# Patient Record
Sex: MALE | Race: BLACK OR AFRICAN AMERICAN | NOT HISPANIC OR LATINO | Employment: UNEMPLOYED | URBAN - METROPOLITAN AREA
[De-identification: names, ages, dates, MRNs, and addresses within clinical notes are randomized per-mention and may not be internally consistent; named-entity substitution may affect disease eponyms.]

---

## 2017-08-21 ENCOUNTER — TRANSFERRED RECORDS (OUTPATIENT)
Dept: HEALTH INFORMATION MANAGEMENT | Facility: CLINIC | Age: 62
End: 2017-08-21

## 2019-09-18 ENCOUNTER — TRANSFERRED RECORDS (OUTPATIENT)
Dept: HEALTH INFORMATION MANAGEMENT | Facility: CLINIC | Age: 64
End: 2019-09-18

## 2020-01-27 ENCOUNTER — TRANSFERRED RECORDS (OUTPATIENT)
Dept: HEALTH INFORMATION MANAGEMENT | Facility: CLINIC | Age: 65
End: 2020-01-27

## 2020-06-02 ENCOUNTER — TRANSFERRED RECORDS (OUTPATIENT)
Dept: HEALTH INFORMATION MANAGEMENT | Facility: CLINIC | Age: 65
End: 2020-06-02

## 2020-06-21 ENCOUNTER — TRANSFERRED RECORDS (OUTPATIENT)
Dept: HEALTH INFORMATION MANAGEMENT | Facility: CLINIC | Age: 65
End: 2020-06-21

## 2020-07-08 ENCOUNTER — TRANSFERRED RECORDS (OUTPATIENT)
Dept: HEALTH INFORMATION MANAGEMENT | Facility: CLINIC | Age: 65
End: 2020-07-08

## 2020-07-23 ENCOUNTER — TRANSFERRED RECORDS (OUTPATIENT)
Dept: HEALTH INFORMATION MANAGEMENT | Facility: CLINIC | Age: 65
End: 2020-07-23

## 2020-10-09 ENCOUNTER — TRANSFERRED RECORDS (OUTPATIENT)
Dept: HEALTH INFORMATION MANAGEMENT | Facility: CLINIC | Age: 65
End: 2020-10-09

## 2020-12-02 ENCOUNTER — TRANSFERRED RECORDS (OUTPATIENT)
Dept: HEALTH INFORMATION MANAGEMENT | Facility: CLINIC | Age: 65
End: 2020-12-02

## 2020-12-04 ENCOUNTER — MEDICAL CORRESPONDENCE (OUTPATIENT)
Dept: HEALTH INFORMATION MANAGEMENT | Facility: CLINIC | Age: 65
End: 2020-12-04

## 2020-12-04 ENCOUNTER — TRANSCRIBE ORDERS (OUTPATIENT)
Dept: OTHER | Age: 65
End: 2020-12-04

## 2020-12-04 DIAGNOSIS — I82.409 DVT (DEEP VENOUS THROMBOSIS) (H): ICD-10-CM

## 2020-12-04 DIAGNOSIS — I26.99 PULMONARY EMBOLISM (H): ICD-10-CM

## 2020-12-04 DIAGNOSIS — D64.9 ANEMIA: Primary | ICD-10-CM

## 2020-12-10 ENCOUNTER — TRANSFERRED RECORDS (OUTPATIENT)
Dept: HEALTH INFORMATION MANAGEMENT | Facility: CLINIC | Age: 65
End: 2020-12-10

## 2020-12-10 NOTE — TELEPHONE ENCOUNTER
Oncology/Surgical Oncology Referral Request:     Specialty Requested: Medical Oncology     Referring Provider: Dr Adam Sargent    Referring Clinic/Organization: [facility]     Records location: Faxed - Media tab/Scanned     Requested Provider (if specified): Not Specified

## 2020-12-15 ENCOUNTER — OFFICE VISIT (OUTPATIENT)
Dept: FAMILY MEDICINE | Facility: CLINIC | Age: 65
End: 2020-12-15
Payer: MEDICARE

## 2020-12-15 VITALS
SYSTOLIC BLOOD PRESSURE: 100 MMHG | DIASTOLIC BLOOD PRESSURE: 58 MMHG | HEART RATE: 90 BPM | OXYGEN SATURATION: 97 % | WEIGHT: 199 LBS | TEMPERATURE: 98.2 F

## 2020-12-15 DIAGNOSIS — M25.562 CHRONIC PAIN OF LEFT KNEE: ICD-10-CM

## 2020-12-15 DIAGNOSIS — M21.962 DEFORMITY OF LEFT KNEE JOINT: ICD-10-CM

## 2020-12-15 DIAGNOSIS — M75.01 ADHESIVE CAPSULITIS OF RIGHT SHOULDER: ICD-10-CM

## 2020-12-15 DIAGNOSIS — G89.29 CHRONIC PAIN OF LEFT KNEE: ICD-10-CM

## 2020-12-15 DIAGNOSIS — M19.111 OSTEOARTHRITIS OF RIGHT SHOULDER DUE TO ROTATOR CUFF INJURY: ICD-10-CM

## 2020-12-15 DIAGNOSIS — S46.001S OSTEOARTHRITIS OF RIGHT SHOULDER DUE TO ROTATOR CUFF INJURY: ICD-10-CM

## 2020-12-15 DIAGNOSIS — Z96.652 STATUS POST LEFT KNEE REPLACEMENT: Primary | ICD-10-CM

## 2020-12-15 DIAGNOSIS — M62.559 ATROPHY OF QUADRICEPS FEMORIS MUSCLE: ICD-10-CM

## 2020-12-15 PROCEDURE — 99203 OFFICE O/P NEW LOW 30 MIN: CPT | Performed by: PHYSICIAN ASSISTANT

## 2020-12-15 SDOH — HEALTH STABILITY: MENTAL HEALTH: HOW OFTEN DO YOU HAVE A DRINK CONTAINING ALCOHOL?: NEVER

## 2020-12-15 NOTE — PROGRESS NOTES
Subjective     Casey Martinez is a 65 year old male who presents to clinic today for the following health issues:    HPI         Musculoskeletal problem/pain  Onset/Duration: had knee replacement surgery 10 years ago:   Description  Location: knee and both shoulders - left  Joint Swelling: YES- left knee  Redness: YES  Pain: YES  Warmth: YES  Intensity:  severe  Progression of Symptoms:  worsening and intermittent  Accompanying signs and symptoms:   Fevers: no  Numbness/tingling/weakness: YES  History  Trauma to the area: Had surgery on it about 8-10 years ago  Recent illness:  no  Previous similar problem: has been having pain for years   Previous evaluation:  no  Precipitating or alleviating factors:  Aggravating factors include: standing and walking  Therapies tried and outcome: acetaminophen, Ibuprofen and gabapentin and does not seem to help.      Got a shot in the right shoulder a few months ago at [facility]  He went to 2 therapy appointments. Was told he needed to go to 5 before a shoulder replacement  He doesn't want a shoulder replacement  Not happy with the care and would like something different  Pain due to chronic rotator cuff tearing and OA with concurrent adhesive capsulitis. Poor surgical candidate because of underlying comorbidity    Also with left knee pain after knee replacement  It's been swollen for about 6 years now  His pain management clinic ([facility])referred him to go to an orthopedic in [place] but they don't take his insurance    Reports all he needs is a referral to a specialist    Problem list and histories reviewed & adjusted, as indicated.  Additional history: as documented    ROS:  CONSTITUTIONAL: NEGATIVE for fever, chills, change in weight  ENT/MOUTH: NEGATIVE for ear, mouth and throat problems  RESP: NEGATIVE for significant cough or SOB  CV: NEGATIVE for chest pain, palpitations or peripheral edema    There is no problem list on file for this patient.    History  reviewed. No pertinent surgical history.    Social History     Tobacco Use     Smoking status: Current Every Day Smoker     Smokeless tobacco: Never Used   Substance Use Topics     Alcohol use: Never     Frequency: Never     History reviewed. No pertinent family history.        Labs reviewed in EPIC  There is no problem list on file for this patient.    History reviewed. No pertinent surgical history.    Social History     Tobacco Use     Smoking status: Current Every Day Smoker     Smokeless tobacco: Never Used   Substance Use Topics     Alcohol use: Never     Frequency: Never     History reviewed. No pertinent family history.        OBJECTIVE:                                                    /58   Pulse 90   Temp 98.2  F (36.8  C) (Temporal)   Wt 90.3 kg (199 lb)   SpO2 97%  There is no height or weight on file to calculate BMI.   GENERAL:  alert, well nourished, well hydrated, no distress  EYES: Eyes grossly normal to inspection, extraocular movements - intact, and PERRL  SKIN: no suspicious lesions, no rashes  LEFT KNEE: large effusion (chronic per patient) with surgical scar anteriorly, quad atrophy, unable to perform knee extension due to muscle weakness. Tender diffusely.   Right shoulder: unable to perform ROM secondary to pain  PSYCH: Alert and oriented times 3; speech- coherent , normal rate and volume; able to articulate logical thoughts, able to abstract reason, no tangential thoughts, no hallucinations or delusions, affect- normal    No results found for this or any previous visit (from the past 24 hour(s)).       ASSESSMENT/PLAN:                                                        ICD-10-CM    1. Status post left knee replacement  Z96.652 Orthopedic & Spine  Referral   2. Deformity of left knee joint  M21.962 Orthopedic & Spine  Referral   3. Chronic pain of left knee  M25.562 Orthopedic & Spine  Referral    G89.29    4. Atrophy of quadriceps femoris muscle   M62.559 Orthopedic & Spine  Referral   5. Osteoarthritis of right shoulder due to rotator cuff injury  M19.111 Orthopedic & Spine  Referral    S46.001S    6. Adhesive capsulitis of right shoulder  M75.01 Orthopedic & Spine  Referral       Patient Instructions   Follow up with ortho  Continue with physical rehab  Return to clinic for any new or worsening symptoms or go to ER Urgent care in off hours          There is no height or weight on file to calculate BMI.       Sofia QUAN [facility]

## 2020-12-15 NOTE — PATIENT INSTRUCTIONS
Follow up with ortho  Continue with physical rehab  Return to clinic for any new or worsening symptoms or go to ER Urgent care in off hours

## 2020-12-16 NOTE — TELEPHONE ENCOUNTER
RECORDS RECEIVED FROM: Osteoarthritis of right shoulder due to rotator cuff injury /Adhesive capsulitis of right shoulder/ Sofia Ballesteros PA-C in  PRIMARY CARE/ no images/ [facility]/ ortho con   DATE RECEIVED: Jan 4, 2021     NOTES STATUS DETAILS   OFFICE NOTE from referring provider Care Everywhere    OFFICE NOTE from other specialist N/A    DISCHARGE SUMMARY from hospital N/A    DISCHARGE REPORT from the ER N/A    OPERATIVE REPORT N/A    MEDICATION LIST Internal    IMPLANT RECORD/STICKER N/A    LABS     CBC/DIFF N/A    CULTURES N/A    INJECTIONS DONE IN RADIOLOGY N/A    MRI In process    CT SCAN N/A    XRAYS (IMAGES & REPORTS) In process    TUMOR     PATHOLOGY  Slides & report N/A    12/30/20   9:11 AM   Images resolved in PACS  Complete  Danielle Tim CMA    12/29/20   2:47 PM   2nd request sent to [facility]  Called Anna again  Danielle Tim CMA    12/15/20   6:38 PM   Requests sent to  and Anna for images  Danielle Tim CMA

## 2020-12-30 ENCOUNTER — TRANSFERRED RECORDS (OUTPATIENT)
Dept: HEALTH INFORMATION MANAGEMENT | Facility: CLINIC | Age: 65
End: 2020-12-30

## 2020-12-30 DIAGNOSIS — M25.511 RIGHT SHOULDER PAIN, UNSPECIFIED CHRONICITY: Primary | ICD-10-CM

## 2021-01-04 ENCOUNTER — PRE VISIT (OUTPATIENT)
Dept: ORTHOPEDICS | Facility: CLINIC | Age: 66
End: 2021-01-04

## 2021-01-13 DIAGNOSIS — M25.562 LEFT KNEE PAIN: Primary | ICD-10-CM

## 2021-01-14 ENCOUNTER — PRE VISIT (OUTPATIENT)
Dept: ORTHOPEDICS | Facility: CLINIC | Age: 66
End: 2021-01-14

## 2021-01-14 ENCOUNTER — ANCILLARY PROCEDURE (OUTPATIENT)
Dept: GENERAL RADIOLOGY | Facility: CLINIC | Age: 66
End: 2021-01-14
Attending: ORTHOPAEDIC SURGERY
Payer: MEDICARE

## 2021-01-14 DIAGNOSIS — M25.562 LEFT KNEE PAIN: ICD-10-CM

## 2021-01-14 DIAGNOSIS — M25.511 RIGHT SHOULDER PAIN, UNSPECIFIED CHRONICITY: ICD-10-CM

## 2021-01-14 PROCEDURE — 73562 X-RAY EXAM OF KNEE 3: CPT | Mod: LT | Performed by: RADIOLOGY

## 2021-01-14 PROCEDURE — 73030 X-RAY EXAM OF SHOULDER: CPT | Mod: GC | Performed by: RADIOLOGY

## 2021-01-21 ENCOUNTER — VIRTUAL VISIT (OUTPATIENT)
Dept: ORTHOPEDICS | Facility: CLINIC | Age: 66
End: 2021-01-21
Payer: COMMERCIAL

## 2021-01-21 DIAGNOSIS — Z96.659 INFECTION OF TOTAL KNEE REPLACEMENT, INITIAL ENCOUNTER (H): Primary | ICD-10-CM

## 2021-01-21 DIAGNOSIS — T84.59XA INFECTION OF TOTAL KNEE REPLACEMENT, INITIAL ENCOUNTER (H): Primary | ICD-10-CM

## 2021-01-21 PROCEDURE — 99443 PR PHYSICIAN TELEPHONE EVALUATION 21-30 MIN: CPT | Mod: 95 | Performed by: ORTHOPAEDIC SURGERY

## 2021-01-21 ASSESSMENT — KOOS JR: HOW SEVERE IS YOUR KNEE STIFFNESS AFTER FIRST WAKING IN MORNING: SEVERE

## 2021-01-21 ASSESSMENT — ACTIVITIES OF DAILY LIVING (ADL): FUNCTION,_DAILY_LIVING_SCORE: 4.41

## 2021-01-21 NOTE — PROGRESS NOTES
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     Plan:  He is going to come in to clinic for infection work up-- aspiration, blood work.     Chief Complaint: Consult (has pain in the middle of his knee when he is bending it when he is walking. has tried PT and injection and had no relief or improvment from that )          MEDICAL HISTORY: History reviewed. No pertinent past medical history.  DASH (acute kidney injury) 10/28/2020   H/O Billroth II operation 10/17/2020   Cough 10/17/2020   Acute pulmonary embolism without acute cor pulmonale 10/17/2020   SOB (shortness of breath) 10/17/2020   Microcytic anemia 10/17/2020   Cocaine substance abuse 06/23/2020   Acute blood loss anemia 06/23/2020   Hemorrhagic shock 06/23/2020   Acute hypotension 06/23/2020   Peptic ulcer disease 06/19/2020   Overview:     Distant past. S/p Billroth II     Arterial hypotension 06/18/2020   Gastrointestinal hemorrhage 06/18/2020   History of urinary incontinence 07/30/2017   Urinary retention 07/30/2017     History of lower extremity ulcers.   Medications:   Medications  Reconcile with Patient's Chart  Medication Sig Dispensed Refills Start Date End Date Status   oxyCODONE (ROXICODONE) 5 mg immediate release tablet    Indications: Pain syndrome, chronic Take 1 tablet by mouth every 4 hours if needed for Pain 15 tablet   0 10/22/2020   Active   pantoprazole (PROTONIX) 40 mg delayed-release tablet    Indications: Gastrointestinal hemorrhage, unspecified gastrointestinal hemorrhage type Take 1 tablet by mouth once daily before a meal. 30 tablet   0 10/22/2020   Active   gabapentin (NEURONTIN) 300 mg capsule   Take 900 mg by mouth 3 times daily.   0     Active   tamsulosin (FLOMAX) 0.4 mg capsule   Take 0.4 mg by mouth once daily after a meal.   0     Active   acetaminophen (TYLENOL EXTRA  STRENGTH) 500 mg tablet   Take 1,500 mg by mouth once daily if needed. Max acetaminophen dose: 4000mg in 24 hrs.   0     Active   camphor-menthol, 0.5%-0.5%, (SARNA ANTI-ITCH) lotion    Indications: Pruritus Apply lotion topically to affected area(s) 3 times daily if needed. 222 mL   0 11/06/2020   Active   multivitamin (MVI) tablet    Indications: Gastrointestinal hemorrhage, unspecified gastrointestinal hemorrhage type Take 1 tablet by mouth once daily. 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 He now demonstrates reactivity against Rh(C), Rh (E), Jkb antigens, as well as Sla and a warm autoantibody.  Anti-C, E and Jkb are all considered clinically significant and he will require red cell containing products to be negative for the above three antigens. This requires ordering cells confirmed negative for Jkb from [facility] which will require at least 4 hours advance warning. Sla is not considered to be clinically significant, Jed B Gorlin, MD, 9/2/2011 8:32 AM            Current Outpatient Medications:      lidocaine (XYLOCAINE) 5 % ointment, Apply  topically daily. Apply a pea size amount to ulcer when changing the dressing on the wound., Disp: 35.44 g, Rfl: 0     naproxen sodium (ALEVE) 220 MG capsule, Take 220 mg by mouth 2 times daily (with meals)., Disp: , Rfl:     Allergies: Morphine sulfate    SURGICAL HISTORY: History reviewed. No pertinent surgical history.    FAMILY HISTORY: History reviewed. No pertinent family history.    SOCIAL HISTORY:   Social History     Tobacco Use     Smoking status: Current Every Day Smoker     Smokeless tobacco: Never Used   Substance Use Topics     Alcohol use: Never     Frequency: Never       REVIEW OF SYSTEMS:  The comprehensive review of systems from the intake form was reviewed with the patient.  No fever,  weight change or fatigue. No dry eyes. No oral ulcers, sore throat or voice change. No palpitations, syncope, angina or edema.  No chest pain, excessive sleepiness, shortness of breath or hemoptysis.   No abdominal pain, nausea, vomiting, diarrhea or heartburn.  No skin rash. No focal weakness or numbness. No bleeding or lymphadenopathy. No rhinitis or hives.     Exam:  On physical examination the patient appears the stated age, is in no acute distress, affectThe is appropriate, and breathing is non-labored.  Vitals are documented in the EMR and have been reviewed:    There were no vitals taken for this visit.  Data Unavailable  There is no height or weight on file to calculate BMI.      X-rays:   Final Report   3 views left knee radiographs 1/14/2021 9:37 AM    History: Left knee pain    Additional History from EMR: 65-year-old male with history of left  knee pain status post left total knee replacement 8-10 years ago    Comparison: Outside radiographs 7/1/2020, 2/19/2019, 12/14/2017    Findings:    AP, lateral and patellofemoral views of the left knee were obtained.     Postsurgical changes of a total knee replacement with long stem  femoral and tibial components. Abnormal periprosthetic lucency about  the femoral stem, especially proximally. Difficult to compared to  prior outside radiographs 2/19/2019 and 7/1/2020 as this area was  incompletely imaged. Worsening osteolysis of the medial femoral  condyle. The alignment appears similar to prior. Irregular medial  femoral condyle with adjacent bony fragment, similar to prior.  Significant degeneration and lateral subluxation of the patella,  similar to prior. Patella baja. Moderate soft tissue density anterior  and posterior to the femoral component.    Impression:  Abnormal lucency about the femoral stem, especially proximally.  Comparison with prior outside studies is difficult given incomplete  visualization of the proximal femoral stem. Lucency  surrounding  increased compared to 7/20/2017. Osteolysis of the medial femoral  condyle is also progressed compared to prior outside studies. This  remains concerning for loosening.    I have personally reviewed the examination and initial interpretation  and I agree with the findings.    ROSEMARIE KENT MD (Joe)    Telephone visit    I spent 25 minutes reviewing the patient's chart and imaging and reviewing these findings with the patient and discussing the plan.

## 2021-01-21 NOTE — NURSING NOTE
Reason For Visit:   Chief Complaint   Patient presents with     Consult     has pain in the middle of his knee when he is bending it when he is walking. has tried PT and injection and had no relief or improvment from that        There were no vitals taken for this visit.         Moise Lucio, ATC

## 2021-01-21 NOTE — LETTER
1/21/2021         RE: Casey Martinez  [address]  [facility] [address]        Dear Colleague,    Thank you for referring your patient, Casey Martinez, to the [facility] [place]. Please see a copy of my visit note below.    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     Plan:  He is going to come in to clinic for infection work up-- aspiration, blood work.     Chief Complaint: Consult (has pain in the middle of his knee when he is bending it when he is walking. has tried PT and injection and had no relief or improvment from that )          MEDICAL HISTORY: History reviewed. No pertinent past medical history.  DASH (acute kidney injury) 10/28/2020   H/O Billroth II operation 10/17/2020   Cough 10/17/2020   Acute pulmonary embolism without acute cor pulmonale 10/17/2020   SOB (shortness of breath) 10/17/2020   Microcytic anemia 10/17/2020   Cocaine substance abuse 06/23/2020   Acute blood loss anemia 06/23/2020   Hemorrhagic shock 06/23/2020   Acute hypotension 06/23/2020   Peptic ulcer disease 06/19/2020   Overview:     Distant past. S/p Billroth II     Arterial hypotension 06/18/2020   Gastrointestinal hemorrhage 06/18/2020   History of urinary incontinence 07/30/2017   Urinary retention 07/30/2017     History of lower extremity ulcers.   Medications:   Medications  Reconcile with Patient's Chart  Medication Sig Dispensed Refills Start Date End Date Status   oxyCODONE (ROXICODONE) 5 mg immediate release tablet    Indications: Pain syndrome, chronic Take 1 tablet by mouth every 4 hours if needed for Pain 15 tablet   0 10/22/2020   Active   pantoprazole (PROTONIX) 40 mg delayed-release tablet    Indications: Gastrointestinal hemorrhage, unspecified gastrointestinal hemorrhage type Take 1 tablet by mouth once daily before a  meal. 30 tablet   0 10/22/2020   Active   gabapentin (NEURONTIN) 300 mg capsule   Take 900 mg by mouth 3 times daily.   0     Active   tamsulosin (FLOMAX) 0.4 mg capsule   Take 0.4 mg by mouth once daily after a meal.   0     Active   acetaminophen (TYLENOL EXTRA STRENGTH) 500 mg tablet   Take 1,500 mg by mouth once daily if needed. Max acetaminophen dose: 4000mg in 24 hrs.   0     Active   camphor-menthol, 0.5%-0.5%, (SARNA ANTI-ITCH) lotion    Indications: Pruritus Apply lotion topically to affected area(s) 3 times daily if needed. 222 mL   0 11/06/2020   Active   multivitamin (MVI) tablet    Indications: Gastrointestinal hemorrhage, unspecified gastrointestinal hemorrhage type Take 1 tablet by mouth once daily. 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 He now demonstrates reactivity against Rh(C), Rh (E), Jkb antigens, as well as Sla and a warm autoantibody.  Anti-C, E and Jkb are all considered clinically significant and he will require red cell containing products to be negative for the above three antigens. This requires ordering cells confirmed negative for Jkb from [facility] which will require at least 4 hours advance warning. Sla is not considered to be clinically significant, Jed B Gorlin, MD, 9/2/2011 8:32 AM            Current Outpatient Medications:      lidocaine (XYLOCAINE) 5 % ointment, Apply  topically daily. Apply a pea size amount to ulcer when changing the dressing on the wound., Disp: 35.44 g, Rfl: 0     naproxen sodium (ALEVE) 220 MG capsule, Take 220 mg by mouth 2 times daily (with meals)., Disp: , Rfl:     Allergies: Morphine sulfate    SURGICAL HISTORY: History reviewed. No pertinent surgical history.    FAMILY HISTORY: History reviewed. No pertinent family history.    SOCIAL HISTORY:   Social History     Tobacco Use      Smoking status: Current Every Day Smoker     Smokeless tobacco: Never Used   Substance Use Topics     Alcohol use: Never     Frequency: Never       REVIEW OF SYSTEMS:  The comprehensive review of systems from the intake form was reviewed with the patient.  No fever, weight change or fatigue. No dry eyes. No oral ulcers, sore throat or voice change. No palpitations, syncope, angina or edema.  No chest pain, excessive sleepiness, shortness of breath or hemoptysis.   No abdominal pain, nausea, vomiting, diarrhea or heartburn.  No skin rash. No focal weakness or numbness. No bleeding or lymphadenopathy. No rhinitis or hives.     Exam:  On physical examination the patient appears the stated age, is in no acute distress, affectThe is appropriate, and breathing is non-labored.  Vitals are documented in the EMR and have been reviewed:    There were no vitals taken for this visit.  Data Unavailable  There is no height or weight on file to calculate BMI.      X-rays:   Final Report   3 views left knee radiographs 1/14/2021 9:37 AM    History: Left knee pain    Additional History from EMR: 65-year-old male with history of left  knee pain status post left total knee replacement 8-10 years ago    Comparison: Outside radiographs 7/1/2020, 2/19/2019, 12/14/2017    Findings:    AP, lateral and patellofemoral views of the left knee were obtained.     Postsurgical changes of a total knee replacement with long stem  femoral and tibial components. Abnormal periprosthetic lucency about  the femoral stem, especially proximally. Difficult to compared to  prior outside radiographs 2/19/2019 and 7/1/2020 as this area was  incompletely imaged. Worsening osteolysis of the medial femoral  condyle. The alignment appears similar to prior. Irregular medial  femoral condyle with adjacent bony fragment, similar to prior.  Significant degeneration and lateral subluxation of the patella,  similar to prior. Patella baja. Moderate soft tissue density  anterior  and posterior to the femoral component.    Impression:  Abnormal lucency about the femoral stem, especially proximally.  Comparison with prior outside studies is difficult given incomplete  visualization of the proximal femoral stem. Lucency surrounding  increased compared to 7/20/2017. Osteolysis of the medial femoral  condyle is also progressed compared to prior outside studies. This  remains concerning for loosening.    I have personally reviewed the examination and initial interpretation  and I agree with the findings.    ROSEMARIE KENT MD (Joe)    Telephone visit    I spent 25 minutes reviewing the patient's chart and imaging and reviewing these findings with the patient and discussing the plan.

## 2021-01-28 ENCOUNTER — PRE VISIT (OUTPATIENT)
Dept: ONCOLOGY | Facility: CLINIC | Age: 66
End: 2021-01-28

## 2021-02-03 NOTE — TELEPHONE ENCOUNTER
Action    Action Taken 2/3/21:    NIKKI @ [facility] Rec.    Msg to Hallie  12:54 PM       RECORDS STATUS - ALL OTHER DIAGNOSIS      RECORDS RECEIVED FROM: [facility]    DATE RECEIVED:    NOTES STATUS DETAILS   OFFICE NOTE from referring provider NIKKI @ [facility] 12/20/20: [facility]   OFFICE NOTE from medical oncologist     DISCHARGE SUMMARY from hospital     DISCHARGE REPORT from the ER     OPERATIVE REPORT     MEDICATION LIST     CLINICAL TRIAL TREATMENTS TO DATE     LABS     PATHOLOGY REPORTS     ANYTHING RELATED TO DIAGNOSIS Epic/CE 11/6/20   GENONOMIC TESTING     TYPE:     IMAGING (NEED IMAGES & REPORT)     CT SCANS     MRI     MAMMO     ULTRASOUND     PET

## 2021-02-04 ENCOUNTER — OFFICE VISIT (OUTPATIENT)
Dept: ORTHOPEDICS | Facility: CLINIC | Age: 66
End: 2021-02-04
Payer: COMMERCIAL

## 2021-02-04 ENCOUNTER — ONCOLOGY VISIT (OUTPATIENT)
Dept: ONCOLOGY | Facility: CLINIC | Age: 66
End: 2021-02-04
Attending: INTERNAL MEDICINE
Payer: COMMERCIAL

## 2021-02-04 VITALS
BODY MASS INDEX: 23.77 KG/M2 | SYSTOLIC BLOOD PRESSURE: 127 MMHG | HEART RATE: 63 BPM | DIASTOLIC BLOOD PRESSURE: 85 MMHG | OXYGEN SATURATION: 99 % | WEIGHT: 205.4 LBS | HEIGHT: 78 IN | TEMPERATURE: 98 F | RESPIRATION RATE: 18 BRPM

## 2021-02-04 VITALS — BODY MASS INDEX: 23.77 KG/M2 | WEIGHT: 205.4 LBS | HEIGHT: 78 IN

## 2021-02-04 DIAGNOSIS — D64.9 ANEMIA: ICD-10-CM

## 2021-02-04 DIAGNOSIS — Z96.659 INFECTION OF TOTAL KNEE REPLACEMENT, INITIAL ENCOUNTER (H): ICD-10-CM

## 2021-02-04 DIAGNOSIS — I82.409 DVT (DEEP VENOUS THROMBOSIS) (H): ICD-10-CM

## 2021-02-04 DIAGNOSIS — D50.0 IRON DEFICIENCY ANEMIA DUE TO CHRONIC BLOOD LOSS: ICD-10-CM

## 2021-02-04 DIAGNOSIS — Z96.659 INFECTION OF TOTAL KNEE REPLACEMENT, INITIAL ENCOUNTER (H): Primary | ICD-10-CM

## 2021-02-04 DIAGNOSIS — Z86.718 HISTORY OF DEEP VENOUS THROMBOSIS: ICD-10-CM

## 2021-02-04 DIAGNOSIS — D50.0 IRON DEFICIENCY ANEMIA DUE TO CHRONIC BLOOD LOSS: Primary | ICD-10-CM

## 2021-02-04 DIAGNOSIS — B19.20 HEPATITIS C VIRUS INFECTION WITHOUT HEPATIC COMA, UNSPECIFIED CHRONICITY: ICD-10-CM

## 2021-02-04 DIAGNOSIS — T84.59XA INFECTION OF TOTAL KNEE REPLACEMENT, INITIAL ENCOUNTER (H): ICD-10-CM

## 2021-02-04 DIAGNOSIS — I26.99 PULMONARY EMBOLISM (H): ICD-10-CM

## 2021-02-04 DIAGNOSIS — T84.59XA INFECTION OF TOTAL KNEE REPLACEMENT, INITIAL ENCOUNTER (H): Primary | ICD-10-CM

## 2021-02-04 LAB
ALBUMIN SERPL-MCNC: 3.7 G/DL (ref 3.4–5)
ALP SERPL-CCNC: 101 U/L (ref 40–150)
ALT SERPL W P-5'-P-CCNC: 19 U/L (ref 0–70)
ANION GAP SERPL CALCULATED.3IONS-SCNC: 5 MMOL/L (ref 3–14)
APPEARANCE FLD: NORMAL
AST SERPL W P-5'-P-CCNC: 22 U/L (ref 0–45)
BASOPHILS # BLD AUTO: 0 10E9/L (ref 0–0.2)
BASOPHILS NFR BLD AUTO: 0.9 %
BILIRUB SERPL-MCNC: 0.3 MG/DL (ref 0.2–1.3)
BUN SERPL-MCNC: 16 MG/DL (ref 7–30)
CALCIUM SERPL-MCNC: 8.8 MG/DL (ref 8.5–10.1)
CHLORIDE SERPL-SCNC: 112 MMOL/L (ref 94–109)
CO2 SERPL-SCNC: 26 MMOL/L (ref 20–32)
COLOR FLD: NORMAL
CREAT SERPL-MCNC: 0.98 MG/DL (ref 0.66–1.25)
CRP SERPL-MCNC: <2.9 MG/L (ref 0–8)
D DIMER PPP FEU-MCNC: 3.1 UG/ML FEU (ref 0–0.5)
DIFFERENTIAL METHOD BLD: ABNORMAL
EOSINOPHIL # BLD AUTO: 0.5 10E9/L (ref 0–0.7)
EOSINOPHIL NFR BLD AUTO: 10 %
EOSINOPHIL NFR FLD MANUAL: 5 %
ERYTHROCYTE [DISTWIDTH] IN BLOOD BY AUTOMATED COUNT: 21.2 % (ref 10–15)
ERYTHROCYTE [SEDIMENTATION RATE] IN BLOOD BY WESTERGREN METHOD: 10 MM/H (ref 0–20)
FERRITIN SERPL-MCNC: 125 NG/ML (ref 26–388)
GFR SERPL CREATININE-BSD FRML MDRD: 80 ML/MIN/{1.73_M2}
GLUCOSE SERPL-MCNC: 72 MG/DL (ref 70–99)
GRAM STN SPEC: NORMAL
GRAM STN SPEC: NORMAL
HCT VFR BLD AUTO: 37.8 % (ref 40–53)
HGB BLD-MCNC: 12.4 G/DL (ref 13.3–17.7)
IMM GRANULOCYTES # BLD: 0 10E9/L (ref 0–0.4)
IMM GRANULOCYTES NFR BLD: 0.2 %
IRON SATN MFR SERPL: 85 % (ref 15–46)
IRON SERPL-MCNC: 252 UG/DL (ref 35–180)
LYMPHOCYTES # BLD AUTO: 1.1 10E9/L (ref 0.8–5.3)
LYMPHOCYTES NFR BLD AUTO: 24.1 %
LYMPHOCYTES NFR FLD MANUAL: 26 %
Lab: NORMAL
MCH RBC QN AUTO: 26.1 PG (ref 26.5–33)
MCHC RBC AUTO-ENTMCNC: 32.8 G/DL (ref 31.5–36.5)
MCV RBC AUTO: 80 FL (ref 78–100)
MONOCYTES # BLD AUTO: 0.3 10E9/L (ref 0–1.3)
MONOCYTES NFR BLD AUTO: 6.3 %
MONOS+MACROS NFR FLD MANUAL: 34 %
NEUTROPHILS # BLD AUTO: 2.7 10E9/L (ref 1.6–8.3)
NEUTROPHILS NFR BLD AUTO: 58.5 %
NEUTS BAND NFR FLD MANUAL: 35 %
NRBC # BLD AUTO: 0 10*3/UL
NRBC BLD AUTO-RTO: 0 /100
PLATELET # BLD AUTO: 176 10E9/L (ref 150–450)
POTASSIUM SERPL-SCNC: 3.8 MMOL/L (ref 3.4–5.3)
PROT SERPL-MCNC: 7.3 G/DL (ref 6.8–8.8)
RBC # BLD AUTO: 4.75 10E12/L (ref 4.4–5.9)
RETICS # AUTO: 59.4 10E9/L (ref 25–95)
RETICS/RBC NFR AUTO: 1.3 % (ref 0.5–2)
SODIUM SERPL-SCNC: 143 MMOL/L (ref 133–144)
SPECIMEN SOURCE FLD: NORMAL
SPECIMEN SOURCE: NORMAL
TIBC SERPL-MCNC: 296 UG/DL (ref 240–430)
WBC # BLD AUTO: 4.6 10E9/L (ref 4–11)
WBC # FLD AUTO: 501 /UL

## 2021-02-04 PROCEDURE — 85025 COMPLETE CBC W/AUTO DIFF WBC: CPT | Performed by: INTERNAL MEDICINE

## 2021-02-04 PROCEDURE — 80053 COMPREHEN METABOLIC PANEL: CPT | Performed by: INTERNAL MEDICINE

## 2021-02-04 PROCEDURE — 20610 DRAIN/INJ JOINT/BURSA W/O US: CPT | Mod: LT | Performed by: ORTHOPAEDIC SURGERY

## 2021-02-04 PROCEDURE — 83921 ORGANIC ACID SINGLE QUANT: CPT | Performed by: INTERNAL MEDICINE

## 2021-02-04 PROCEDURE — 36415 COLL VENOUS BLD VENIPUNCTURE: CPT

## 2021-02-04 PROCEDURE — 89051 BODY FLUID CELL COUNT: CPT | Performed by: ORTHOPAEDIC SURGERY

## 2021-02-04 PROCEDURE — 99207 PR NO CHARGE INJECTABLE MED/DRUG: CPT | Performed by: ORTHOPAEDIC SURGERY

## 2021-02-04 PROCEDURE — 85379 FIBRIN DEGRADATION QUANT: CPT | Performed by: INTERNAL MEDICINE

## 2021-02-04 PROCEDURE — G0463 HOSPITAL OUTPT CLINIC VISIT: HCPCS

## 2021-02-04 PROCEDURE — 87205 SMEAR GRAM STAIN: CPT | Performed by: ORTHOPAEDIC SURGERY

## 2021-02-04 PROCEDURE — 99205 OFFICE O/P NEW HI 60 MIN: CPT | Performed by: INTERNAL MEDICINE

## 2021-02-04 PROCEDURE — 87075 CULTR BACTERIA EXCEPT BLOOD: CPT | Performed by: ORTHOPAEDIC SURGERY

## 2021-02-04 PROCEDURE — 87522 HEPATITIS C REVRS TRNSCRPJ: CPT | Performed by: INTERNAL MEDICINE

## 2021-02-04 PROCEDURE — 85652 RBC SED RATE AUTOMATED: CPT | Performed by: ORTHOPAEDIC SURGERY

## 2021-02-04 PROCEDURE — 85045 AUTOMATED RETICULOCYTE COUNT: CPT | Performed by: INTERNAL MEDICINE

## 2021-02-04 PROCEDURE — 82728 ASSAY OF FERRITIN: CPT | Performed by: INTERNAL MEDICINE

## 2021-02-04 PROCEDURE — 99207 PR DROP WITH A PROCEDURE: CPT | Performed by: ORTHOPAEDIC SURGERY

## 2021-02-04 PROCEDURE — 83550 IRON BINDING TEST: CPT | Performed by: INTERNAL MEDICINE

## 2021-02-04 PROCEDURE — 86140 C-REACTIVE PROTEIN: CPT | Performed by: ORTHOPAEDIC SURGERY

## 2021-02-04 PROCEDURE — 87070 CULTURE OTHR SPECIMN AEROBIC: CPT | Performed by: ORTHOPAEDIC SURGERY

## 2021-02-04 PROCEDURE — 83540 ASSAY OF IRON: CPT | Performed by: INTERNAL MEDICINE

## 2021-02-04 RX ORDER — MULTIVITAMIN
1 TABLET ORAL
COMMUNITY
Start: 2020-11-06 | End: 2022-11-25

## 2021-02-04 RX ORDER — IBUPROFEN 600 MG/1
600 TABLET, FILM COATED ORAL PRN
Status: ON HOLD | COMMUNITY
End: 2021-07-30

## 2021-02-04 RX ORDER — CETIRIZINE HYDROCHLORIDE 10 MG/1
10 TABLET ORAL DAILY
COMMUNITY
Start: 2020-11-30

## 2021-02-04 RX ORDER — GABAPENTIN 300 MG/1
900 CAPSULE ORAL 3 TIMES DAILY
COMMUNITY
Start: 2020-08-12

## 2021-02-04 RX ORDER — HYDROXYZINE PAMOATE 25 MG/1
25-50 CAPSULE ORAL PRN
Status: ON HOLD | COMMUNITY
Start: 2020-05-15 | End: 2021-07-30

## 2021-02-04 RX ORDER — DOXEPIN HYDROCHLORIDE 10 MG/1
10 CAPSULE ORAL DAILY
COMMUNITY
End: 2021-07-28

## 2021-02-04 RX ORDER — TRIAMCINOLONE ACETONIDE 0.25 MG/G
CREAM TOPICAL 3 TIMES DAILY
COMMUNITY
Start: 2020-12-23

## 2021-02-04 RX ORDER — FERROUS SULFATE 324(65)MG
2 TABLET, DELAYED RELEASE (ENTERIC COATED) ORAL 2 TIMES DAILY
COMMUNITY
Start: 2020-12-02

## 2021-02-04 RX ORDER — BENZOCAINE/MENTHOL 6 MG-10 MG
LOZENGE MUCOUS MEMBRANE DAILY
Status: ON HOLD | COMMUNITY
Start: 2020-10-27 | End: 2021-07-30

## 2021-02-04 RX ORDER — KETOCONAZOLE 20 MG/G
CREAM TOPICAL DAILY
Status: ON HOLD | COMMUNITY
Start: 2020-03-05 | End: 2021-07-30

## 2021-02-04 RX ORDER — LISINOPRIL AND HYDROCHLOROTHIAZIDE 20; 25 MG/1; MG/1
1 TABLET ORAL DAILY
Status: ON HOLD | COMMUNITY
Start: 2020-06-26 | End: 2021-07-30

## 2021-02-04 RX ORDER — DIPHENHYDRAMINE HCL 25 MG
25 CAPSULE ORAL EVERY 6 HOURS PRN
COMMUNITY
Start: 2020-10-06

## 2021-02-04 RX ORDER — OXYCODONE HYDROCHLORIDE 10 MG/1
TABLET ORAL DAILY
COMMUNITY
Start: 2021-01-11 | End: 2021-07-28

## 2021-02-04 ASSESSMENT — MIFFLIN-ST. JEOR
SCORE: 1849.94
SCORE: 1849.94

## 2021-02-04 ASSESSMENT — PAIN SCALES - GENERAL: PAINLEVEL: WORST PAIN (10)

## 2021-02-04 NOTE — LETTER
2/4/2021         RE: Casey Martinez  [address]  [facility] [address]        Dear Colleague,    Thank you for referring your patient, Casey Martinez, to the [facility]. Please see a copy of my visit note below.    Hematology Clinic consult note    Reasons for Consult: - anemia, Deep vein thrombosis R leg, pulmonary embolism (DVT/PE)    History of Present Illness: Mr. Martinez is a 65 year old man with a h/o recurrent DVT/PE and also recurrent GI bleeding. He was admitted [facility] 10/17-10/22 with dyspnea- Hgb 6.8. CT angio of chest showed small ROSE PE and RUL ground glass infiltrate.  RLE doppler showed DVT, age indeterminant. He was not taking rivaroxaban at the time of admission. COVID negative x 2. He has a h/o Billroth II surgery. He underwent EGD and had lesion clipped at GJ anastomosis. Colonospcopy was negative. He was transfused 1 unit and given Venofer. Also treated with heparin and discharged on rivaroxaban 15 mg bid     He was readmitted 10/28-11/6 for recurrent bleeding. Rivaroxaban was held. He was transfused several units of PRBCs and Pillcam showed bleeding at Billroth anastomosis. Repeat EGD showed friable mucosa, bleeding from other site- source not obvious. Mesenteric angiography - no active bleeding. Placed on PPi and told to avoid NSAIDS. Repeat chest CT angio on 10/28/20 showed no PE, but poor study. He was not discharged on anticoagulation.    Going back in his history, He was admitted at [facility] and then at [facility] in June 2020 for hemorrhagic shock. He was on rivaroxaban at the time. This was held for several days, then restarted at 20 mg daily.     He was admitted at [facility] December 2019. He was diagnosed with RLE DVT 12/27/19. F/u ultrasounds have shown persistent thrombus- never resolved. Negative chest CT angio for PE.  At that time, he also had a GI bleeding, so IVC filter was placed 12/28/19. He was discharged on rivaroxaban. He also  "has venous insufficiency on the left leg, but  Numerous L leg US have been negative for DVT, since 2011.  Over the past 10 years he has also had several episodes of Gi bleeding, related to the BII.     He has had no overt GI hemorrhage since November 2020.  He has not taken any anticoagulation for the past couple months.  Today he says that he feels a little bit weak the past few days, has some dyspnea on exertion, occasional lightheadedness when he first stands up.  He is taking iron tablets 3 times a day and says his stools are black.  No nausea.  He complains of some early satiety and indigestion.  He is not having any pain or swelling in the right leg.  He has had chronic swelling in the left ankle and foot for many years and that skin has become a bit dark.  He has chronic significant swelling and pain in his left knee, ever since he had the knee replacement.  There is concern that the hardware is loose, and he has an appointment later today in orthopedics regarding the knee.    Past Medical History:  -Recurrent GI hemorrhage as above.   -RLE DVT Dec 2019, questionable PE October 2020  -s/p multiple revision total knee replacement, h/o brandee-prosthetic MRSA infection  -distant h/o drug abuse  -h/o hepatitis C-apparently not treated, he said it was no longer detectable  -h/o pancreatitis  - allo-antibodies- Anti-C, E and Jkb , difficult cross match    Medications:  -Ferrous sulfate 2 tablets twice daily  Flexeril  Gabapentin  Oxycodone cetirizine  Vitamin D  Hydrocortisone cream  Omeprazole  Tums    Family History: Brother with colon cancer, prostate cancer    Social: current smoker, 1/4 ppd, ETOH- no    Review of systems:  As in HPI.  Right shoulder pain, he is going to [facility] for an injection tomorrow. The rest of the > 10 point review of systems was negative.      PHYSICAL EXAMINATION:  /85   Pulse 63   Temp 98  F (36.7  C) (Tympanic)   Resp 18   Ht 1.981 m (6' 6\")   Wt 93.2 kg (205 lb 6.4 oz)   SpO2 " 99%   BMI 23.74 kg/m      General appearance:  Patient is 65 year old man in no acute distress.     HEENT:  No pallor, icterus.  Lungs:  Clear to auscultation bilaterally.   Heart:  Regular rate and rhythm; no S3 S4 or murmer.     Abdomen:  Positive bowel sounds, soft and nontender, nondistended.  No hepatomegaly. No splenomegaly appreciated.    Extremities:  No leg, ankle, foot edema on the right.  No stasis changes on the right ankle or foot left knee has significant effusion with well-healed surgical scar, no erythema.  Trace edema right ankle, with some chronic stasis changes on the foot.  Good capillary reflux, the foot is warm.  Skin:  No rash, no petechiae or ecchymoses.    Labs:  Results for YOANDY GALVAN (MRN [id number]) as of 2/4/2021 13:04   Ref. Range 2/4/2021 12:18   Sodium Latest Ref Range: 133 - 144 mmol/L 143   Potassium Latest Ref Range: 3.4 - 5.3 mmol/L 3.8   Chloride Latest Ref Range: 94 - 109 mmol/L 112 (H)   Carbon Dioxide Latest Ref Range: 20 - 32 mmol/L 26   Urea Nitrogen Latest Ref Range: 7 - 30 mg/dL 16   Creatinine Latest Ref Range: 0.66 - 1.25 mg/dL 0.98   GFR Estimate Latest Ref Range: >60 mL/min/1.73_m2 80   GFR Estimate If Black Latest Ref Range: >60 mL/min/1.73_m2 >90   Calcium Latest Ref Range: 8.5 - 10.1 mg/dL 8.8   Anion Gap Latest Ref Range: 3 - 14 mmol/L 5   Albumin Latest Ref Range: 3.4 - 5.0 g/dL 3.7   Protein Total Latest Ref Range: 6.8 - 8.8 g/dL 7.3   Bilirubin Total Latest Ref Range: 0.2 - 1.3 mg/dL 0.3   Alkaline Phosphatase Latest Ref Range: 40 - 150 U/L 101   ALT Latest Ref Range: 0 - 70 U/L 19   AST Latest Ref Range: 0 - 45 U/L 22   Ferritin Latest Ref Range: 26 - 388 ng/mL 125   Iron Latest Ref Range: 35 - 180 ug/dL 252 (H)   Iron Binding Cap Latest Ref Range: 240 - 430 ug/dL 296   Iron Saturation Index Latest Ref Range: 15 - 46 % 85 (H)   Glucose Latest Ref Range: 70 - 99 mg/dL 72   WBC Latest Ref Range: 4.0 - 11.0 10e9/L 4.6   Hemoglobin Latest Ref Range: 13.3 -  17.7 g/dL 12.4 (L)   Hematocrit Latest Ref Range: 40.0 - 53.0 % 37.8 (L)   Platelet Count Latest Ref Range: 150 - 450 10e9/L 176   RBC Count Latest Ref Range: 4.4 - 5.9 10e12/L 4.75   MCV Latest Ref Range: 78 - 100 fl 80   MCH Latest Ref Range: 26.5 - 33.0 pg 26.1 (L)   MCHC Latest Ref Range: 31.5 - 36.5 g/dL 32.8   RDW Latest Ref Range: 10.0 - 15.0 % 21.2 (H)   Diff Method Unknown Automated Method   % Neutrophils Latest Units: % 58.5   % Lymphocytes Latest Units: % 24.1   % Monocytes Latest Units: % 6.3   % Eosinophils Latest Units: % 10.0   % Basophils Latest Units: % 0.9   % Immature Granulocytes Latest Units: % 0.2   Nucleated RBCs Latest Ref Range: 0 /100 0   Absolute Neutrophil Latest Ref Range: 1.6 - 8.3 10e9/L 2.7   Absolute Lymphocytes Latest Ref Range: 0.8 - 5.3 10e9/L 1.1   Absolute Monocytes Latest Ref Range: 0.0 - 1.3 10e9/L 0.3   Absolute Eosinophils Latest Ref Range: 0.0 - 0.7 10e9/L 0.5   Absolute Basophils Latest Ref Range: 0.0 - 0.2 10e9/L 0.0   Abs Immature Granulocytes Latest Ref Range: 0 - 0.4 10e9/L 0.0   Absolute Nucleated RBC Unknown 0.0   % Retic Latest Ref Range: 0.5 - 2.0 % 1.3   Absolute Retic Latest Ref Range: 25 - 95 10e9/L 59.4         Imaging:  Saul, [employer]ood[employer] - 10/17/2020  4:56 PM CDT  INDICATION:  Cough. Shortness of breath. Swelling right leg.    TECHNIQUE:  CT chest pulmonary angiogram acquired with 100 cc of Omnipaque 350 IV contrast.    COMPARISON:  CT chest and abdomen without contrast 06/23/2020.     FINDINGS:    Cardiovascular structures: There is a filling defect consistent with a small amount of pulmonary embolus in the left upper lobe best seen on image 139 of series 3. No additional evidence of pulmonary embolus. Mild dilatation of the ascending thoracic aorta to 3.8 cm. The thoracic aorta is otherwise unremarkable. Stable heart size with mild coronary artery calcifications.     Mediastinum and vanessa: No mass or adenopathy.     Lungs: No pneumothorax. Mild  secretions in the trachea. Central airways are otherwise patent. New ground-glass opacity in the periphery of the right upper lobe on image 53 of series 3. Previous upper lobe ground-glass opacities have resolved. 4 mm right lower lobe nodule on image 218 is unchanged. Mild bibasilar scarring or atelectasis. Mild emphysema. Lungs are otherwise clear.     Pleura and pericardium: No effusions.     Chest wall and axilla: No mass or adenopathy.     Bones: No acute or suspicious osseous abnormality.     Upper abdomen: Incidental right hepatic lobe cyst is unchanged. Stable appearing postoperative changes of the stomach. Small left renal cyst is unchanged. There is fecal loading of the visualized colon.     IMPRESSION:    1. Small amount of left upper lobe pulmonary embolus.     2. New mild ground-glass opacity in the right upper lobe is likely infectious or inflammatory. The previously noted upper lobe ground-glass opacities have resolved.     Discussed with Dr. Boyce at the time of dictation.      Dictated by Sivakumar Hester MD @ 10/17/2020 4:54:37 PM      Interface, [employer] - 10/17/2020  5:58 PM CDT  INDICATION:   Swelling         TECHNIQUE:  A compression venous ultrasound exam was performed of both lower extremities using gray scale imaging, color Doppler and spectral Doppler analysis.    Comparison ultrasound report outside institution 06/20/2020. No images available. Patient has reported nonocclusive DVT in the right common femoral vein femoral and popliteal veins       FINDINGS:  Normal compressibility with blood flow in the right SFJ,common femoral vein and deep femoral vein.    Partial compressibility proximal right femoral vein with blood flow demonstrated. Noncompressibility in the mid femoral vein. Partial compressibility distal femoral vein with blood flow present.     Partial compressibility of the popliteal vein with blood flow present.  Partial compressibility of the proximal posterior tibial  vein.  Peroneal vein is not visualized.         Sonographic imaging of the left extremities demonstrates normal compressibility and color Doppler venous blood flow within the common femoral, deep femoral, and proximal greater saphenous veins. Within the thighs the femoral veins are patent and compressible. At a lower level the popliteal and posterior tibial veins also show normal compressibility and color Doppler venous blood flow.      Large anterior left knee fluid collections measuring 5.1 x 3.3 centimeters medially and 5 x 2.1 centimeters bilaterally.     IMPRESSION:    1. Nonocclusive DVT in the right proximal and distal femoral vein, popliteal vein posterior tibial vein .    Right mid femoral vein is noncompressible there is some wall thickening this is age indeterminate.     Findings within the right lower extremity are age indeterminate but could be chronic given that outside report from 06/20/2020 without imaging available did document nonocclusive DVT in the right common femoral vein, femoral and popliteal vein.      No evidence of LEFT deep vein thrombosis.    2. Anterior left knee fluid collections measuring 5.1 x 3.3 centimeters medially and 5 x 2.1 centimeters laterally.     Results called to Dr. Boyce on 10/17/2020 at 5:57 pm.        Dictated by Chasity Lynn MD @ Oct 17 2020  5:40PM    Assessment and Recommendation: -  1.  Recurrent GI bleeding with iron deficiency anemia-he continues to have mild anemia, but the ferritin is normal with a high iron saturation, reflecting the iron tablets that he has been taking.  He does not need to take as much daily iron as he is taking.  There is minimal advantage to taking more than 1 iron tablet a day, because taking more frequently up regulates hepatocyte in which decreases the ability of or absorbing iron.  I was concerned that he may need intravenous iron, but that is not the case.  I will contact him about decreasing iron tablet to 1 tablet a day.  Going  forward he will need to have continued monitoring, to see if we can prevent him from being coming iron deficient again.  Unfortunately I do not think there is much specific can be done regarding the bleeding he has had near the Billroth II anastomosis.  He should continue taking Tums and omeprazole.  His previous anticoagulation made the bleeding worse, however in June 2020 when he presented with GI bleeding he was not on anticoagulation, so it is not simply a case of anticoagulation causing bleeding.  -Decrease ferrous sulfate to 1 tablet daily with food  -Recheck CBC and iron studies in 1-2 months.    2.  Borderline B12 level-looking in care everywhere, many years ago he had low B12.  He does not recall ever being on B12 pills or shots.  On 10/18/2020 ([facility] labs), the B12 was only 198 picograms per mL.  MMA today is pending to see if he needs B12 supplementation.    3.  Chronic right lower extremity DVT, unprovoked, questionable pulmonary embolism-the right lower extremity DVT was first noticed June 2020 when admitted to [facility].  He does not recall any clear provoking factors and none are mentioned in notes.  Unfortunately, with anticoagulation he had recurrent GI bleeding.  Then when he presented in October he complained of shortness of breath which was probably related to his severe anemia, but a CT angio was done which showed a small left upper lobe PE-is not described as to whether this was segmental or subsegmental.  Repeat imaging shortly thereafter did not show a PE.  Is not clear to me if he ever actually had a PE.  He reports that he still has the IVC filter in place.  He did not have any clear indication for doing a thrombophilia evaluation.  With this complex history, I do not think it would .    At this point he has been off any anticoagulation for 2 to 3 months, and has no chest or leg symptoms to suggest recurrence or progression of DVT/PE.  A D-dimer is pending, although it might be  elevated if he is having any GI bleeding right now.  If the D-dimer is markedly elevated, then consideration could be given to restarting anticoagulation, which would require a major discussion with the patient.  Importantly, rivaroxaban (Xarelto) has been associated with GI bleeding much more so than the other direct oral anticoagulants. Do NOT use rivaroxaban (Xarelto) .  Therefore, if he does need long-term anticoagulation in the future, I would recommend placing him on apixaban.  If he needs postsurgical DVT prophylaxis, I recommend prophylactic enoxaparin or apixaban.  -D-dimer today pending    He would also benefit from a repeat ultrasound of the right leg to see if the thrombus has resolved.  That way if he presents with new leg pain or swelling, we have a better idea as to whether this is prothrombotic syndrome or new thrombus.  He has chronic left leg swelling related to the knee issues and has been shown on ultrasound of venous incompetency.  He would benefit from compression stocking.  -Right leg venous ultrasound next visit  -Compression stocking, graduated, 20-30 mmHg    4.  Hepatitis C-not clear with the status of this is-hepatitis C viral load pending      5.  [id number] has been followed at multiple different places over the past year, including [facility], [facility], close to his house), [facility] (for a while he was restricted to a [facility]), and the [facility].  I suspect some of this is due to urgent admissions for the GI bleeding.  I will encourage him to follow-up with me regarding the thrombosis and anemia issues, and try to decrease the number of health systems from when he is receiving care    -Follow-up with Dr. Boateng on 3/18/20 with right lower extremity venous ultrasound, labs, in person MD visit    Orquidea Boateng MD  Hematology

## 2021-02-04 NOTE — PROGRESS NOTES
Hematology Clinic consult note    Reasons for Consult: - anemia, Deep vein thrombosis R leg, pulmonary embolism (DVT/PE)    History of Present Illness: Mr. Martinez is a 65 year old man with a h/o recurrent DVT/PE and also recurrent GI bleeding. He was admitted [facility] 10/17-10/22 with dyspnea- Hgb 6.8. CT angio of chest showed small ROSE PE and RUL ground glass infiltrate.  RLE doppler showed DVT, age indeterminant. He was not taking rivaroxaban at the time of admission. COVID negative x 2. He has a h/o Billroth II surgery. He underwent EGD and had lesion clipped at GJ anastomosis. Colonospcopy was negative. He was transfused 1 unit and given Venofer. Also treated with heparin and discharged on rivaroxaban 15 mg bid     He was readmitted 10/28-11/6 for recurrent bleeding. Rivaroxaban was held. He was transfused several units of PRBCs and Pillcam showed bleeding at Billroth anastomosis. Repeat EGD showed friable mucosa, bleeding from other site- source not obvious. Mesenteric angiography - no active bleeding. Placed on PPi and told to avoid NSAIDS. Repeat chest CT angio on 10/28/20 showed no PE, but poor study. He was not discharged on anticoagulation.    Going back in his history, He was admitted at [facility] [place] and then at [facility] in June 2020 for hemorrhagic shock. He was on rivaroxaban at the time. This was held for several days, then restarted at 20 mg daily.     He was admitted at [facility] [place] December 2019. He was diagnosed with RLE DVT 12/27/19. F/u ultrasounds have shown persistent thrombus- never resolved. Negative chest CT angio for PE.  At that time, he also had a GI bleeding, so IVC filter was placed 12/28/19. He was discharged on rivaroxaban. He also has venous insufficiency on the left leg, but  Numerous L leg US have been negative for DVT, since 2011.  Over the past 10 years he has also had several episodes of Gi bleeding, related to the BII.     He has had no overt GI hemorrhage since November 2020.   "He has not taken any anticoagulation for the past couple months.  Today he says that he feels a little bit weak the past few days, has some dyspnea on exertion, occasional lightheadedness when he first stands up.  He is taking iron tablets 3 times a day and says his stools are black.  No nausea.  He complains of some early satiety and indigestion.  He is not having any pain or swelling in the right leg.  He has had chronic swelling in the left ankle and foot for many years and that skin has become a bit dark.  He has chronic significant swelling and pain in his left knee, ever since he had the knee replacement.  There is concern that the hardware is loose, and he has an appointment later today in orthopedics regarding the knee.    Past Medical History:  -Recurrent GI hemorrhage as above.   -RLE DVT Dec 2019, questionable PE October 2020  -s/p multiple revision total knee replacement, h/o brandee-prosthetic MRSA infection  -distant h/o drug abuse  -h/o hepatitis C-apparently not treated, he said it was no longer detectable  -h/o pancreatitis  - allo-antibodies- Anti-C, E and Jkb , difficult cross match    Medications:  -Ferrous sulfate 2 tablets twice daily  Flexeril  Gabapentin  Oxycodone cetirizine  Vitamin D  Hydrocortisone cream  Omeprazole  Tums    Family History: Brother with colon cancer, prostate cancer    Social: current smoker, 1/4 ppd, ETOH- no    Review of systems:  As in HPI.  Right shoulder pain, he is going to [facility] for an injection tomorrow. The rest of the > 10 point review of systems was negative.      PHYSICAL EXAMINATION:  /85   Pulse 63   Temp 98  F (36.7  C) (Tympanic)   Resp 18   Ht 1.981 m (6' 6\")   Wt 93.2 kg (205 lb 6.4 oz)   SpO2 99%   BMI 23.74 kg/m      General appearance:  Patient is 65 year old man in no acute distress.     HEENT:  No pallor, icterus.  Lungs:  Clear to auscultation bilaterally.   Heart:  Regular rate and rhythm; no S3 S4 or murmer.     Abdomen:  Positive bowel " sounds, soft and nontender, nondistended.  No hepatomegaly. No splenomegaly appreciated.    Extremities:  No leg, ankle, foot edema on the right.  No stasis changes on the right ankle or foot left knee has significant effusion with well-healed surgical scar, no erythema.  Trace edema right ankle, with some chronic stasis changes on the foot.  Good capillary reflux, the foot is warm.  Skin:  No rash, no petechiae or ecchymoses.    Labs:  Results for YOANDY GALVAN (MRN [id number]) as of 2/4/2021 13:04   Ref. Range 2/4/2021 12:18   Sodium Latest Ref Range: 133 - 144 mmol/L 143   Potassium Latest Ref Range: 3.4 - 5.3 mmol/L 3.8   Chloride Latest Ref Range: 94 - 109 mmol/L 112 (H)   Carbon Dioxide Latest Ref Range: 20 - 32 mmol/L 26   Urea Nitrogen Latest Ref Range: 7 - 30 mg/dL 16   Creatinine Latest Ref Range: 0.66 - 1.25 mg/dL 0.98   GFR Estimate Latest Ref Range: >60 mL/min/1.73_m2 80   GFR Estimate If Black Latest Ref Range: >60 mL/min/1.73_m2 >90   Calcium Latest Ref Range: 8.5 - 10.1 mg/dL 8.8   Anion Gap Latest Ref Range: 3 - 14 mmol/L 5   Albumin Latest Ref Range: 3.4 - 5.0 g/dL 3.7   Protein Total Latest Ref Range: 6.8 - 8.8 g/dL 7.3   Bilirubin Total Latest Ref Range: 0.2 - 1.3 mg/dL 0.3   Alkaline Phosphatase Latest Ref Range: 40 - 150 U/L 101   ALT Latest Ref Range: 0 - 70 U/L 19   AST Latest Ref Range: 0 - 45 U/L 22   Ferritin Latest Ref Range: 26 - 388 ng/mL 125   Iron Latest Ref Range: 35 - 180 ug/dL 252 (H)   Iron Binding Cap Latest Ref Range: 240 - 430 ug/dL 296   Iron Saturation Index Latest Ref Range: 15 - 46 % 85 (H)   Glucose Latest Ref Range: 70 - 99 mg/dL 72   WBC Latest Ref Range: 4.0 - 11.0 10e9/L 4.6   Hemoglobin Latest Ref Range: 13.3 - 17.7 g/dL 12.4 (L)   Hematocrit Latest Ref Range: 40.0 - 53.0 % 37.8 (L)   Platelet Count Latest Ref Range: 150 - 450 10e9/L 176   RBC Count Latest Ref Range: 4.4 - 5.9 10e12/L 4.75   MCV Latest Ref Range: 78 - 100 fl 80   MCH Latest Ref Range: 26.5 - 33.0 pg  26.1 (L)   MCHC Latest Ref Range: 31.5 - 36.5 g/dL 32.8   RDW Latest Ref Range: 10.0 - 15.0 % 21.2 (H)   Diff Method Unknown Automated Method   % Neutrophils Latest Units: % 58.5   % Lymphocytes Latest Units: % 24.1   % Monocytes Latest Units: % 6.3   % Eosinophils Latest Units: % 10.0   % Basophils Latest Units: % 0.9   % Immature Granulocytes Latest Units: % 0.2   Nucleated RBCs Latest Ref Range: 0 /100 0   Absolute Neutrophil Latest Ref Range: 1.6 - 8.3 10e9/L 2.7   Absolute Lymphocytes Latest Ref Range: 0.8 - 5.3 10e9/L 1.1   Absolute Monocytes Latest Ref Range: 0.0 - 1.3 10e9/L 0.3   Absolute Eosinophils Latest Ref Range: 0.0 - 0.7 10e9/L 0.5   Absolute Basophils Latest Ref Range: 0.0 - 0.2 10e9/L 0.0   Abs Immature Granulocytes Latest Ref Range: 0 - 0.4 10e9/L 0.0   Absolute Nucleated RBC Unknown 0.0   % Retic Latest Ref Range: 0.5 - 2.0 % 1.3   Absolute Retic Latest Ref Range: 25 - 95 10e9/L 59.4         Imaging:  [employer] - 10/17/2020  4:56 PM CDT  INDICATION:  Cough. Shortness of breath. Swelling right leg.    TECHNIQUE:  CT chest pulmonary angiogram acquired with 100 cc of Omnipaque 350 IV contrast.    COMPARISON:  CT chest and abdomen without contrast 06/23/2020.     FINDINGS:    Cardiovascular structures: There is a filling defect consistent with a small amount of pulmonary embolus in the left upper lobe best seen on image 139 of series 3. No additional evidence of pulmonary embolus. Mild dilatation of the ascending thoracic aorta to 3.8 cm. The thoracic aorta is otherwise unremarkable. Stable heart size with mild coronary artery calcifications.     Mediastinum and vanessa: No mass or adenopathy.     Lungs: No pneumothorax. Mild secretions in the trachea. Central airways are otherwise patent. New ground-glass opacity in the periphery of the right upper lobe on image 53 of series 3. Previous upper lobe ground-glass opacities have resolved. 4 mm right lower lobe nodule on image 218 is unchanged.  Mild bibasilar scarring or atelectasis. Mild emphysema. Lungs are otherwise clear.     Pleura and pericardium: No effusions.     Chest wall and axilla: No mass or adenopathy.     Bones: No acute or suspicious osseous abnormality.     Upper abdomen: Incidental right hepatic lobe cyst is unchanged. Stable appearing postoperative changes of the stomach. Small left renal cyst is unchanged. There is fecal loading of the visualized colon.     IMPRESSION:    1. Small amount of left upper lobe pulmonary embolus.     2. New mild ground-glass opacity in the right upper lobe is likely infectious or inflammatory. The previously noted upper lobe ground-glass opacities have resolved.     Discussed with Dr. Boyce at the time of dictation.      Dictated by Sivakumar Hester MD @ 10/17/2020 4:54:37 PM      Interface, [employer] - 10/17/2020  5:58 PM CDT  INDICATION:   Swelling         TECHNIQUE:  A compression venous ultrasound exam was performed of both lower extremities using gray scale imaging, color Doppler and spectral Doppler analysis.    Comparison ultrasound report outside institution 06/20/2020. No images available. Patient has reported nonocclusive DVT in the right common femoral vein femoral and popliteal veins       FINDINGS:  Normal compressibility with blood flow in the right SFJ,common femoral vein and deep femoral vein.    Partial compressibility proximal right femoral vein with blood flow demonstrated. Noncompressibility in the mid femoral vein. Partial compressibility distal femoral vein with blood flow present.     Partial compressibility of the popliteal vein with blood flow present.  Partial compressibility of the proximal posterior tibial vein.  Peroneal vein is not visualized.         Sonographic imaging of the left extremities demonstrates normal compressibility and color Doppler venous blood flow within the common femoral, deep femoral, and proximal greater saphenous veins. Within the thighs the femoral  veins are patent and compressible. At a lower level the popliteal and posterior tibial veins also show normal compressibility and color Doppler venous blood flow.      Large anterior left knee fluid collections measuring 5.1 x 3.3 centimeters medially and 5 x 2.1 centimeters bilaterally.     IMPRESSION:    1. Nonocclusive DVT in the right proximal and distal femoral vein, popliteal vein posterior tibial vein .    Right mid femoral vein is noncompressible there is some wall thickening this is age indeterminate.     Findings within the right lower extremity are age indeterminate but could be chronic given that outside report from 06/20/2020 without imaging available did document nonocclusive DVT in the right common femoral vein, femoral and popliteal vein.      No evidence of LEFT deep vein thrombosis.    2. Anterior left knee fluid collections measuring 5.1 x 3.3 centimeters medially and 5 x 2.1 centimeters laterally.     Results called to Dr. Boyce on 10/17/2020 at 5:57 pm.        Dictated by Chasity Lynn MD @ Oct 17 2020  5:40PM    Assessment and Recommendation: -  1.  Recurrent GI bleeding with iron deficiency anemia-he continues to have mild anemia, but the ferritin is normal with a high iron saturation, reflecting the iron tablets that he has been taking.  He does not need to take as much daily iron as he is taking.  There is minimal advantage to taking more than 1 iron tablet a day, because taking more frequently up regulates hepatocyte in which decreases the ability of or absorbing iron.  I was concerned that he may need intravenous iron, but that is not the case.  I will contact him about decreasing iron tablet to 1 tablet a day.  Going forward he will need to have continued monitoring, to see if we can prevent him from being coming iron deficient again.  Unfortunately I do not think there is much specific can be done regarding the bleeding he has had near the Billroth II anastomosis.  He should continue  taking Tums and omeprazole.  His previous anticoagulation made the bleeding worse, however in June 2020 when he presented with GI bleeding he was not on anticoagulation, so it is not simply a case of anticoagulation causing bleeding.  -Decrease ferrous sulfate to 1 tablet daily with food  -Recheck CBC and iron studies in 1-2 months.    2.  Borderline B12 level-looking in care everywhere, many years ago he had low B12.  He does not recall ever being on B12 pills or shots.  On 10/18/2020 ([facility] labs), the B12 was only 198 picograms per mL.  MMA today is pending to see if he needs B12 supplementation.    3.  Chronic right lower extremity DVT, unprovoked, questionable pulmonary embolism-the right lower extremity DVT was first noticed June 2020 [place] [facility] [place]  He does not recall any clear provoking factors and none are mentioned in notes.  Unfortunately, with anticoagulation he had recurrent GI bleeding.  Then when he presented in October he complained of shortness of breath which was probably related to his severe anemia, but a CT angio was done which showed a small left upper lobe PE-is not described as to whether this was segmental or subsegmental.  Repeat imaging shortly thereafter did not show a PE.  Is not clear to me if he ever actually had a PE.  He reports that he still has the IVC filter in place.  He did not have any clear indication for doing a thrombophilia evaluation.  With this complex history, I do not think it would .    At this point he has been off any anticoagulation for 2 to 3 months, and has no chest or leg symptoms to suggest recurrence or progression of DVT/PE.  A D-dimer is pending, although it might be elevated if he is having any GI bleeding right now.  If the D-dimer is markedly elevated, then consideration could be given to restarting anticoagulation, which would require a major discussion with the patient.  Importantly, rivaroxaban (Xarelto) has been associated with  GI bleeding much more so than the other direct oral anticoagulants. Do NOT use rivaroxaban (Xarelto) .  Therefore, if he does need long-term anticoagulation in the future, I would recommend placing him on apixaban.  If he needs postsurgical DVT prophylaxis, I recommend prophylactic enoxaparin or apixaban.  -D-dimer today pending    He would also benefit from a repeat ultrasound of the right leg to see if the thrombus has resolved.  That way if he presents with new leg pain or swelling, we have a better idea as to whether this is prothrombotic syndrome or new thrombus.  He has chronic left leg swelling related to the knee issues and has been shown on ultrasound of venous incompetency.  He would benefit from compression stocking.  -Right leg venous ultrasound next visit  -Compression stocking, graduated, 20-30 mmHg    4.  Hepatitis C-not clear with the status of this is-hepatitis C viral load pending      5.  Pheqfh-[id number] has been followed at multiple different places over the past year, including [facility], [facility], close to his house), [facility] (for a while he was restricted to a [facility] [place]), and the [facility].  I suspect some of this is due to urgent admissions for the GI bleeding.  I will encourage him to follow-up with me regarding the thrombosis and anemia issues, and try to decrease the number of health systems from when he is receiving care    -Follow-up with Dr. Boateng on 3/18/20 with right lower extremity venous ultrasound, labs, in person MD visit    Orquidea Boateng MD  Hematology

## 2021-02-04 NOTE — NURSING NOTE
Chief Complaint   Patient presents with     Blood Draw     labs drawn via  by RN in lab      There were no vitals taken for this visit.    Labs drawn via venipuncture. Pt tolerated well & checked in for next appointment.    Linh Zhao RN on 2/4/2021 at 12:25 PM

## 2021-02-04 NOTE — LETTER
2/4/2021         RE: Casey Martinez  [address]  [facility] [place] [facility] [address]        Dear Colleague,    Thank you for referring your patient, Casey Martinez, to the [facility] [place]. Please see a copy of my visit note below.    Chief Complaint: RECHECK (follow up from virtual visit for a left knee aspiration )       HPI: Casey Martinez returns today in follow-up for his knee. 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 Presents for aspiration and drawing of inflammatory markers.     Procedure Note:    After informed consent was obtained the patient's left knee was aspirated of aboug 10 ml of benign appearing fluid using a superior-medial approach.  Sterile technique was used.  The patient tolerated the procedure well.    Medications and allergies are documented in the EMR and have been reviewed.    Current Outpatient Medications:      cetirizine (ZYRTEC) 10 MG tablet, Take 10 mg by mouth daily, Disp: , Rfl:      cholecalciferol 25 MCG (1000 UT) TABS, Take 2,000 Units by mouth daily, Disp: , Rfl:      diphenhydrAMINE (BENADRYL) 25 MG capsule, Take 25 mg by mouth daily, Disp: , Rfl:      doxepin (SINEQUAN) 10 MG capsule, Take 10 mg by mouth daily, Disp: , Rfl:      Ferrous Sulfate 324 MG TBEC, Take 324 mg by mouth daily, Disp: , Rfl:      gabapentin (NEURONTIN) 300 MG capsule, Take 900 mg by mouth daily, Disp: , Rfl:      hydrocortisone (CORTAID) 1 % external cream, Apply topically daily, Disp: , Rfl:      hydrOXYzine (VISTARIL) 25 MG capsule, Take 25-50 mg by mouth as needed, Disp: , Rfl:      ibuprofen (ADVIL/MOTRIN) 600 MG tablet, Take 600 mg by mouth as needed, Disp: , Rfl:      ketoconazole (NIZORAL) 2 % external cream, Apply topically daily, Disp: , Rfl:      lidocaine (XYLOCAINE) 5 % ointment, Apply  topically daily. Apply a pea size amount to ulcer when changing the dressing on the wound., Disp: 35.44 g, Rfl: 0      "lisinopril-hydrochlorothiazide (ZESTORETIC) 20-25 MG tablet, Take 1 tablet by mouth once daily, Disp: , Rfl:      naproxen sodium (ALEVE) 220 MG capsule, Take 220 mg by mouth 2 times daily (with meals)., Disp: , Rfl:      omeprazole (PRILOSEC) 20 MG DR capsule, Take by mouth daily, Disp: , Rfl:      oxyCODONE IR (ROXICODONE) 10 MG tablet, daily, Disp: , Rfl:      Specialty Vitamins Products (VITAMINS FOR HAIR) TABS, Take 1 tablet by mouth, Disp: , Rfl:      triamcinolone (KENALOG) 0.025 % cream, , Disp: , Rfl:   Allergies: Meperidine, Blood-group specific substance, and Morphine sulfate    Physical Exam:  On physical examination the patient appears the stated age, is in no acute distress, affect is appropriate, and breathing is non-labored.  Ht 1.981 m (6' 6\")   Wt 93.2 kg (205 lb 6.4 oz)   BMI 23.74 kg/m    Body mass index is 23.74 kg/m .    Assessment: working up for infection. Fluid sent for cell count, diff, cx. Blood drawn for CRP and SED  Plan: follow-up for results and discussion of treatment options as per the infection w/u. All the patients questions were answered to the best of my abiliy.    No ref. provider found    Again, thank you for allowing me to participate in the care of your patient.        Sincerely,        Jose David Sheehan MD    "

## 2021-02-04 NOTE — NURSING NOTE
"Reason For Visit:   Chief Complaint   Patient presents with     RECHECK     follow up from virtual visit for a left knee aspiration        Ht 1.981 m (6' 6\")   Wt 93.2 kg (205 lb 6.4 oz)   BMI 23.74 kg/m           Moise Lucio ATC  "

## 2021-02-04 NOTE — NURSING NOTE
"Oncology Rooming Note    February 4, 2021 11:05 AM   Casey Martinez is a 65 year old male who presents for:    Chief Complaint   Patient presents with     Oncology Clinic Visit     [facility] NEW - ANEMIA     Initial Vitals: /85   Pulse 63   Temp 98  F (36.7  C) (Tympanic)   Resp 18   Ht 1.981 m (6' 6\")   Wt 93.2 kg (205 lb 6.4 oz)   SpO2 99%   BMI 23.74 kg/m   Estimated body mass index is 23.74 kg/m  as calculated from the following:    Height as of this encounter: 1.981 m (6' 6\").    Weight as of this encounter: 93.2 kg (205 lb 6.4 oz). Body surface area is 2.26 meters squared.  Worst Pain (10) Comment: Data Unavailable   No LMP for male patient.  Allergies reviewed: Yes  Medications reviewed: Yes    Medications: Medication refills not needed today.  Pharmacy name entered into EPIC: [facility] - [place], [place] - [address] [place] [address]    Clinical concerns: New patient.       Alayna Talavera MA            "

## 2021-02-05 ENCOUNTER — PATIENT OUTREACH (OUTPATIENT)
Dept: ONCOLOGY | Facility: CLINIC | Age: 66
End: 2021-02-05

## 2021-02-05 NOTE — PROGRESS NOTES
Writer placed call to Casey in response to recent clinic visit and plan of care. No answer received, voicemail is full. Will call back on Monday when back in office.

## 2021-02-06 LAB
HCV RNA SERPL NAA+PROBE-ACNC: NORMAL [IU]/ML
HCV RNA SERPL NAA+PROBE-LOG IU: NORMAL LOG IU/ML

## 2021-02-08 ENCOUNTER — PATIENT OUTREACH (OUTPATIENT)
Dept: ONCOLOGY | Facility: CLINIC | Age: 66
End: 2021-02-08

## 2021-02-08 NOTE — PROGRESS NOTES
Writer placed call to Casey regarding recent labs and iron supplementation. Per Dr Boateng, his hemoglobin is close to WNL and he may reduce his iron pills to once daily. We reviewed this and there were no further questions. He is in the process of getting set up for labs and repeat US of leg with follow up with Dr Boateng on March 18.

## 2021-02-09 LAB
BACTERIA SPEC CULT: NO GROWTH
Lab: NORMAL
SPECIMEN SOURCE: NORMAL

## 2021-02-11 LAB — METHYLMALONATE SERPL-SCNC: 0.23 UMOL/L (ref 0–0.4)

## 2021-02-18 LAB
BACTERIA SPEC CULT: NORMAL
Lab: NORMAL
SPECIMEN SOURCE: NORMAL

## 2021-03-03 ENCOUNTER — ANCILLARY PROCEDURE (OUTPATIENT)
Dept: ULTRASOUND IMAGING | Facility: CLINIC | Age: 66
End: 2021-03-03
Attending: INTERNAL MEDICINE
Payer: COMMERCIAL

## 2021-03-03 DIAGNOSIS — I82.409 DVT (DEEP VENOUS THROMBOSIS) (H): ICD-10-CM

## 2021-03-03 PROCEDURE — 93971 EXTREMITY STUDY: CPT | Mod: RT | Performed by: RADIOLOGY

## 2021-03-05 NOTE — PROGRESS NOTES
Chief Complaint: RECHECK (follow up from virtual visit for a left knee aspiration )       HPI: Casey Martinez returns today in follow-up for his knee. 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 Presents for aspiration and drawing of inflammatory markers.     Procedure Note:    After informed consent was obtained the patient's left knee was aspirated of aboug 10 ml of benign appearing fluid using a superior-medial approach.  Sterile technique was used.  The patient tolerated the procedure well.    Medications and allergies are documented in the EMR and have been reviewed.    Current Outpatient Medications:      cetirizine (ZYRTEC) 10 MG tablet, Take 10 mg by mouth daily, Disp: , Rfl:      cholecalciferol 25 MCG (1000 UT) TABS, Take 2,000 Units by mouth daily, Disp: , Rfl:      diphenhydrAMINE (BENADRYL) 25 MG capsule, Take 25 mg by mouth daily, Disp: , Rfl:      doxepin (SINEQUAN) 10 MG capsule, Take 10 mg by mouth daily, Disp: , Rfl:      Ferrous Sulfate 324 MG TBEC, Take 324 mg by mouth daily, Disp: , Rfl:      gabapentin (NEURONTIN) 300 MG capsule, Take 900 mg by mouth daily, Disp: , Rfl:      hydrocortisone (CORTAID) 1 % external cream, Apply topically daily, Disp: , Rfl:      hydrOXYzine (VISTARIL) 25 MG capsule, Take 25-50 mg by mouth as needed, Disp: , Rfl:      ibuprofen (ADVIL/MOTRIN) 600 MG tablet, Take 600 mg by mouth as needed, Disp: , Rfl:      ketoconazole (NIZORAL) 2 % external cream, Apply topically daily, Disp: , Rfl:      lidocaine (XYLOCAINE) 5 % ointment, Apply  topically daily. Apply a pea size amount to ulcer when changing the dressing on the wound., Disp: 35.44 g, Rfl: 0     lisinopril-hydrochlorothiazide (ZESTORETIC) 20-25 MG tablet, Take 1 tablet by mouth once daily, Disp: , Rfl:      naproxen sodium (ALEVE) 220 MG capsule, Take 220 mg by mouth 2 times daily (with meals)., Disp: , Rfl:      omeprazole (PRILOSEC) 20 MG DR capsule, Take by mouth daily, Disp: ,  "Rfl:      oxyCODONE IR (ROXICODONE) 10 MG tablet, daily, Disp: , Rfl:      Specialty Vitamins Products (VITAMINS FOR HAIR) TABS, Take 1 tablet by mouth, Disp: , Rfl:      triamcinolone (KENALOG) 0.025 % cream, , Disp: , Rfl:   Allergies: Meperidine, Blood-group specific substance, and Morphine sulfate    Physical Exam:  On physical examination the patient appears the stated age, is in no acute distress, affect is appropriate, and breathing is non-labored.  Ht 1.981 m (6' 6\")   Wt 93.2 kg (205 lb 6.4 oz)   BMI 23.74 kg/m    Body mass index is 23.74 kg/m .    Assessment: working up for infection. Fluid sent for cell count, diff, cx. Blood drawn for CRP and SED  Plan: follow-up for results and discussion of treatment options as per the infection w/u. All the patients questions were answered to the best of my abiliy.    No ref. provider found    "

## 2021-03-08 DIAGNOSIS — Z86.718 HISTORY OF DEEP VENOUS THROMBOSIS: ICD-10-CM

## 2021-03-08 DIAGNOSIS — D50.0 IRON DEFICIENCY ANEMIA DUE TO CHRONIC BLOOD LOSS: Primary | ICD-10-CM

## 2021-03-25 ENCOUNTER — TELEPHONE (OUTPATIENT)
Dept: ORTHOPEDICS | Facility: CLINIC | Age: 66
End: 2021-03-25

## 2021-03-25 NOTE — TELEPHONE ENCOUNTER
[facility]    Phone Message    May a detailed message be left on voicemail: yes     Reason for Call: Other: Patient is confused on what is happening with his plan of care. Patient upset that nobody has connected with him since his last appt when he was told he will need surgery. Please connect with patient to discuss schedule and after care .     Action Taken: Message routed to:  [facility] ([facility] ortho    Travel Screening: Not Applicable

## 2021-03-26 NOTE — TELEPHONE ENCOUNTER
"Returned patient's call. Explained that Dr. Sheehan's plan was to do a surgery if his labs came back positive for an infection; His labs were negative for infection. Dr. Sheehan's notes support this. Patient insists that he was told that his \"knee came apart\" and that he needed a joint replacement. Patient would like to know if this is going to happen or if he should get a second opinion. Writer will get Dr. Sheehan's input and then reach patient out to patient with an update. Understanding expressed.   "

## 2021-03-30 ENCOUNTER — TELEPHONE (OUTPATIENT)
Dept: ORTHOPEDICS | Facility: CLINIC | Age: 66
End: 2021-03-30

## 2021-03-30 NOTE — TELEPHONE ENCOUNTER
RN called patient per Dr. Ramos's request. Not able to leave message as his inbox is full. Don't have any other numbers to call.    Leonor Tim RN

## 2021-03-31 ENCOUNTER — TELEPHONE (OUTPATIENT)
Dept: ORTHOPEDICS | Facility: CLINIC | Age: 66
End: 2021-03-31

## 2021-03-31 NOTE — TELEPHONE ENCOUNTER
RN called patient but same thing as previous phone call, went straight to voicemail but then inbox is full and RN is unable to leave a message.  If patient calls back, please tell patient the soonest he can see Dr. Ramos will be in the last month of April for new. Patient has the option of going to [facility] as well.    Leonor Tim RN

## 2021-04-20 ENCOUNTER — OFFICE VISIT (OUTPATIENT)
Dept: ORTHOPEDICS | Facility: CLINIC | Age: 66
End: 2021-04-20
Payer: MEDICARE

## 2021-04-20 VITALS
SYSTOLIC BLOOD PRESSURE: 118 MMHG | DIASTOLIC BLOOD PRESSURE: 68 MMHG | HEIGHT: 78 IN | WEIGHT: 208 LBS | BODY MASS INDEX: 24.07 KG/M2

## 2021-04-20 DIAGNOSIS — S91.002A WOUND OF LEFT ANKLE, INITIAL ENCOUNTER: ICD-10-CM

## 2021-04-20 DIAGNOSIS — Z96.659 FAILED TOTAL KNEE REPLACEMENT, INITIAL ENCOUNTER (H): Primary | ICD-10-CM

## 2021-04-20 DIAGNOSIS — T84.018A FAILED TOTAL KNEE REPLACEMENT, INITIAL ENCOUNTER (H): Primary | ICD-10-CM

## 2021-04-20 PROBLEM — T84.038S ASEPTIC LOOSENING OF PROSTHETIC KNEE, SEQUELA: Status: ACTIVE | Noted: 2017-07-12

## 2021-04-20 PROBLEM — Z98.0 H/O BILLROTH II OPERATION: Status: ACTIVE | Noted: 2019-12-28

## 2021-04-20 PROBLEM — I82.509 CHRONIC DEEP VEIN THROMBOSIS (DVT) (H): Status: ACTIVE | Noted: 2020-06-21

## 2021-04-20 PROBLEM — N17.9 AKI (ACUTE KIDNEY INJURY) (H): Status: ACTIVE | Noted: 2020-10-28

## 2021-04-20 PROBLEM — Z72.0 TOBACCO ABUSE: Status: ACTIVE | Noted: 2019-12-28

## 2021-04-20 PROBLEM — J43.9 EMPHYSEMA OF LUNG (H): Status: ACTIVE | Noted: 2020-06-21

## 2021-04-20 PROBLEM — N40.0 BENIGN PROSTATIC HYPERPLASIA: Status: ACTIVE | Noted: 2019-12-28

## 2021-04-20 PROBLEM — F10.10 ALCOHOL ABUSE: Status: ACTIVE | Noted: 2019-12-28

## 2021-04-20 PROBLEM — Z87.39 HISTORY OF INFECTION OF TOTAL JOINT PROSTHESIS OF KNEE: Status: ACTIVE | Noted: 2017-07-17

## 2021-04-20 PROBLEM — M54.9 CHRONIC BACK PAIN: Status: ACTIVE | Noted: 2019-12-28

## 2021-04-20 PROBLEM — G93.89 ENCEPHALOMALACIA: Status: ACTIVE | Noted: 2020-06-21

## 2021-04-20 PROBLEM — R06.02 SOB (SHORTNESS OF BREATH): Status: ACTIVE | Noted: 2020-10-17

## 2021-04-20 PROBLEM — I82.411 ACUTE DEEP VEIN THROMBOSIS (DVT) OF FEMORAL VEIN OF RIGHT LOWER EXTREMITY (H): Status: ACTIVE | Noted: 2019-12-28

## 2021-04-20 PROBLEM — I82.441 ACUTE DEEP VEIN THROMBOSIS (DVT) OF TIBIAL VEIN OF RIGHT LOWER EXTREMITY (H): Status: ACTIVE | Noted: 2019-12-28

## 2021-04-20 PROBLEM — K27.9 PEPTIC ULCER: Status: ACTIVE | Noted: 2019-12-28

## 2021-04-20 PROBLEM — K92.2 ACUTE GASTROINTESTINAL HEMORRHAGE: Status: ACTIVE | Noted: 2019-12-28

## 2021-04-20 PROBLEM — I26.99 ACUTE PULMONARY EMBOLISM WITHOUT ACUTE COR PULMONALE (H): Status: ACTIVE | Noted: 2020-10-17

## 2021-04-20 PROBLEM — B18.2 CHRONIC HEPATITIS C WITHOUT HEPATIC COMA (H): Status: ACTIVE | Noted: 2019-12-28

## 2021-04-20 PROBLEM — R33.9 RETENTION OF URINE: Status: ACTIVE | Noted: 2017-07-30

## 2021-04-20 PROBLEM — I95.9 HYPOTENSION: Status: ACTIVE | Noted: 2020-06-18

## 2021-04-20 PROBLEM — G89.29 CHRONIC BACK PAIN: Status: ACTIVE | Noted: 2019-12-28

## 2021-04-20 PROCEDURE — 99204 OFFICE O/P NEW MOD 45 MIN: CPT | Performed by: STUDENT IN AN ORGANIZED HEALTH CARE EDUCATION/TRAINING PROGRAM

## 2021-04-20 ASSESSMENT — KOOS JR
GOING UP OR DOWN STAIRS: EXTREME
BENDING TO THE FLOOR TO PICK UP OBJECT: EXTREME
KOOS JR SCORING: 0
RISING FROM SITTING: EXTREME
STANDING UPRIGHT: EXTREME
STRAIGHTENING KNEE FULLY: EXTREME
TWISING OR PIVOTING ON KNEE: EXTREME
HOW SEVERE IS YOUR KNEE STIFFNESS AFTER FIRST WAKING IN MORNING: EXTREME

## 2021-04-20 ASSESSMENT — MIFFLIN-ST. JEOR: SCORE: 1861.73

## 2021-04-20 NOTE — PATIENT INSTRUCTIONS
Referrals placed for consultations with Vascular Surgery, Infectious Disease.     Recommend discussion of smoking cessation, and diabetes work up with PCP.     Follow up with Dr. Ramos after completion of Infectious Disease and Vascular Surgery consultation for further planning of left TKA revision.     Call my office with any questions or concerns, [phone].

## 2021-04-20 NOTE — PROGRESS NOTES
[facility]  Orthopaedic Surgery Consultation by Archie Ramos M.D.    Casey aMrtinez MRN# [id number]   Age: 65 year old YOB: 1955     Requesting physician: Referred Self  No primary care provider on file.     Background history:  DX:  1. Osteoarthritis left knee  2. Osteoarthritis right shoulder with significant arthritic changes of glenohumeral and AC joint.  Most likely presence of rotator cuff tear.  3. Recurrent DVT/PE: Advise Dr. Boateng: Do not use Xarelto.  Postoperative prophylactic enoxaparin or apixaban is advised.  4. Recurrent GI bleeding  5. Hepatitis C, no longer detectable  6. Pancreatitis  7. 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 3/3/2011, left total knee arthroplasty, Dr. Braden[place] [facility] (Grace and Nephew Renée II total knee arthroplasty with a size 8 cemented cruciate-retaining femoral component, a size 8 cemented keeled tibial component, a 35-mm resurfacing polyethylene patella, and a 9-mm cruciate retaining tibial polyethylene insert)  2. 4/4/2021, I&D and DAIR  left total knee arthroplasty, Dr. Braden, [facility]  3. 6/9/2011, Removal of left total knee arthroplasty, I&D left total knee arthroplasty, Dr. Braden, [facility]  4. 6/30/2011, I&D left total knee arthroplasty, Dr. Braden, [facility]  5. 8/25/2011, revision left total knee arthroplasty, Dr. Braden, [facility] (Grace & Nephew Renée II Revision Knee system: Size 7 Constrained femur with a  mm stem and; Size 7 tibial tray with a 14 x 200mm stem; 11 mm PS poly insert)  6. 7/17/2017, removal of left total knee arthroplasty femoral component and placement of antibiotic spacer, Dr. Braden, [facility]. MSSA +  7. 7/20/2017, revision left total knee arthroplasty, Dr. Braden [facility].  (Smith & Nephew Legion Revision Knee system: Size 6 revision femur with a  mm stem and 4 mm offset , 21 mm constrained poly insert.)               History of Present Illness:   65 year old male who was referred to our clinic  by Dr. Sheehan because of loosenig of his stemmed femoral component with the question of revising the left total knee arthroplasty to a hinged prosthesis..  He has a complex medical history concerning his knee with his index knee was done in 2011, and he had a deep infection within a few months that was treated by a two-stage resection arthroplasty - reimplantation. He had obvious movement of his femoral stem which was pushing through the anterolateral femoral diaphysis. He is admitted for revision surgeryand had explantation of his femoral component. He had a temporary PMMA spacer inserted. Some, but not all, of his intraoperative cultures were growing MSSA, but his synovial fluid analysis was completely clear. Has has been seen by ID and it is felt that it is appropriate to proceed with re-implantation, with plans to continue his antibiotics.  On 7/20/2017 a highly constrained femoral component was placed.    Patient presents because of persistent and progressive pain of left knee.  He describes instability in both varus and valgus direction as well as in AP direction.  Pain is present continuously and worse during ambulation.  He needs assistive devices to move around.  All he can do is mobilize in and around the house.  Patient cannot fully extend his knee which he says has been present since 2017.     To complicate things further patient is having an ulcer on the medial side of left ankle.  She states he has had these in the past and they take approximately 6 months to heal.  He denies being diagnosed with diabetes.  He does smoke.  He has never had a vascular work-up done.    Social:   Occupation: not currently working  Living situation: lives with his girlfriend  Hobbies / Sports: watching TV     Smoking: Yes  Alcohol: No  Illicit drug use: No         Physical Exam:     EXAMINATION pertinent findings:   PSYCH: Pleasant, healthy-appearing, alert, oriented x3, cooperative. Normal mood and affect.  VITAL SIGNS:  There were no vitals taken for this visit.  Reviewed nursing intake notes.   There is no height or weight on file to calculate BMI.  RESP: non labored breathing   ABD: benign, soft, non-tender, no acute peritoneal findings  SKIN: grossly normal     MUSCULOSKELETAL:   Alignment: Neutral  Gait: Antalgic with shortened stance over left lower extremity.  Clear limp.  The left hip exhibits a full range of motion.     L knee: -30-0  .  Presence of extensor deficit.  Passively the knee can be brought to full extension.   No redness, warmth or skin changes present.  Wide but well-healed incision.  No clear palpable divot at suprapatellar pole.  Effusion Yes.  Grossly unstable knee and in both ML and AP direction.  PF tracking seems reasonable with dozens of some crepitus.     Left LE:   Thigh and leg compartments soft and compressible   +Quad/TA/GSC/FHL/EHL   SILT DP/SP/Rosalind/Saph/Tib nerve distributions   Dorsalis pedis pulse is not palpable.  Presence of ulcer medial ankle.                         Data:   All laboratory data reviewed    Aspiration results 2/4/2021:     Aerobic/anaerobic cultures no growth to date    Gram stain negative    Cell count differential  with 35% neutrophils        All imaging studies reviewed by me personally.    XR knee left 1/14/2021:  Status post revision left total knee arthroplasty.  Progressive loosening and subsidence of femoral component over time compared to previous imaging studies.  Fracturing of medial condyle remnant compared to immediate postoperative imaging studies.  Tibial component seems well fixed.  Presence of patella baja most likely due to insufficiency of quadriceps tendon.         Assessment and Plan:   Assessment:  65-year-old male with past medical history notable for GI bleeds, recurrent DVT/PE and a multiply revised left total knee arthroplasty due to MSSA infection with progressive pain and instability due to loosening of femoral component.  Initial  work-up does not seem consistent with recurrent infection however a high index of suspicion is present.  Besides the loosening of the femoral component whether this being septic or aseptic the extensor mechanism is compromised at the level of the quadriceps tendon.  Furthermore, patient is a smoker and has an ulcer on the medial side of ankle.      Plan:  I extensively discussed my findings with the patient.  There are multiple complicating factors in his past medical and surgical history to proceed with a 1 stage revision to hinged prosthesis.  First I am very concerned about a possible persistent infection in spite of the negative aspiration results.  Secondly, the vascular status of his left lower extremity seems suboptimal because of his medial ankle ulcer and would necessitate further evaluation and treatment before even considering revision surgery.  Third patient would need to stop smoking which he articulates is willing to do but would need help of his PCP.  Lastly I am concerned about the compromised extensor mechanism and patella baja.     It is my proposal to have patient evaluated by a vascular surgeon in order to optimize wound healing of his left lower extremity.  Furthermore I would like him to be evaluated by the infectious disease doctors to ascertain there is no other source of infection/seeding that explains the recurrent infections.  Tomorrow patient is seeing his primary care physician where he will discuss work-up for diabetes and smoking cessation.    From an orthopedic perspective instead of pursuing a 1 stage revision to rotating hinged prosthesis immediately it may be worth considering a two-stage revision with temporary static spacer around T2 intramedullary nail.  During explantation and attempt to improve the extensor mechanism could be done and given the appropriate rest to heal well eradicating a potential infection.  I will discuss such an option with my colleagues as well.      We  will follow-up with patient once he has been seen by vascular surgeon, PCP and infectious disease department.      Thank you for your referral.    Archie Ramos MD, PhD     Adult Reconstruction  [facility] Department of Orthopaedic Surgery  Pager [phone]      DATA for DOCUMENTATION:         Past Medical History:   There is no problem list on file for this patient.    No past medical history on file.    Also see scanned health assessment forms.       Past Surgical History:   No past surgical history on file.         Social History:     Social History     Socioeconomic History     Marital status:      Spouse name: Not on file     Number of children: Not on file     Years of education: Not on file     Highest education level: Not on file   Occupational History     Not on file   Social Needs     Financial resource strain: Not on file     Food insecurity     Worry: Not on file     Inability: Not on file     Transportation needs     Medical: Not on file     Non-medical: Not on file   Tobacco Use     Smoking status: Current Every Day Smoker     Smokeless tobacco: Never Used   Substance and Sexual Activity     Alcohol use: Never     Frequency: Never     Drug use: Never     Sexual activity: Not on file   Lifestyle     Physical activity     Days per week: Not on file     Minutes per session: Not on file     Stress: Not on file   Relationships     Social connections     Talks on phone: Not on file     Gets together: Not on file     Attends [employer] service: Not on file     Active member of club or organization: Not on file     Attends meetings of clubs or organizations: Not on file     Relationship status: Not on file     Intimate partner violence     Fear of current or ex partner: Not on file     Emotionally abused: Not on file     Physically abused: Not on file     Forced sexual activity: Not on file   Other Topics Concern     Parent/sibling w/ CABG, MI or angioplasty before  65F 55M? Not Asked   Social History Narrative     Not on file            Family History:     No family history on file.         Medications:     Current Outpatient Medications   Medication Sig     cetirizine (ZYRTEC) 10 MG tablet Take 10 mg by mouth daily     cholecalciferol 25 MCG (1000 UT) TABS Take 2,000 Units by mouth daily     diphenhydrAMINE (BENADRYL) 25 MG capsule Take 25 mg by mouth daily     doxepin (SINEQUAN) 10 MG capsule Take 10 mg by mouth daily     Ferrous Sulfate 324 MG TBEC Take 324 mg by mouth daily     gabapentin (NEURONTIN) 300 MG capsule Take 900 mg by mouth daily     hydrocortisone (CORTAID) 1 % external cream Apply topically daily     hydrOXYzine (VISTARIL) 25 MG capsule Take 25-50 mg by mouth as needed     ibuprofen (ADVIL/MOTRIN) 600 MG tablet Take 600 mg by mouth as needed     ketoconazole (NIZORAL) 2 % external cream Apply topically daily     lidocaine (XYLOCAINE) 5 % ointment Apply  topically daily. Apply a pea size amount to ulcer when changing the dressing on the wound.     lisinopril-hydrochlorothiazide (ZESTORETIC) 20-25 MG tablet Take 1 tablet by mouth once daily     naproxen sodium (ALEVE) 220 MG capsule Take 220 mg by mouth 2 times daily (with meals).     omeprazole (PRILOSEC) 20 MG DR capsule Take by mouth daily     oxyCODONE IR (ROXICODONE) 10 MG tablet daily     Specialty Vitamins Products (VITAMINS FOR HAIR) TABS Take 1 tablet by mouth     triamcinolone (KENALOG) 0.025 % cream      No current facility-administered medications for this visit.               Review of Systems:   A comprehensive 10 point review of systems (constitutional, ENT, cardiac, peripheral vascular, lymphatic, respiratory, GI, , Musculoskeletal, skin, Neurological) was performed and found to be negative except as described in this note.     See intake form completed by patient

## 2021-04-20 NOTE — LETTER
4/20/2021         RE: Casey Martinez  [address]  [facility] [address]        Dear Colleague,    Thank you for referring your patient, Casey Martinez, to the [facility] [place]. Please see a copy of my visit note below.        [facility]  Orthopaedic Surgery Consultation by Archie Ramos M.D.    Casey Martinez MRN# [id number]   Age: 65 year old YOB: 1955     Requesting physician: Referred Self  No primary care provider on file.     Background history:  DX:  1. Osteoarthritis left knee  2. Osteoarthritis right shoulder with significant arthritic changes of glenohumeral and AC joint.  Most likely presence of rotator cuff tear.  3. Recurrent DVT/PE: Advise Dr. Boateng: Do not use Xarelto.  Postoperative prophylactic enoxaparin or apixaban is advised.  4. Recurrent GI bleeding  5. Hepatitis C, no longer detectable  6. Pancreatitis  7. 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 3/3/2011, left total knee arthroplasty, Dr. Braden, [facility] [place] (Grace and Nephew Renée II total knee arthroplasty with a size 8 cemented cruciate-retaining femoral component, a size 8 cemented keeled tibial component, a 35-mm resurfacing polyethylene patella, and a 9-mm cruciate retaining tibial polyethylene insert)  2. 4/4/2021, I&D and DAIR  left total knee arthroplasty, Dr. Braden [facility] [place]  3. 6/9/2011, Removal of left total knee arthroplasty, I&D left total knee arthroplasty, Dr. Braden [facility] [place]  4. 6/30/2011, I&D left total knee arthroplasty, Dr. Braden [facility] [place]  5. 8/25/2011, revision left total knee arthroplasty, Dr. Braden [facility] [place] (Grace & Nephew Renée II Revision Knee system: Size 7 Constrained femur with a  mm stem and; Size 7 tibial tray with a 14 x 200mm stem; 11 mm PS poly insert)  6. 7/17/2017, removal of left total knee arthroplasty femoral component and placement of antibiotic spacer, Dr. Braden [facility] [place]. MSSA +  7. 7/20/2017, revision left total knee  [place]ro[place]. Br[place] [facility]  (Smith & Nephew Legion Revision Knee system: Size 6 revision femur with a  mm stem and 4 mm offset , 21 mm constrained poly insert.)               History of Present Illness:   65 year old male who was referred to our clinic by Dr. Sheehan because of loosenig of his stemmed femoral component with the question of revising the left total knee arthroplasty to a hinged prosthesis..  He has a complex medical history concerning his knee with his index knee was done in 2011, and he had a deep infection within a few months that was treated by a two-stage resection arthroplasty - reimplantation. He had obvious movement of his femoral stem which was pushing through the anterolateral femoral diaphysis. He is admitted for revision surgeryand had explantation of his femoral component. He had a temporary PMMA spacer inserted. Some, but not all, of his intraoperative cultures were growing MSSA, but his synovial fluid analysis was completely clear. Has has been seen by ID and it is felt that it is appropriate to proceed with re-implantation, with plans to continue his antibiotics.  On 7/20/2017 a highly constrained femoral component was placed.    Patient presents because of persistent and progressive pain of left knee.  He describes instability in both varus and valgus direction as well as in AP direction.  Pain is present continuously and worse during ambulation.  He needs assistive devices to move around.  All he can do is mobilize in and around the house.  Patient cannot fully extend his knee which he says has been present since 2017.     To complicate things further patient is having an ulcer on the medial side of left ankle.  She states he has had these in the past and they take approximately 6 months to heal.  He denies being diagnosed with diabetes.  He does smoke.  He has never had a vascular work-up done.    Social:   Occupation: not currently working  Living situation: lives  with his girlfriend  Hobbies / Sports: watching TV     Smoking: Yes  Alcohol: No  Illicit drug use: No         Physical Exam:     EXAMINATION pertinent findings:   PSYCH: Pleasant, healthy-appearing, alert, oriented x3, cooperative. Normal mood and affect.  VITAL SIGNS: There were no vitals taken for this visit.  Reviewed nursing intake notes.   There is no height or weight on file to calculate BMI.  RESP: non labored breathing   ABD: benign, soft, non-tender, no acute peritoneal findings  SKIN: grossly normal     MUSCULOSKELETAL:   Alignment: Neutral  Gait: Antalgic with shortened stance over left lower extremity.  Clear limp.  The left hip exhibits a full range of motion.     L knee: -30-0  .  Presence of extensor deficit.  Passively the knee can be brought to full extension.   No redness, warmth or skin changes present.  Wide but well-healed incision.  No clear palpable divot at suprapatellar pole.  Effusion Yes.  Grossly unstable knee and in both ML and AP direction.  PF tracking seems reasonable with dozens of some crepitus.     Left LE:   Thigh and leg compartments soft and compressible   +Quad/TA/GSC/FHL/EHL   SILT DP/SP/Rosalind/Saph/Tib nerve distributions   Dorsalis pedis pulse is not palpable.  Presence of ulcer medial ankle.                         Data:   All laboratory data reviewed    Aspiration results 2/4/2021:     Aerobic/anaerobic cultures no growth to date    Gram stain negative    Cell count differential  with 35% neutrophils        All imaging studies reviewed by me personally.    XR knee left 1/14/2021:  Status post revision left total knee arthroplasty.  Progressive loosening and subsidence of femoral component over time compared to previous imaging studies.  Fracturing of medial condyle remnant compared to immediate postoperative imaging studies.  Tibial component seems well fixed.  Presence of patella baja most likely due to insufficiency of quadriceps tendon.         Assessment  and Plan:   Assessment:  65-year-old male with past medical history notable for GI bleeds, recurrent DVT/PE and a multiply revised left total knee arthroplasty due to MSSA infection with progressive pain and instability due to loosening of femoral component.  Initial work-up does not seem consistent with recurrent infection however a high index of suspicion is present.  Besides the loosening of the femoral component whether this being septic or aseptic the extensor mechanism is compromised at the level of the quadriceps tendon.  Furthermore, patient is a smoker and has an ulcer on the medial side of ankle.      Plan:  I extensively discussed my findings with the patient.  There are multiple complicating factors in his past medical and surgical history to proceed with a 1 stage revision to hinged prosthesis.  First I am very concerned about a possible persistent infection in spite of the negative aspiration results.  Secondly, the vascular status of his left lower extremity seems suboptimal because of his medial ankle ulcer and would necessitate further evaluation and treatment before even considering revision surgery.  Third patient would need to stop smoking which he articulates is willing to do but would need help of his PCP.  Lastly I am concerned about the compromised extensor mechanism and patella baja.     It is my proposal to have patient evaluated by a vascular surgeon in order to optimize wound healing of his left lower extremity.  Furthermore I would like him to be evaluated by the infectious disease doctors to ascertain there is no other source of infection/seeding that explains the recurrent infections.  Tomorrow patient is seeing his primary care physician where he will discuss work-up for diabetes and smoking cessation.    From an orthopedic perspective instead of pursuing a 1 stage revision to rotating hinged prosthesis immediately it may be worth considering a two-stage revision with temporary static  spacer around T2 intramedullary nail.  During explantation and attempt to improve the extensor mechanism could be done and given the appropriate rest to heal well eradicating a potential infection.  I will discuss such an option with my colleagues as well.      We will follow-up with patient once he has been seen by vascular surgeon, PCP and infectious disease department.      Thank you for your referral.    Archie Ramos MD, PhD     Adult Reconstruction  [facility] Department of Orthopaedic Surgery  Pager [phone]      DATA for DOCUMENTATION:         Past Medical History:   There is no problem list on file for this patient.    No past medical history on file.    Also see scanned health assessment forms.       Past Surgical History:   No past surgical history on file.         Social History:     Social History     Socioeconomic History     Marital status:      Spouse name: Not on file     Number of children: Not on file     Years of education: Not on file     Highest education level: Not on file   Occupational History     Not on file   Social Needs     Financial resource strain: Not on file     Food insecurity     Worry: Not on file     Inability: Not on file     Transportation needs     Medical: Not on file     Non-medical: Not on file   Tobacco Use     Smoking status: Current Every Day Smoker     Smokeless tobacco: Never Used   Substance and Sexual Activity     Alcohol use: Never     Frequency: Never     Drug use: Never     Sexual activity: Not on file   Lifestyle     Physical activity     Days per week: Not on file     Minutes per session: Not on file     Stress: Not on file   Relationships     Social connections     Talks on phone: Not on file     Gets together: Not on file     Attends Congregational service: Not on file     Active member of club or organization: Not on file     Attends meetings of clubs or organizations: Not on file     Relationship status: Not on  file     Intimate partner violence     Fear of current or ex partner: Not on file     Emotionally abused: Not on file     Physically abused: Not on file     Forced sexual activity: Not on file   Other Topics Concern     Parent/sibling w/ CABG, MI or angioplasty before 65F 55M? Not Asked   Social History Narrative     Not on file            Family History:     No family history on file.         Medications:     Current Outpatient Medications   Medication Sig     cetirizine (ZYRTEC) 10 MG tablet Take 10 mg by mouth daily     cholecalciferol 25 MCG (1000 UT) TABS Take 2,000 Units by mouth daily     diphenhydrAMINE (BENADRYL) 25 MG capsule Take 25 mg by mouth daily     doxepin (SINEQUAN) 10 MG capsule Take 10 mg by mouth daily     Ferrous Sulfate 324 MG TBEC Take 324 mg by mouth daily     gabapentin (NEURONTIN) 300 MG capsule Take 900 mg by mouth daily     hydrocortisone (CORTAID) 1 % external cream Apply topically daily     hydrOXYzine (VISTARIL) 25 MG capsule Take 25-50 mg by mouth as needed     ibuprofen (ADVIL/MOTRIN) 600 MG tablet Take 600 mg by mouth as needed     ketoconazole (NIZORAL) 2 % external cream Apply topically daily     lidocaine (XYLOCAINE) 5 % ointment Apply  topically daily. Apply a pea size amount to ulcer when changing the dressing on the wound.     lisinopril-hydrochlorothiazide (ZESTORETIC) 20-25 MG tablet Take 1 tablet by mouth once daily     naproxen sodium (ALEVE) 220 MG capsule Take 220 mg by mouth 2 times daily (with meals).     omeprazole (PRILOSEC) 20 MG DR capsule Take by mouth daily     oxyCODONE IR (ROXICODONE) 10 MG tablet daily     Specialty Vitamins Products (VITAMINS FOR HAIR) TABS Take 1 tablet by mouth     triamcinolone (KENALOG) 0.025 % cream      No current facility-administered medications for this visit.               Review of Systems:   A comprehensive 10 point review of systems (constitutional, ENT, cardiac, peripheral vascular, lymphatic, respiratory, GI, ,  Musculoskeletal, skin, Neurological) was performed and found to be negative except as described in this note.     See intake form completed by patient          Again, thank you for allowing me to participate in the care of your patient.        Sincerely,        Archie Ramos MD

## 2021-04-27 ENCOUNTER — TELEPHONE (OUTPATIENT)
Dept: ORTHOPEDICS | Facility: CLINIC | Age: 66
End: 2021-04-27

## 2021-04-27 ENCOUNTER — TELEPHONE (OUTPATIENT)
Dept: VASCULAR SURGERY | Facility: CLINIC | Age: 66
End: 2021-04-27

## 2021-04-27 NOTE — TELEPHONE ENCOUNTER
Phone call to Julia, Vascular Surgery Referral. They have the order and it is in the queue to contact patient. She is not sure why he hasn't been contacted yet but will move it up to contact him.     Phone call to patient. He does not remember getting any paperwork from his appointment with Dr. Ramos. He states he has had so many appointments he is unable to keep track of the doctors.   He saw his PCP but it is unclear if he is getting worked up for diabetes or not. He was instructed to get some blood work done. Asked that he follow up with PCP regarding diabetes and smoking cessation.   Patient also informed of referrals to Infectious Disease and Vascular Surgeon. Phone numbers provided to patient to contact them. Patient informed that once he is done with those appointments he is to schedule an appointment with Dr. Ramos to follow up to discuss surgery. Our phone number provided. He verbalized understanding.     ESTEBAN Schroeder RN

## 2021-04-27 NOTE — TELEPHONE ENCOUNTER
Reason for call:  Other   Patient called regarding (reason for call): call back  Additional comments: Patient is requesting a call back to know what he should do for the next steps? Patient states he is supposed to schedule surgery can you help to see what he actually needs? what did Dr. Ramos want him to do as next steps?        Phone number to reach patient:  Home number on file [phone] (home)    Best Time:  anytime    Can we leave a detailed message on this number?  YES    Travel screening: Not Applicable     Brian Toledo on 4/27/2021 at 1:10 PM

## 2021-04-27 NOTE — TELEPHONE ENCOUNTER
Per Dr. Ramos's request. He would like patient to have a repeat left knee aspiration.  RN called this patient before but could never get ahold of patient and not able to leave patient message as his inbox is full.  RN notified Dr. Ramos and Dede, his new RN about this to follow up.  For context, this patient is actually a patient of Dr. Sheehan and Winifred has also attempted to call this patient but to no avail.    Leonor Tim RN

## 2021-04-27 NOTE — TELEPHONE ENCOUNTER
Referring providers name, location, phone number and fax: Archie Ramos MD in [facility]  Phone # [phone]     Reason for visit: Failed total knee replacement, initial encounter - Wound of left ankle, initial encounter     Does patient have a referral: Yes    Referral to specific provider? NA    Medical records or notes if possible: Records in [facility]   Please call patient back to schedule. Thank you

## 2021-04-29 ENCOUNTER — TELEPHONE (OUTPATIENT)
Dept: VASCULAR SURGERY | Facility: CLINIC | Age: 66
End: 2021-04-29

## 2021-04-29 NOTE — TELEPHONE ENCOUNTER
attemped twice to reach out to pt re: new pvd; ankle ulcer with no success.   VM box is full.    Will send letter to have pt reach out to us for an appt    Marisa GUTIERREZ RN, BSN  Interventional Radiology/Vascular  Nurse Coordinator   Phone: [phone]  Fax: [phone]

## 2021-04-30 NOTE — TELEPHONE ENCOUNTER
RECORDS RECEIVED FROM: Internal   DATE RECEIVED: 05.19.2021   NOTES (Gather within 2 years) STATUS DETAILS   OFFICE NOTE from referring provider   Internal 04.20.2021 Archie Ramos MD   OFFICE NOTE from other specialist Care Everywhere 07.09.2020 Hayder Cararnza MD      07.01.2020 Jo Naidu PA-C     DISCHARGE SUMMARY from hospital N/A    DISCHARGE REPORT from the ER N/A    LABS (any labs) Internal / CE    MEDICATION LIST Internal / CE    IMAGING  (NEED IMAGES AND REPORTS)     Osteomyelitis: Foot imaging  N/A    Liver Abscess: Abdominal imaging N/A    Other (anything related to diagnoses N/A

## 2021-05-03 DIAGNOSIS — S91.302A NON HEALING LEFT HEEL WOUND: Primary | ICD-10-CM

## 2021-05-03 DIAGNOSIS — I73.89 OTHER SPECIFIED PERIPHERAL VASCULAR DISEASES (H): ICD-10-CM

## 2021-05-03 NOTE — TELEPHONE ENCOUNTER
Patient referred for vascular work up for non healing wound on Left ankle. JUS's, bilateral arterial duplex and toe pressures ordered. Will send to [place] to schedule with Dr. Nagel ASAP.

## 2021-05-10 NOTE — TELEPHONE ENCOUNTER
DIAGNOSIS: referred by Dr. Ramos for a non healing wound on his L heel.   DATE RECEIVED: 5.11.21   NOTES STATUS DETAILS   OFFICE NOTE from referring provider Internal 4.20.21  Dr. Archie Ramos  [facility]   OFFICE NOTE from other specialist na    OPERATIVE REPORT na    MEDICATION LIST Internal    PERTINENT LABS Internal    CTA (CT ANGIOGRAPHY) na    CT na    MRI na    ULTRASOUND Internal *sched* for 5.11.21  US Low Ext Arterial Duplex Anibal  US Low Ext PPG  US JUS Doppler/no exer/Anibal    3.3.21  US Low Ext Venous Duplex Right

## 2021-05-11 ENCOUNTER — ANCILLARY PROCEDURE (OUTPATIENT)
Dept: ULTRASOUND IMAGING | Facility: CLINIC | Age: 66
End: 2021-05-11
Attending: SURGERY
Payer: COMMERCIAL

## 2021-05-11 ENCOUNTER — PRE VISIT (OUTPATIENT)
Dept: VASCULAR SURGERY | Facility: CLINIC | Age: 66
End: 2021-05-11

## 2021-05-11 ENCOUNTER — OFFICE VISIT (OUTPATIENT)
Dept: VASCULAR SURGERY | Facility: CLINIC | Age: 66
End: 2021-05-11
Payer: COMMERCIAL

## 2021-05-11 VITALS
RESPIRATION RATE: 16 BRPM | TEMPERATURE: 98 F | OXYGEN SATURATION: 99 % | HEART RATE: 63 BPM | DIASTOLIC BLOOD PRESSURE: 84 MMHG | SYSTOLIC BLOOD PRESSURE: 121 MMHG

## 2021-05-11 DIAGNOSIS — S91.302A NON HEALING LEFT HEEL WOUND: ICD-10-CM

## 2021-05-11 DIAGNOSIS — I73.89 OTHER SPECIFIED PERIPHERAL VASCULAR DISEASES (H): ICD-10-CM

## 2021-05-11 DIAGNOSIS — Z96.659 FAILED TOTAL KNEE REPLACEMENT, INITIAL ENCOUNTER (H): ICD-10-CM

## 2021-05-11 DIAGNOSIS — L97.922 ULCER OF LEFT LOWER EXTREMITY WITH FAT LAYER EXPOSED (H): ICD-10-CM

## 2021-05-11 DIAGNOSIS — T84.018A FAILED TOTAL KNEE REPLACEMENT, INITIAL ENCOUNTER (H): ICD-10-CM

## 2021-05-11 DIAGNOSIS — S91.002A WOUND OF LEFT ANKLE, INITIAL ENCOUNTER: ICD-10-CM

## 2021-05-11 PROCEDURE — 93922 UPR/L XTREMITY ART 2 LEVELS: CPT | Mod: GC | Performed by: RADIOLOGY

## 2021-05-11 PROCEDURE — 99203 OFFICE O/P NEW LOW 30 MIN: CPT | Performed by: SURGERY

## 2021-05-11 PROCEDURE — 93925 LOWER EXTREMITY STUDY: CPT | Mod: GC | Performed by: RADIOLOGY

## 2021-05-11 PROCEDURE — 99207 US LOWER EXTREMITY PPG: CPT | Mod: GC | Performed by: RADIOLOGY

## 2021-05-11 RX ORDER — MIRTAZAPINE 30 MG/1
30 TABLET, FILM COATED ORAL AT BEDTIME
COMMUNITY
Start: 2021-05-03

## 2021-05-11 RX ORDER — FERROUS SULFATE 325(65) MG
648 TABLET ORAL DAILY
COMMUNITY
Start: 2021-05-03 | End: 2021-07-28

## 2021-05-11 RX ORDER — GABAPENTIN 300 MG/1
900 CAPSULE ORAL
Status: ON HOLD | COMMUNITY
Start: 2021-03-08 | End: 2021-07-30

## 2021-05-11 RX ORDER — CYCLOBENZAPRINE HCL 10 MG
10 TABLET ORAL 3 TIMES DAILY PRN
COMMUNITY
Start: 2021-05-03

## 2021-05-11 RX ORDER — LISINOPRIL AND HYDROCHLOROTHIAZIDE 20; 25 MG/1; MG/1
1 TABLET ORAL
COMMUNITY
Start: 2021-03-17 | End: 2021-07-28

## 2021-05-11 RX ORDER — OXYCODONE HYDROCHLORIDE 10 MG/1
5 TABLET ORAL EVERY 4 HOURS PRN
COMMUNITY
Start: 2021-05-10

## 2021-05-11 RX ORDER — DOXEPIN HYDROCHLORIDE 50 MG/1
50 CAPSULE ORAL AT BEDTIME
COMMUNITY
Start: 2021-04-27

## 2021-05-11 RX ORDER — ACETAMINOPHEN 500 MG
1000 TABLET ORAL EVERY 6 HOURS PRN
COMMUNITY
Start: 2021-02-24

## 2021-05-11 ASSESSMENT — PATIENT HEALTH QUESTIONNAIRE - PHQ9: SUM OF ALL RESPONSES TO PHQ QUESTIONS 1-9: 17

## 2021-05-11 NOTE — LETTER
5/11/2021       RE: Casey Martinez  [address]  [facility] [address]     Dear Colleague,    Thank you for referring your patient, Casey Martinez, to the [facility] [place] at [facility]. Please see a copy of my visit note below.    Assessment & Plan   Problem List Items Addressed This Visit     None      Visit Diagnoses     Failed total knee replacement, initial encounter (H)        Relevant Medications    cyclobenzaprine (FLEXERIL) 10 MG tablet    acetaminophen (TYLENOL) 500 MG tablet    oxyCODONE IR (ROXICODONE) 10 MG tablet    Other Relevant Orders    Orthopedic & Spine  Referral    Wound of left ankle, initial encounter        Relevant Medications    cyclobenzaprine (FLEXERIL) 10 MG tablet    acetaminophen (TYLENOL) 500 MG tablet    oxyCODONE IR (ROXICODONE) 10 MG tablet    Other Relevant Orders    Orthopedic & Spine  Referral         Mr. Martinez is a 65yr old male with history of multiple left knee surgeries now with recurring wounds to left foot.     - Normal arterial blood supply to the foot so this is not the source of the wounds. He has adequate and normal flow to heal any wounds. He has no signs or symptoms of venous insufficiency either.   - Unclear what the source of the wounds are. I will place a referral to podiatry to evaluate. He has symptoms consistent with neuropathy in the foot but is insistent there is no rubbing on his shoes.     Review of prior external note(s) from - Outside records [place] [facility]  Review of the result(s) of each unique test - ABIs, toe pressure, bilateral lower extremity arterial duplex  Independent interpretation of a test performed by another physician/other qualified health care professional (not separately reported) - ABIs and toe pressures normal. Duplex normal. Concern for possible chronic thrombus in right popliteal vein.   Discussion of management or test  interpretation with external physician/other qualified healthcare professional/appropriate source - Will refer my note to PCP and orthopedic surgeon  30 minutes spent on the date of the encounter doing chart review, history and exam, documentation and further activities per the note    Hilda Nagel MD      [facility] [place] Peña is a 65 year old who presents for the following health issues    HPI   Mr. Martinez was referred for left foot wounds. He says they pop up randomly. He has some smaller wounds on the lateral foot which is how they start. They then get larger and eventually heal with wound care. He is frustrated by this because he doesn't understand why they keep happening. He denies symptoms of claudication or rest pain. He has instability of the left knee from multiple surgeries and needs another surgery reportedly. He says the left foot also feels like it is on fire and doesn't have normal sensation like the other foot. He thought this was from decreased blood flow to the foot but I explained he has normal blood flow to the foot. He has no history of leg swelling on either side and no skin changes consistent with venous insufficiency either. I asked if he had ever had a blood clot in the vein behind the knee on the right. He said no. He denies any history of swelling in either leg.     Objective    /84 (BP Location: Left arm, Patient Position: Sitting, Cuff Size: Adult Regular)   Pulse 63   Temp 98  F (36.7  C)   Resp 16   SpO2 99%   There is no height or weight on file to calculate BMI.  Physical Exam   GENERAL: healthy, alert and no distress  RESP: No increased work of breathing  CV: regular rate, bilateral DP pulses  MS: no gross musculoskeletal defects noted, no edema  SKIN: Left medial foot open wound and small wound on lateral foot. No drainage or foul smell. No concern for infection. No skin changes in the calves or shins consistent with venous  insufficiency.   NEURO: Normal strength and tone, mentation intact and speech normal  PSYCH: mentation appears normal, seems frustrated    Us Lower Extremity Arterial Duplex Bilateral    Result Date: 5/11/2021  Exam: Duplex ultrasound of bilateral lower extremity arteries dated 5/11/2021 10:39 AM Clinical information: Non healing left heel wound; Other specified peripheral vascular diseases (H) Comparison: Right lower extremity venous ultrasound on 3/3/2021 Technique: Grayscale (B-mode), color Doppler, and duplex spectral Doppler ultrasound of the lower extremity arteries. Velocity measurements obtained with angle correction of 60 degrees or less. Ordering provider: ASHLEY TREVIZO Findings: Right lower extremity: EIA: Velocity: 93 cm/sec.  Waveforms: Triphasic Common femoral artery: Velocity: 56 cm/s cm/sec. Waveforms: Triphasic Profunda femoral artery: Velocity: 60 cm/s cm/sec. Waveforms: Triphasic Proximal SFA: Velocity: 97 cm/s cm/sec. Waveforms: Triphasic Mid SFA:Velocity:  80 cm/s cm/sec. Waveforms: Triphasic Distal SFA: Velocity: 62 cm/s cm/sec. Waveforms: Triphasic Popliteal artery, proximal: Velocity: 52 cm/s cm/sec. Waveforms: Triphasic Popliteal artery, distal: Velocity:  81 cm/sec. Waveforms: Monophasic PTA ankle: Velocity: 140 cm/s cm/sec. Waveforms: Monophasic AIMEE ankle: Velocity: 69 cm/s cm/sec. Waveforms: Monophasic Partially compressible, nonocclusive deep venous thrombosis within the distal femoral vein and proximal popliteal vein. Left lower extremity: EIA: Velocity: 109 cm/s cm/sec.  Waveforms: Monophasic Common femoral artery: Velocity: 71 cm/s cm/sec. Waveforms: Triphasic Deep femoral artery: Velocity: 48 cm/s cm/sec. Waveforms: Triphasic Proximal SFA: Velocity: 85 cm/s cm/sec. Waveforms: Monophasic Mid SFA: Velocity: 102 cm/s cm/sec. Waveforms: Triphasic Distal SFA: Velocity: 88 cm/sec. Waveforms: Monophasic Popliteal artery, proximal: Velocity: 60 cm/s cm/sec. Waveforms: Monophasic Popliteal  artery, distal: Velocity:  93 cm/sec. Waveforms: Monophasic PTA ankle: Velocity: 109 cm/s cm/sec. Waveforms: Monophasic AIMEE ankle: Velocity: 87 cm/s cm/sec. Waveforms: Monophasic Prominent hypoechoic lymph node with normal fatty hilum in the left groin measuring 5.2 x 4.1 x 1.5 cm.     Impression: 1. Right leg: Patent Doppler evaluation of the right lower extremity arterial system without sonographic evidence of stenosis.  2. Left leg: Patent Doppler evaluation of the left lower extremity arterial system without sonographic evidence of stenosis. 3. Chronic nonocclusive deep venous thrombosis within the distal right femoral vein and proximal popliteal vein. Guidelines: University [facility] duplex criteria for lower limb arterial occlusive disease Percent stenosis: Normal (1-19%): Peak systolic velocity (cm/s): <150, End-diastolic velocity (cm/s): <40, Velocity ratio (Vr): <1.5, Distal arterial waveform: Triphasic 20-49%: Peak systolic velocity (cm/s): 150-200, End-diastolic velocity (cm/s): <40, Velocity ratio (Vr): 1.5-2.0, Distal arterial waveform: Triphasic 50-75%: Peak systolic velocity (cm/s): 200-300, End-diastolic velocity (cm/s): <90, Velocity ratio (Vr): 2.0-3.9, Distal arterial waveform: Poststenotic turbulence distal to stenosis, monophasic distal waveform >75%: Peak systolic velocity (cm/s): >300, End-diastolic velocity (cm/s): <90, Velocity ratio (Vr): >4.0, Distal arterial waveform: Dampened distal waveform and low PSV/EDV* in the stenosis Occlusion: Absent flow by color Doppler/pulsed Doppler spectral analysis; length of occlusion estimated from distance between exit and reentry collateral arteries *PSV = peak systolic velocity, EDV = end-diastolic velocity http://[email or web address]/[email or web address]/[email or web address]/fulltext html I have personally reviewed the examination and initial interpretation and I agree with the findings. ALONDRA DAVALOS    Us Isabella Doppler No Exercise    Result Date:  5/11/2021  Exam: Bilateral lower extremity resting ankle brachial indices dated 5/11/2021 10:45 AM Comparison study: None Clinical history: Non healing left heel wound; Other specified peripheral vascular diseases (H) Ordering provider: ASHLEY TREVIZO Technique: Bilateral lower extremity resting ankle brachial indices obtained. Toe PPG waveforms were obtained with a sensor and infrared light. Findings: Right:  Arm: 160 mmHg  PT at ankle: 203 mmHg  DP at foot: 200 mmHg  Toe pressure 126 mmHg  JUS: 1.20  TBI: 0.75 Right foot PPG waveforms: First toe: Present, Normal Second toe: Present, Normal Third toe: Present, Normal Fourth toe: Present, Mildly abnormal Fifth toe: Present, Mildly abnormal Left:  Arm: 169 mmHg  PT at ankle: 183 mmHg  DP at foot: 191 mmHg  Toe pressure 173 mmHg  JUS: 1.13  TBI: 1.02 Left foot PPG waveforms: First toe: Present, Normal Second toe: Present, Normal Third toe: Present, Normal Fourth toe: Present, Mildly abnormal Fifth toe: Present, Normal     Impression: Right leg: Resting JUS is 1.20. Normal. TBI is 0.75. Left leg: Resting JUS is 1.13. Normal. TBI is 1.02. Minimally dampened waveforms in the right fourth and fifth digits and the left fourth digit. The remainder are within normal limits. Guidelines: JUS Diagnostic Criteria (Based on criteria published in Circulation 2011; 124: 4573-8375):   > 1.4: Non compressible   1.00 - 1.40: Normal   0.91 - 0.99: Borderline   At or below 0.90: Abnormal JUS Diagnostic Criteria (Based on ACC/AHA guideline 2008):   >/=1.3 - non compressible vessels   1.00  -1.29 - Normal   0.91 - 0.99 - Borderline   0.41 - 0.90 - Mild to moderate PAD   0.00 - 0.40 - Severe PAD I have personally reviewed the examination and initial interpretation and I agree with the findings. ALONDRA Yousif Ppg-lower (vascular Lab)    Result Date: 5/11/2021  Exam: Bilateral lower extremity resting ankle brachial indices dated 5/11/2021 10:45 AM Comparison study: None Clinical  history: Non healing left heel wound; Other specified peripheral vascular diseases (H) Ordering provider: ASHLEY NAGEL Technique: Bilateral lower extremity resting ankle brachial indices obtained. Toe PPG waveforms were obtained with a sensor and infrared light. Findings: Right:  Arm: 160 mmHg  PT at ankle: 203 mmHg  DP at foot: 200 mmHg  Toe pressure 126 mmHg  JUS: 1.20  TBI: 0.75 Right foot PPG waveforms: First toe: Present, Normal Second toe: Present, Normal Third toe: Present, Normal Fourth toe: Present, Mildly abnormal Fifth toe: Present, Mildly abnormal Left:  Arm: 169 mmHg  PT at ankle: 183 mmHg  DP at foot: 191 mmHg  Toe pressure 173 mmHg  JUS: 1.13  TBI: 1.02 Left foot PPG waveforms: First toe: Present, Normal Second toe: Present, Normal Third toe: Present, Normal Fourth toe: Present, Mildly abnormal Fifth toe: Present, Normal     Impression: Right leg: Resting JUS is 1.20. Normal. TBI is 0.75. Left leg: Resting JUS is 1.13. Normal. TBI is 1.02. Minimally dampened waveforms in the right fourth and fifth digits and the left fourth digit. The remainder are within normal limits. Guidelines: JUS Diagnostic Criteria (Based on criteria published in Circulation 2011; 124: 2164-5732):   > 1.4: Non compressible   1.00 - 1.40: Normal   0.91 - 0.99: Borderline   At or below 0.90: Abnormal JUS Diagnostic Criteria (Based on ACC/AHA guideline 2008):   >/=1.3 - non compressible vessels   1.00  -1.29 - Normal   0.91 - 0.99 - Borderline   0.41 - 0.90 - Mild to moderate PAD   0.00 - 0.40 - Severe PAD I have personally reviewed the examination and initial interpretation and I agree with the findings. ALONDRA DAVALOS    Again, thank you for allowing me to participate in the care of your patient.      Sincerely,    Ashley Nagel MD

## 2021-05-11 NOTE — PROGRESS NOTES
Assessment & Plan   Problem List Items Addressed This Visit     None      Visit Diagnoses     Failed total knee replacement, initial encounter (H)        Relevant Medications    cyclobenzaprine (FLEXERIL) 10 MG tablet    acetaminophen (TYLENOL) 500 MG tablet    oxyCODONE IR (ROXICODONE) 10 MG tablet    Other Relevant Orders    Orthopedic & Spine  Referral    Wound of left ankle, initial encounter        Relevant Medications    cyclobenzaprine (FLEXERIL) 10 MG tablet    acetaminophen (TYLENOL) 500 MG tablet    oxyCODONE IR (ROXICODONE) 10 MG tablet    Other Relevant Orders    Orthopedic & Spine  Referral         Mr. Martinez is a 65yr old male with history of multiple left knee surgeries now with recurring wounds to left foot.     - Normal arterial blood supply to the foot so this is not the source of the wounds. He has adequate and normal flow to heal any wounds. He has no signs or symptoms of venous insufficiency either.   - Unclear what the source of the wounds are. I will place a referral to podiatry to evaluate. He has symptoms consistent with neuropathy in the foot but is insistent there is no rubbing on his shoes.     Review of prior external note(s) from - [place] [facility] [place]  Review of the result(s) of each unique test - ABIs, toe pressure, bilateral lower extremity arterial duplex  Independent interpretation of a test performed by another physician/other qualified health care professional (not separately reported) - ABIs and toe pressures normal. Duplex normal. Concern for possible chronic thrombus in right popliteal vein.   Discussion of management or test interpretation with external physician/other qualified healthcare professional/appropriate source - Will refer my note to PCP and orthopedic surgeon  30 minutes spent on the date of the encounter doing chart review, history and exam, documentation and further activities per the note    Hilda Nagel MD      [facility]  [place] [facility] TAQU[place]IA Peña is a 65 year old who presents for the following health issues    HPI   Mr. Martinez was referred for left foot wounds. He says they pop up randomly. He has some smaller wounds on the lateral foot which is how they start. They then get larger and eventually heal with wound care. He is frustrated by this because he doesn't understand why they keep happening. He denies symptoms of claudication or rest pain. He has instability of the left knee from multiple surgeries and needs another surgery reportedly. He says the left foot also feels like it is on fire and doesn't have normal sensation like the other foot. He thought this was from decreased blood flow to the foot but I explained he has normal blood flow to the foot. He has no history of leg swelling on either side and no skin changes consistent with venous insufficiency either. I asked if he had ever had a blood clot in the vein behind the knee on the right. He said no. He denies any history of swelling in either leg.         Objective    /84 (BP Location: Left arm, Patient Position: Sitting, Cuff Size: Adult Regular)   Pulse 63   Temp 98  F (36.7  C)   Resp 16   SpO2 99%   There is no height or weight on file to calculate BMI.  Physical Exam   GENERAL: healthy, alert and no distress  RESP: No increased work of breathing  CV: regular rate, bilateral DP pulses  MS: no gross musculoskeletal defects noted, no edema  SKIN: Left medial foot open wound and small wound on lateral foot. No drainage or foul smell. No concern for infection. No skin changes in the calves or shins consistent with venous insufficiency.   NEURO: Normal strength and tone, mentation intact and speech normal  PSYCH: mentation appears normal, seems frustrated    Us Lower Extremity Arterial Duplex Bilateral    Result Date: 5/11/2021  Exam: Duplex ultrasound of bilateral lower extremity arteries dated 5/11/2021 10:39 AM Clinical information:  Non healing left heel wound; Other specified peripheral vascular diseases (H) Comparison: Right lower extremity venous ultrasound on 3/3/2021 Technique: Grayscale (B-mode), color Doppler, and duplex spectral Doppler ultrasound of the lower extremity arteries. Velocity measurements obtained with angle correction of 60 degrees or less. Ordering provider: ASHLEY TREVIZO Findings: Right lower extremity: EIA: Velocity: 93 cm/sec.  Waveforms: Triphasic Common femoral artery: Velocity: 56 cm/s cm/sec. Waveforms: Triphasic Profunda femoral artery: Velocity: 60 cm/s cm/sec. Waveforms: Triphasic Proximal SFA: Velocity: 97 cm/s cm/sec. Waveforms: Triphasic Mid SFA:Velocity:  80 cm/s cm/sec. Waveforms: Triphasic Distal SFA: Velocity: 62 cm/s cm/sec. Waveforms: Triphasic Popliteal artery, proximal: Velocity: 52 cm/s cm/sec. Waveforms: Triphasic Popliteal artery, distal: Velocity:  81 cm/sec. Waveforms: Monophasic PTA ankle: Velocity: 140 cm/s cm/sec. Waveforms: Monophasic AIMEE ankle: Velocity: 69 cm/s cm/sec. Waveforms: Monophasic Partially compressible, nonocclusive deep venous thrombosis within the distal femoral vein and proximal popliteal vein. Left lower extremity: EIA: Velocity: 109 cm/s cm/sec.  Waveforms: Monophasic Common femoral artery: Velocity: 71 cm/s cm/sec. Waveforms: Triphasic Deep femoral artery: Velocity: 48 cm/s cm/sec. Waveforms: Triphasic Proximal SFA: Velocity: 85 cm/s cm/sec. Waveforms: Monophasic Mid SFA: Velocity: 102 cm/s cm/sec. Waveforms: Triphasic Distal SFA: Velocity: 88 cm/sec. Waveforms: Monophasic Popliteal artery, proximal: Velocity: 60 cm/s cm/sec. Waveforms: Monophasic Popliteal artery, distal: Velocity:  93 cm/sec. Waveforms: Monophasic PTA ankle: Velocity: 109 cm/s cm/sec. Waveforms: Monophasic AIMEE ankle: Velocity: 87 cm/s cm/sec. Waveforms: Monophasic Prominent hypoechoic lymph node with normal fatty hilum in the left groin measuring 5.2 x 4.1 x 1.5 cm.     Impression: 1. Right leg: Patent  Doppler evaluation of the right lower extremity arterial system without sonographic evidence of stenosis.  2. Left leg: Patent Doppler evaluation of the left lower extremity arterial system without sonographic evidence of stenosis. 3. Chronic nonocclusive deep venous thrombosis within the distal right femoral vein and proximal popliteal vein. Guidelines: [facility] duplex criteria for lower limb arterial occlusive disease Percent stenosis: Normal (1-19%): Peak systolic velocity (cm/s): <150, End-diastolic velocity (cm/s): <40, Velocity ratio (Vr): <1.5, Distal arterial waveform: Triphasic 20-49%: Peak systolic velocity (cm/s): 150-200, End-diastolic velocity (cm/s): <40, Velocity ratio (Vr): 1.5-2.0, Distal arterial waveform: Triphasic 50-75%: Peak systolic velocity (cm/s): 200-300, End-diastolic velocity (cm/s): <90, Velocity ratio (Vr): 2.0-3.9, Distal arterial waveform: Poststenotic turbulence distal to stenosis, monophasic distal waveform >75%: Peak systolic velocity (cm/s): >300, End-diastolic velocity (cm/s): <90, Velocity ratio (Vr): >4.0, Distal arterial waveform: Dampened distal waveform and low PSV/EDV* in the stenosis Occlusion: Absent flow by color Doppler/pulsed Doppler spectral analysis; length of occlusion estimated from distance between exit and reentry collateral arteries *PSV = peak systolic velocity, EDV = end-diastolic velocity http://[email or web address]/[email or web address]/[email or web address]/fulltext html I have personally reviewed the examination and initial interpretation and I agree with the findings. ALONDRA DAVALOS    Us Isabella Doppler No Exercise    Result Date: 5/11/2021  Exam: Bilateral lower extremity resting ankle brachial indices dated 5/11/2021 10:45 AM Comparison study: None Clinical history: Non healing left heel wound; Other specified peripheral vascular diseases (H) Ordering provider: ASHLEY TREVIZO Technique: Bilateral lower extremity resting ankle brachial indices  obtained. Toe PPG waveforms were obtained with a sensor and infrared light. Findings: Right:  Arm: 160 mmHg  PT at ankle: 203 mmHg  DP at foot: 200 mmHg  Toe pressure 126 mmHg  JUS: 1.20  TBI: 0.75 Right foot PPG waveforms: First toe: Present, Normal Second toe: Present, Normal Third toe: Present, Normal Fourth toe: Present, Mildly abnormal Fifth toe: Present, Mildly abnormal Left:  Arm: 169 mmHg  PT at ankle: 183 mmHg  DP at foot: 191 mmHg  Toe pressure 173 mmHg  JUS: 1.13  TBI: 1.02 Left foot PPG waveforms: First toe: Present, Normal Second toe: Present, Normal Third toe: Present, Normal Fourth toe: Present, Mildly abnormal Fifth toe: Present, Normal     Impression: Right leg: Resting JUS is 1.20. Normal. TBI is 0.75. Left leg: Resting JUS is 1.13. Normal. TBI is 1.02. Minimally dampened waveforms in the right fourth and fifth digits and the left fourth digit. The remainder are within normal limits. Guidelines: JUS Diagnostic Criteria (Based on criteria published in Circulation 2011; 124: 4044-6529):   > 1.4: Non compressible   1.00 - 1.40: Normal   0.91 - 0.99: Borderline   At or below 0.90: Abnormal JUS Diagnostic Criteria (Based on ACC/AHA guideline 2008):   >/=1.3 - non compressible vessels   1.00  -1.29 - Normal   0.91 - 0.99 - Borderline   0.41 - 0.90 - Mild to moderate PAD   0.00 - 0.40 - Severe PAD I have personally reviewed the examination and initial interpretation and I agree with the findings. ALONDRA DAVALOS     Ppg-lower (vascular Lab)    Result Date: 5/11/2021  Exam: Bilateral lower extremity resting ankle brachial indices dated 5/11/2021 10:45 AM Comparison study: None Clinical history: Non healing left heel wound; Other specified peripheral vascular diseases (H) Ordering provider: ASHLEY TREVIZO Technique: Bilateral lower extremity resting ankle brachial indices obtained. Toe PPG waveforms were obtained with a sensor and infrared light. Findings: Right:  Arm: 160 mmHg  PT at ankle: 203 mmHg  DP  at foot: 200 mmHg  Toe pressure 126 mmHg  JUS: 1.20  TBI: 0.75 Right foot PPG waveforms: First toe: Present, Normal Second toe: Present, Normal Third toe: Present, Normal Fourth toe: Present, Mildly abnormal Fifth toe: Present, Mildly abnormal Left:  Arm: 169 mmHg  PT at ankle: 183 mmHg  DP at foot: 191 mmHg  Toe pressure 173 mmHg  JUS: 1.13  TBI: 1.02 Left foot PPG waveforms: First toe: Present, Normal Second toe: Present, Normal Third toe: Present, Normal Fourth toe: Present, Mildly abnormal Fifth toe: Present, Normal     Impression: Right leg: Resting UJS is 1.20. Normal. TBI is 0.75. Left leg: Resting JUS is 1.13. Normal. TBI is 1.02. Minimally dampened waveforms in the right fourth and fifth digits and the left fourth digit. The remainder are within normal limits. Guidelines: JUS Diagnostic Criteria (Based on criteria published in Circulation 2011; 124: 1441-6673):   > 1.4: Non compressible   1.00 - 1.40: Normal   0.91 - 0.99: Borderline   At or below 0.90: Abnormal JUS Diagnostic Criteria (Based on ACC/AHA guideline 2008):   >/=1.3 - non compressible vessels   1.00  -1.29 - Normal   0.91 - 0.99 - Borderline   0.41 - 0.90 - Mild to moderate PAD   0.00 - 0.40 - Severe PAD I have personally reviewed the examination and initial interpretation and I agree with the findings. ALONDRA DAVALOS

## 2021-05-13 NOTE — PROGRESS NOTES
[facility]  Orthopaedic Surgery Consultation by Archie Ramos M.D.    Casey Martinez MRN# [id number]   Age: 65 year old YOB: 1955     Requesting physician: Referred Self  No primary care provider on file.     Background history:  DX:  1. Osteoarthritis left knee  2. Osteoarthritis right shoulder with significant arthritic changes of glenohumeral and AC joint.  Most likely presence of rotator cuff tear.  3. Recurrent DVT/PE: Advise Dr. Boateng: Do not use Xarelto.  Postoperative prophylactic enoxaparin or apixaban is advised.  4. Recurrent GI bleeding  5. Hepatitis C, no longer detectable  6. Pancreatitis  7. 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 3/3/2011, left total knee arthroplasty, Dr. Braden, [facility] [place] (Grace and Nephew Renée II total knee arthroplasty with a size 8 cemented cruciate-retaining femoral component, a size 8 cemented keeled tibial component, a 35-mm resurfacing polyethylene patella, and a 9-mm cruciate retaining tibial polyethylene insert)  2. 4/4/2021, I&D and DAIR  left total knee arthroplasty, Dr. Braden, [facility] [place]  3. 6/9/2011, Removal of left total knee arthroplasty, I&D left total knee arthroplasty, Dr. Braden, [facility] [place]  4. 6/30/2011, I&D left total knee arthroplasty, Dr. Braden, [facility] [place]  5. 8/25/2011, revision left total knee arthroplasty, Dr. Braden, [facility] [place] (Grace & Nephew Renée II Revision Knee system: Size 7 Constrained femur with a  mm stem and; Size 7 tibial tray with a 14 x 200mm stem; 11 mm PS poly insert)  6. 7/17/2017, removal of left total knee arthroplasty femoral component and placement of antibiotic spacer, Dr. Braden, [facility] [place]. MSSA +  7. 7/20/2017, revision left total knee arthroplasty, Dr. Braden [facility] [place].  (Smith & Nephew Legion Revision Knee system: Size 6 revision femur with a  mm stem and 4 mm offset , 21 mm constrained poly insert.)               History of Present Illness:     65-year-old male with past medical history  notable for GI bleeds, recurrent DVT/PE and a multiply revised left total knee arthroplasty due to MSSA infection with progressive pain and instability due to loosening of femoral component.  Initial work-up does not seem consistent with recurrent infection however a high index of suspicion is present.  Besides the loosening of the femoral component whether this being septic or aseptic the extensor mechanism is compromised at the level of the quadriceps tendon.  Furthermore, patient is a smoker and has an ulcer on the medial side of ankle.      65 year old male who was referred to our clinic by Dr. Sheehan because of loosening of his stemmed femoral component with the question of revising the left total knee arthroplasty to a hinged prosthesis.  He has a complex medical history concerning his knee with his index knee was done in 2011, and he had a deep infection within a few months that was treated by a two-stage resection arthroplasty - reimplantation. He had obvious movement of his femoral stem which was pushing through the anterolateral femoral diaphysis. He is admitted for revision surgeryand had explantation of his femoral component. He had a temporary PMMA spacer inserted. Some, but not all, of his intraoperative cultures were growing MSSA, but his synovial fluid analysis was completely clear. Has has been seen by ID and it is felt that it is appropriate to proceed with re-implantation, with plans to continue his antibiotics.  On 7/20/2017 a highly constrained femoral component was placed.    Patient presents because of persistent and progressive pain of left knee.  He describes instability in both varus and valgus direction as well as in AP direction.  Pain is present continuously and worse during ambulation.  He needs assistive devices to move around.  All he can do is mobilize in and around the house.  Patient cannot fully extend his knee which he says has been present since 2017.     To complicate things  further patient is having an ulcer on the medial side of left ankle.  She states he has had these in the past and they take approximately 6 months to heal.  He denies being diagnosed with diabetes.  He does smoke.  He has never had a vascular work-up done.    Social:   Occupation: not currently working  Living situation: lives with his girlfriend  Hobbies / Sports: watching TV     Smoking: Yes  Alcohol: No  Illicit drug use: No         Physical Exam:     EXAMINATION pertinent findings:   PSYCH: Pleasant, healthy-appearing, alert, oriented x3, cooperative. Normal mood and affect.  VITAL SIGNS: There were no vitals taken for this visit.  Reviewed nursing intake notes.   There is no height or weight on file to calculate BMI.  RESP: non labored breathing   ABD: benign, soft, non-tender, no acute peritoneal findings  SKIN: grossly normal     MUSCULOSKELETAL:   Alignment: Neutral  Gait: Antalgic with shortened stance over left lower extremity.  Clear limp.  The left hip exhibits a full range of motion.     L knee: -30-0  .  Presence of extensor deficit.  Passively the knee can be brought to full extension.   No redness, warmth or skin changes present.  Wide but well-healed incision.  No clear palpable divot at suprapatellar pole.  Effusion Yes.  Grossly unstable knee and in both ML and AP direction.  PF tracking seems reasonable with dozens of some crepitus.     Left LE:   Thigh and leg compartments soft and compressible   +Quad/TA/GSC/FHL/EHL   SILT DP/SP/Rosalind/Saph/Tib nerve distributions   Dorsalis pedis pulse is not palpable.  Presence of ulcer medial ankle.                         Data:   All laboratory data reviewed    Aspiration results 2/4/2021:     Aerobic/anaerobic cultures no growth to date    Gram stain negative    Cell count differential  with 35% neutrophils    No Synovasure testing available.    All imaging studies reviewed by me personally.    XR knee left 1/14/2021:  Status post revision left  total knee arthroplasty.  Progressive loosening and subsidence of femoral component over time compared to previous imaging studies.  Fracturing of medial condyle remnant compared to immediate postoperative imaging studies.  Tibial component seems well fixed.  Presence of patella baja most likely due to insufficiency of quadriceps tendon.         Assessment and Plan:   Assessment:  65-year-old male with past medical history notable for GI bleeds, recurrent DVT/PE and a multiply revised left total knee arthroplasty due to MSSA infection with progressive pain and instability due to loosening of femoral component.  Initial work-up does not seem consistent with recurrent infection however a high index of suspicion is present.  Besides the loosening of the femoral component whether this being septic or aseptic the extensor mechanism is compromised at the level of the quadriceps tendon.  Furthermore, patient is a smoker and has an ulcer on the medial side of ankle.      Plan:  I once more extensively discussed my findings with the patient.  There are multiple complicating factors in his past medical and surgical history to proceed with a 1 stage revision to hinged prosthesis.  I conferred with my arthroplasty colleagues and the consensus is to obtain a renewed aspiration of left knee and send specimen also for Synovasure testing.  Today after obtaining informed consent a repeat aspiration of the knee was performed.  10 cc of serosanguineous synovial fluid was obtained.    Furthermore, the vascular status of his left lower extremity seems suboptimal because of his medial ankle ulcer and now his new lateral ulcer and would necessitate further evaluation and treatment before even considering revision surgery.    Patient would need to stop smoking which he articulates is willing to do.    Lastly I am concerned about the compromised extensor mechanism and patella baja.     I repeated my proposal to have patient evaluated by a  vascular surgeon in order to optimize wound healing of his left lower extremity.  Furthermore I would like him to be evaluated by the infectious disease doctors to ascertain there is no other source of infection/seeding that explains the recurrent infections.  Tomorrow patient is seeing his primary care physician where he will discuss work-up for diabetes and smoking cessation.    From an orthopedic perspective instead of pursuing a 1 stage revision to rotating hinged prosthesis immediately it may be worth considering a two-stage revision with temporary static spacer around T2 intramedullary nail.  During explantation and attempt to improve the extensor mechanism could be done and given the appropriate rest to heal well eradicating a potential infection.  I will discuss such an option with my colleagues as well.      We will follow-up with patient once he has been seen by vascular surgeon, PCP and infectious disease department.      Archie Ramos MD, PhD     Adult Reconstruction  [facility] Department of Orthopaedic Surgery  Pager [phone]    PROCEDURE DATE: 5/26/2021    PROCEDURE NOTE:    After obtaining informed consent, under sterile precautions, the left knee was aspirated.  Topical 1% lidocaine was used as necessary.  There were no complications.  10 ml of synovial fluid was obtained and sent for analysis including aerobic and anaerobic cultures, cell count and differential and alpha-defensin testing.

## 2021-05-19 ENCOUNTER — OFFICE VISIT (OUTPATIENT)
Dept: INFECTIOUS DISEASES | Facility: CLINIC | Age: 66
End: 2021-05-19
Attending: INTERNAL MEDICINE
Payer: COMMERCIAL

## 2021-05-19 ENCOUNTER — PRE VISIT (OUTPATIENT)
Dept: INFECTIOUS DISEASES | Facility: CLINIC | Age: 66
End: 2021-05-19

## 2021-05-19 VITALS
TEMPERATURE: 98.3 F | OXYGEN SATURATION: 99 % | BODY MASS INDEX: 22.89 KG/M2 | HEART RATE: 59 BPM | HEIGHT: 78 IN | DIASTOLIC BLOOD PRESSURE: 76 MMHG | WEIGHT: 197.8 LBS | SYSTOLIC BLOOD PRESSURE: 159 MMHG | RESPIRATION RATE: 18 BRPM

## 2021-05-19 DIAGNOSIS — L97.329 LOWER LIMB ULCER, ANKLE, LEFT, WITH UNSPECIFIED SEVERITY (H): Primary | ICD-10-CM

## 2021-05-19 LAB
GRAM STN SPEC: ABNORMAL
Lab: ABNORMAL
Lab: ABNORMAL
SPECIMEN SOURCE: ABNORMAL
SPECIMEN SOURCE: ABNORMAL

## 2021-05-19 PROCEDURE — 87205 SMEAR GRAM STAIN: CPT | Performed by: INTERNAL MEDICINE

## 2021-05-19 PROCEDURE — 87186 SC STD MICRODIL/AGAR DIL: CPT | Performed by: INTERNAL MEDICINE

## 2021-05-19 PROCEDURE — 87077 CULTURE AEROBIC IDENTIFY: CPT | Performed by: INTERNAL MEDICINE

## 2021-05-19 PROCEDURE — 87070 CULTURE OTHR SPECIMN AEROBIC: CPT | Performed by: INTERNAL MEDICINE

## 2021-05-19 PROCEDURE — 99204 OFFICE O/P NEW MOD 45 MIN: CPT | Performed by: INTERNAL MEDICINE

## 2021-05-19 ASSESSMENT — MIFFLIN-ST. JEOR: SCORE: 1815.33

## 2021-05-19 ASSESSMENT — PAIN SCALES - GENERAL: PAINLEVEL: WORST PAIN (10)

## 2021-05-19 NOTE — LETTER
5/19/2021       RE: Casey Martinez  [address]  [facility] [address]     Dear Colleague,    Thank you for referring your patient, Casey Martinez, to the [facility] INFECTIOUS DISEASE CLIN[facility] [place] [facility]. Please see a copy of my visit note below.    POOR AUDIO    REASON FOR VISIT:  Left foot ulcers and concern for underlying chronic knee infection, status post left total knee arthroplasty.    HISTORY OF PRESENT ILLNESS:  Patient is 65 years old and he is referred by his orthopedic surgeon, Dr. Archie Ramos, because of the problems with the chronic intermittent left foot ulcers which are slow to heal.  Patient states he has had these for 5 years off and on, but the last time they were completely healed was 6 months.  The largest lesion is on the medial left foot just below the ankle and there is a newer, smaller one on the lateral side of the left foot below the ankle which has been there for 2 weeks.  They are both sore.  He has been trying to keep them covered with antibiotic ointment and dressing but he has run out of supplies.  He had been changing the dressings every 2 days.  He requests additional dressings to keep the wounds covered because they are less painful then.  Additionally, he has a lot of chronic problems with his left knee, status post total knee replacement surgeries, one of which was complicated by infection with Staph aureus and he was treated with a 2-stage replacement, explant and IV antibiotics and then reimplantation.  He has chronic instability in that left knee and chronic swelling and sought a second opinion from Orthopedics here at the [facility].  He has been seen by 2 different orthopedic surgeons.  They have noted lucency around the femoral stem in the left knee arthroplasty with osteolysis of the tibiofemoral condyle.  Dr. Sheehan had aspirated fluid from the knee in April which  was negative on both aerobic and anaerobic cultures.  He got a second orthopedic surgeon who is still concerned for the possibility of infection in that prosthetic knee and has scheduled the patient for another aspiration next week to obtain cultures.  Dr. Ramos sent him to see me for evaluation of this problem.  Dr. Ramos also advised the patient to see his primary care physician and screen for diabetes. The patient states he did see his doctor who is Dr. Medina? Natalie? (? NP) At [facility], [address] [place] [address] ([phone]).  Patient states he got screening for diabetes and has not heard those results.  Patient has received most of his medical care from outside clinics, including that center.    PAST MEDICAL HISTORY:  Multiple chronic medical problems including chronic deep venous thrombosis, has IVC filter given history of DVTs, history of peptic ulcer disease status post Billroth II procedure, gastrointestinal hemorrhage, dilated common bile duct, status post left total knee replacement in 2011 with revision in 2017 complicated with infection,  history of prurigo nodularis, history of hepatitis, history of gait difficult, history of venous ulcers of leg, venous insufficiency, chronic vertigo, history of dysphagia, history of insomnia, history of red blood cell antibodies, alcohol abuse, cocaine abuse, hepatitis C not active on most recent viral load checks, mural dermatitis, tobacco dependency, erectile dysfunction.    PAST SURGICAL HISTORY:  Laparoscopic repair of gastric fistula, prostate laser surgery.    FAMILY HISTORY:  Notable for cancer of prostate in brother, cataracts in sister, colon cancer in brother.    MEDICATIONS:  Reviewed and updated in Epic.    ALLERGIES:  Morphine.    REVIEW OF SYSTEMS:  Unremarkable except for chronic pain with left knee and open wounds in left ankle which are painful.    PHYSICAL EXAMINATION  VITAL SIGNS:  Afebrile, pulse 59, respirations 18, O2  sats 99% on room air.  GENERAL:  Alert and oriented, in no acute distress.  OROPHARYNX:  No mucosal lesions.  Poor dentition.  NECK:  Supple.  No lymphadenopathy.    LUNGS:  Clear to auscultation.  CARDIOVASCULAR:  Regular rate and rhythm.  ABDOMEN:  Positive bowel sounds, soft, nontender.   EXTREMITIES:  His left knee is really swollen with suprapatellar swelling in particular and effusion.  The left ankle is notable for a medial open wound approximately 3 cm in length, 1 cm in diameter which is full thickness skin.  It has sharp edges.  No surrounding cellulitis.  There is a smaller ulcer of about 1 cm on the lateral left ankle below the malleolus with minimal exudate or discharge.  Not as deep as the medial ankle wound.  No surrounding cellulitis.    ASSESSMENT AND PLAN:  65-year-old man with chronic left ankle wounds.  I did obtain cultures from his ankle wound today.  We will send those swabs for culture.  I dressed the wounds with bacitracin and gauze so it is wrapped today.  I wrote prescriptions for the patient to  additional supplies, including gauze, Kerlix dressing and also Neosporin ointment to apply to the ankle wounds daily after showering and to cover with gauze and wrap.  I want to see the patient back in 1 month or sooner p.r.n.         ROS: 10 point ROS neg other than the symptoms noted above in the HPI.    Again, thank you for allowing me to participate in the care of your patient.      Sincerely,    Lucinda Enciso MD

## 2021-05-19 NOTE — PROGRESS NOTES
POOR AUDIO    REASON FOR VISIT:  Left foot ulcers and concern for underlying chronic knee infection, status post left total knee arthroplasty.    HISTORY OF PRESENT ILLNESS:  Patient is 65 years old and he is referred by his orthopedic surgeon, Dr. Archie Ramos, because of the problems with the chronic intermittent left foot ulcers which are slow to heal.  Patient states he has had these for 5 years off and on, but the last time they were completely healed was 6 months.  The largest lesion is on the medial left foot just below the ankle and there is a newer, smaller one on the lateral side of the left foot below the ankle which has been there for 2 weeks.  They are both sore.  He has been trying to keep them covered with antibiotic ointment and dressing but he has run out of supplies.  He had been changing the dressings every 2 days.  He requests additional dressings to keep the wounds covered because they are less painful then.  Additionally, he has a lot of chronic problems with his left knee, status post total knee replacement surgeries, one of which was complicated by infection with Staph aureus and he was treated with a 2-stage replacement, explant and IV antibiotics and then reimplantation.  He has chronic instability in that left knee and chronic swelling and sought a second opinion from Orthopedics here at the [facility].  He has been seen by 2 different orthopedic surgeons.  They have noted lucency around the femoral stem in the left knee arthroplasty with osteolysis of the tibiofemoral condyle.  Dr. Sheehan had aspirated fluid from the knee in April which was negative on both aerobic and anaerobic cultures.  He got a second orthopedic surgeon who is still concerned for the possibility of infection in that prosthetic knee and has scheduled the patient for another aspiration next week to obtain cultures.  Dr. Ramos sent him to see me for evaluation of this problem.    Rachel also advised the patient to see his primary care physician and screen for diabetes. The patient states he did see his doctor who is Dr. Medina? Natalie? (? NP) At [facility], [address] [place] [address] ([phone]).  Patient states he got screening for diabetes and has not heard those results.  Patient has received most of his medical care from outside clinics, including that center.    PAST MEDICAL HISTORY:  Multiple chronic medical problems including chronic deep venous thrombosis, has IVC filter given history of DVTs, history of peptic ulcer disease status post Billroth II procedure, gastrointestinal hemorrhage, dilated common bile duct, status post left total knee replacement in 2011 with revision in 2017 complicated with infection,  history of prurigo nodularis, history of hepatitis, history of gait difficult, history of venous ulcers of leg, venous insufficiency, chronic vertigo, history of dysphagia, history of insomnia, history of red blood cell antibodies, alcohol abuse, cocaine abuse, hepatitis C not active on most recent viral load checks, mural dermatitis, tobacco dependency, erectile dysfunction.    PAST SURGICAL HISTORY:  Laparoscopic repair of gastric fistula, prostate laser surgery.    FAMILY HISTORY:  Notable for cancer of prostate in brother, cataracts in sister, colon cancer in brother.    MEDICATIONS:  Reviewed and updated in Epic.    ALLERGIES:  Morphine.    REVIEW OF SYSTEMS:  Unremarkable except for chronic pain with left knee and open wounds in left ankle which are painful.    PHYSICAL EXAMINATION  VITAL SIGNS:  Afebrile, pulse 59, respirations 18, O2 sats 99% on room air.  GENERAL:  Alert and oriented, in no acute distress.  OROPHARYNX:  No mucosal lesions.  Poor dentition.  NECK:  Supple.  No lymphadenopathy.    LUNGS:  Clear to auscultation.  CARDIOVASCULAR:  Regular rate and rhythm.  ABDOMEN:  Positive bowel sounds, soft, nontender.   EXTREMITIES:  His left knee is  really swollen with suprapatellar swelling in particular and effusion.  The left ankle is notable for a medial open wound approximately 3 cm in length, 1 cm in diameter which is full thickness skin.  It has sharp edges.  No surrounding cellulitis.  There is a smaller ulcer of about 1 cm on the lateral left ankle below the malleolus with minimal exudate or discharge.  Not as deep as the medial ankle wound.  No surrounding cellulitis.    ASSESSMENT AND PLAN:  65-year-old man with chronic left ankle wounds.  I did obtain cultures from his ankle wound today.  We will send those swabs for culture.  I dressed the wounds with bacitracin and gauze so it is wrapped today.  I wrote prescriptions for the patient to  additional supplies, including gauze, Kerlix dressing and also Neosporin ointment to apply to the ankle wounds daily after showering and to cover with gauze and wrap.  I want to see the patient back in 1 month or sooner p.r.n.         ROS: 10 point ROS neg other than the symptoms noted above in the HPI.

## 2021-05-22 LAB
BACTERIA SPEC CULT: ABNORMAL
Lab: ABNORMAL
SPECIMEN SOURCE: ABNORMAL

## 2021-05-23 LAB
BACTERIA SPEC CULT: ABNORMAL
Lab: ABNORMAL
SPECIMEN SOURCE: ABNORMAL

## 2021-05-26 ENCOUNTER — OFFICE VISIT (OUTPATIENT)
Dept: ORTHOPEDICS | Facility: CLINIC | Age: 66
End: 2021-05-26
Payer: COMMERCIAL

## 2021-05-26 DIAGNOSIS — T84.59XA INFECTION OF TOTAL KNEE REPLACEMENT, INITIAL ENCOUNTER (H): ICD-10-CM

## 2021-05-26 DIAGNOSIS — L97.922 ULCER OF LEFT LOWER EXTREMITY WITH FAT LAYER EXPOSED (H): Primary | ICD-10-CM

## 2021-05-26 DIAGNOSIS — Z96.659 INFECTION OF TOTAL KNEE REPLACEMENT, INITIAL ENCOUNTER (H): ICD-10-CM

## 2021-05-26 LAB
APPEARANCE FLD: NORMAL
COLOR FLD: NORMAL
EOSINOPHIL NFR FLD MANUAL: 2 %
LYMPHOCYTES NFR FLD MANUAL: 20 %
NEUTS BAND NFR FLD MANUAL: 20 %
OTHER CELLS FLD MANUAL: 58 %
SPECIMEN SOURCE FLD: NORMAL
WBC # FLD AUTO: 387 /UL

## 2021-05-26 PROCEDURE — 89051 BODY FLUID CELL COUNT: CPT | Performed by: PATHOLOGY

## 2021-05-26 PROCEDURE — 99207 PR NO CHARGE INJECTABLE MED/DRUG: CPT | Performed by: STUDENT IN AN ORGANIZED HEALTH CARE EDUCATION/TRAINING PROGRAM

## 2021-05-26 PROCEDURE — 86140 C-REACTIVE PROTEIN: CPT | Mod: 90 | Performed by: PATHOLOGY

## 2021-05-26 PROCEDURE — 87070 CULTURE OTHR SPECIMN AEROBIC: CPT | Performed by: PATHOLOGY

## 2021-05-26 PROCEDURE — 99000 SPECIMEN HANDLING OFFICE-LAB: CPT | Performed by: PATHOLOGY

## 2021-05-26 PROCEDURE — 87075 CULTR BACTERIA EXCEPT BLOOD: CPT | Performed by: PATHOLOGY

## 2021-05-26 PROCEDURE — 20610 DRAIN/INJ JOINT/BURSA W/O US: CPT | Mod: LT | Performed by: STUDENT IN AN ORGANIZED HEALTH CARE EDUCATION/TRAINING PROGRAM

## 2021-05-26 PROCEDURE — 83516 IMMUNOASSAY NONANTIBODY: CPT | Mod: 90 | Performed by: PATHOLOGY

## 2021-05-26 PROCEDURE — 99213 OFFICE O/P EST LOW 20 MIN: CPT | Mod: 25 | Performed by: STUDENT IN AN ORGANIZED HEALTH CARE EDUCATION/TRAINING PROGRAM

## 2021-05-26 NOTE — LETTER
5/26/2021         RE: Casey Martinez  [address]  [facility] [address]        Dear Colleague,    Thank you for referring your patient, Casey Martinez, to the [facility] [place]. Please see a copy of my visit note below.        [facility]  Orthopaedic Surgery Consultation by Archie Ramos M.D.    Casey Martinez MRN# [id number]   Age: 65 year old YOB: 1955     Requesting physician: Referred Self  No primary care provider on file.     Background history:  DX:  1. Osteoarthritis left knee  2. Osteoarthritis right shoulder with significant arthritic changes of glenohumeral and AC joint.  Most likely presence of rotator cuff tear.  3. Recurrent DVT/PE: Advise Dr. Boateng: Do not use Xarelto.  Postoperative prophylactic enoxaparin or apixaban is advised.  4. Recurrent GI bleeding  5. Hepatitis C, no longer detectable  6. Pancreatitis  7. 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 3/3/2011, left total knee arthroplasty, Dr. Braden, [facility] [place] (Grace and Nephew Renée II total knee arthroplasty with a size 8 cemented cruciate-retaining femoral component, a size 8 cemented keeled tibial component, a 35-mm resurfacing polyethylene patella, and a 9-mm cruciate retaining tibial polyethylene insert)  2. 4/4/2021, I&D and DAIR  left total knee arthroplasty, Dr. Braden [facility] [place]  3. 6/9/2011, Removal of left total knee arthroplasty, I&D left total knee arthroplasty, Dr. Braden [facility] [place]  4. 6/30/2011, I&D left total knee arthroplasty, Dr. Braden [facility] [place]  5. 8/25/2011, revision left total knee arthroplasty, Dr. Braden [facility] [place] (Grace & Nephew Renée II Revision Knee system: Size 7 Constrained femur with a  mm stem and; Size 7 tibial tray with a 14 x 200mm stem; 11 mm PS poly insert)  6. 7/17/2017, removal of left total knee arthroplasty femoral component and placement of antibiotic spacer, Dr. Braden [facility] [place]. MSSA +  7. 7/20/2017, revision left total knee  arthroplasty, Dr. Braden [facility] [place] [facility].  (Smith & Nephew Legion Revision Knee system: Size 6 revision femur with a  mm stem and 4 mm offset , 21 mm constrained poly insert.)               History of Present Illness:     65-year-old male with past medical history notable for GI bleeds, recurrent DVT/PE and a multiply revised left total knee arthroplasty due to MSSA infection with progressive pain and instability due to loosening of femoral component.  Initial work-up does not seem consistent with recurrent infection however a high index of suspicion is present.  Besides the loosening of the femoral component whether this being septic or aseptic the extensor mechanism is compromised at the level of the quadriceps tendon.  Furthermore, patient is a smoker and has an ulcer on the medial side of ankle.      65 year old male who was referred to our clinic by Dr. Sheehan because of loosening of his stemmed femoral component with the question of revising the left total knee arthroplasty to a hinged prosthesis.  He has a complex medical history concerning his knee with his index knee was done in 2011, and he had a deep infection within a few months that was treated by a two-stage resection arthroplasty - reimplantation. He had obvious movement of his femoral stem which was pushing through the anterolateral femoral diaphysis. He is admitted for revision surgeryand had explantation of his femoral component. He had a temporary PMMA spacer inserted. Some, but not all, of his intraoperative cultures were growing MSSA, but his synovial fluid analysis was completely clear. Has has been seen by ID and it is felt that it is appropriate to proceed with re-implantation, with plans to continue his antibiotics.  On 7/20/2017 a highly constrained femoral component was placed.    Patient presents because of persistent and progressive pain of left knee.  He describes instability in both varus and valgus direction as well as in AP  direction.  Pain is present continuously and worse during ambulation.  He needs assistive devices to move around.  All he can do is mobilize in and around the house.  Patient cannot fully extend his knee which he says has been present since 2017.     To complicate things further patient is having an ulcer on the medial side of left ankle.  She states he has had these in the past and they take approximately 6 months to heal.  He denies being diagnosed with diabetes.  He does smoke.  He has never had a vascular work-up done.    Social:   Occupation: not currently working  Living situation: lives with his girlfriend  Hobbies / Sports: watching TV     Smoking: Yes  Alcohol: No  Illicit drug use: No         Physical Exam:     EXAMINATION pertinent findings:   PSYCH: Pleasant, healthy-appearing, alert, oriented x3, cooperative. Normal mood and affect.  VITAL SIGNS: There were no vitals taken for this visit.  Reviewed nursing intake notes.   There is no height or weight on file to calculate BMI.  RESP: non labored breathing   ABD: benign, soft, non-tender, no acute peritoneal findings  SKIN: grossly normal     MUSCULOSKELETAL:   Alignment: Neutral  Gait: Antalgic with shortened stance over left lower extremity.  Clear limp.  The left hip exhibits a full range of motion.     L knee: -30-0  .  Presence of extensor deficit.  Passively the knee can be brought to full extension.   No redness, warmth or skin changes present.  Wide but well-healed incision.  No clear palpable divot at suprapatellar pole.  Effusion Yes.  Grossly unstable knee and in both ML and AP direction.  PF tracking seems reasonable with dozens of some crepitus.     Left LE:   Thigh and leg compartments soft and compressible   +Quad/TA/GSC/FHL/EHL   SILT DP/SP/Rosalind/Saph/Tib nerve distributions   Dorsalis pedis pulse is not palpable.  Presence of ulcer medial ankle.                         Data:   All laboratory data reviewed    Aspiration results  2/4/2021:     Aerobic/anaerobic cultures no growth to date    Gram stain negative    Cell count differential  with 35% neutrophils    No Synovasure testing available.    All imaging studies reviewed by me personally.    XR knee left 1/14/2021:  Status post revision left total knee arthroplasty.  Progressive loosening and subsidence of femoral component over time compared to previous imaging studies.  Fracturing of medial condyle remnant compared to immediate postoperative imaging studies.  Tibial component seems well fixed.  Presence of patella baja most likely due to insufficiency of quadriceps tendon.         Assessment and Plan:   Assessment:  65-year-old male with past medical history notable for GI bleeds, recurrent DVT/PE and a multiply revised left total knee arthroplasty due to MSSA infection with progressive pain and instability due to loosening of femoral component.  Initial work-up does not seem consistent with recurrent infection however a high index of suspicion is present.  Besides the loosening of the femoral component whether this being septic or aseptic the extensor mechanism is compromised at the level of the quadriceps tendon.  Furthermore, patient is a smoker and has an ulcer on the medial side of ankle.      Plan:  I once more extensively discussed my findings with the patient.  There are multiple complicating factors in his past medical and surgical history to proceed with a 1 stage revision to hinged prosthesis.  I conferred with my arthroplasty colleagues and the consensus is to obtain a renewed aspiration of left knee and send specimen also for Synovasure testing.  Today after obtaining informed consent a repeat aspiration of the knee was performed.  10 cc of serosanguineous synovial fluid was obtained.    Furthermore, the vascular status of his left lower extremity seems suboptimal because of his medial ankle ulcer and now his new lateral ulcer and would necessitate further  evaluation and treatment before even considering revision surgery.    Patient would need to stop smoking which he articulates is willing to do.    Lastly I am concerned about the compromised extensor mechanism and patella baja.     I repeated my proposal to have patient evaluated by a vascular surgeon in order to optimize wound healing of his left lower extremity.  Furthermore I would like him to be evaluated by the infectious disease doctors to ascertain there is no other source of infection/seeding that explains the recurrent infections.  Tomorrow patient is seeing his primary care physician where he will discuss work-up for diabetes and smoking cessation.    From an orthopedic perspective instead of pursuing a 1 stage revision to rotating hinged prosthesis immediately it may be worth considering a two-stage revision with temporary static spacer around T2 intramedullary nail.  During explantation and attempt to improve the extensor mechanism could be done and given the appropriate rest to heal well eradicating a potential infection.  I will discuss such an option with my colleagues as well.      We will follow-up with patient once he has been seen by vascular surgeon, PCP and infectious disease department.      Archie Ramos MD, PhD     Adult Reconstruction  [facility] Department of Orthopaedic Surgery  Pager [phone]    PROCEDURE DATE: 5/26/2021    PROCEDURE NOTE:    After obtaining informed consent, under sterile precautions, the left knee was aspirated.  Topical 1% lidocaine was used as necessary.  There were no complications.  10 ml of synovial fluid was obtained and sent for analysis including aerobic and anaerobic cultures, cell count and differential and alpha-defensin testing.

## 2021-05-31 LAB
BACTERIA SPEC CULT: NO GROWTH
Lab: NORMAL
SPECIMEN SOURCE: NORMAL

## 2021-06-04 DIAGNOSIS — L97.329 LOWER LIMB ULCER, ANKLE, LEFT, WITH UNSPECIFIED SEVERITY (H): Primary | ICD-10-CM

## 2021-06-04 RX ORDER — LEVOFLOXACIN 500 MG/1
500 TABLET, FILM COATED ORAL DAILY
Qty: 14 TABLET | Refills: 0 | Status: SHIPPED | OUTPATIENT
Start: 2021-06-04 | End: 2021-06-18

## 2021-06-04 NOTE — PROGRESS NOTES
I called patient with the results of his wound cultures and to see how he was doing with local wound cares. He says the foot ulcers are getting worse. He is not doing the dressing changes because he couldn't pay for the supplies. I resent (refilled the prescriptions for the ointment and dressings for his foot wounds and sent a prescription for levofloxacin to his pharmacy at [facility]. He said he would pick them up and then keep his f/u appointment with me in July.  Lucinda Enciso MD

## 2021-06-06 LAB
SPECIMEN SOURCE: NORMAL
SYNOVASURE PERIPROSTHETIC JOINT INFECTION DECTION: NORMAL

## 2021-06-09 LAB
BACTERIA SPEC CULT: NORMAL
Lab: NORMAL
SPECIMEN SOURCE: NORMAL

## 2021-07-28 ENCOUNTER — HOSPITAL ENCOUNTER (OUTPATIENT)
Facility: CLINIC | Age: 66
Setting detail: OBSERVATION
Discharge: HOME | End: 2021-07-31
Attending: EMERGENCY MEDICINE | Admitting: HOSPITALIST
Payer: COMMERCIAL

## 2021-07-28 ENCOUNTER — APPOINTMENT (OUTPATIENT)
Dept: CT IMAGING | Facility: CLINIC | Age: 66
End: 2021-07-28
Attending: EMERGENCY MEDICINE
Payer: COMMERCIAL

## 2021-07-28 ENCOUNTER — APPOINTMENT (OUTPATIENT)
Dept: GENERAL RADIOLOGY | Facility: CLINIC | Age: 66
End: 2021-07-28
Attending: EMERGENCY MEDICINE
Payer: COMMERCIAL

## 2021-07-28 DIAGNOSIS — R41.82 ALTERED MENTAL STATUS, UNSPECIFIED ALTERED MENTAL STATUS TYPE: ICD-10-CM

## 2021-07-28 DIAGNOSIS — I95.9 HYPOTENSION, UNSPECIFIED HYPOTENSION TYPE: ICD-10-CM

## 2021-07-28 LAB
ABO/RH(D): NORMAL
ALBUMIN SERPL-MCNC: 3.1 G/DL (ref 3.4–5)
ALBUMIN UR-MCNC: NEGATIVE MG/DL
ALP SERPL-CCNC: 75 U/L (ref 40–150)
ALT SERPL W P-5'-P-CCNC: 19 U/L (ref 0–70)
ANION GAP SERPL CALCULATED.3IONS-SCNC: 4 MMOL/L (ref 3–14)
ANTIBODY SCREEN: NEGATIVE
APPEARANCE UR: CLEAR
AST SERPL W P-5'-P-CCNC: 17 U/L (ref 0–45)
ATRIAL RATE - MUSE: 75 BPM
BASOPHILS # BLD AUTO: 0 10E3/UL (ref 0–0.2)
BASOPHILS NFR BLD AUTO: 1 %
BILIRUB SERPL-MCNC: 0.3 MG/DL (ref 0.2–1.3)
BILIRUB UR QL STRIP: NEGATIVE
BUN SERPL-MCNC: 21 MG/DL (ref 7–30)
CALCIUM SERPL-MCNC: 8.9 MG/DL (ref 8.5–10.1)
CHLORIDE BLD-SCNC: 112 MMOL/L (ref 94–109)
CO2 SERPL-SCNC: 25 MMOL/L (ref 20–32)
COLOR UR AUTO: YELLOW
CREAT SERPL-MCNC: 1.68 MG/DL (ref 0.66–1.25)
DIASTOLIC BLOOD PRESSURE - MUSE: NORMAL MMHG
EOSINOPHIL # BLD AUTO: 0.2 10E3/UL (ref 0–0.7)
EOSINOPHIL NFR BLD AUTO: 7 %
ERYTHROCYTE [DISTWIDTH] IN BLOOD BY AUTOMATED COUNT: 16.4 % (ref 10–15)
ETHANOL SERPL-MCNC: <0.01 G/DL
GFR SERPL CREATININE-BSD FRML MDRD: 42 ML/MIN/1.73M2
GLUCOSE BLD-MCNC: 122 MG/DL (ref 70–99)
GLUCOSE UR STRIP-MCNC: NEGATIVE MG/DL
HCT VFR BLD AUTO: 34.1 % (ref 40–53)
HGB BLD-MCNC: 11.3 G/DL (ref 13.3–17.7)
HGB UR QL STRIP: NEGATIVE
HOLD SPECIMEN: NORMAL
HYALINE CASTS: 1 /LPF
IMM GRANULOCYTES # BLD: 0 10E3/UL
IMM GRANULOCYTES NFR BLD: 0 %
INR PPP: 1.17 (ref 0.85–1.15)
INTERPRETATION ECG - MUSE: NORMAL
KETONES UR STRIP-MCNC: NEGATIVE MG/DL
LEUKOCYTE ESTERASE UR QL STRIP: NEGATIVE
LYMPHOCYTES # BLD AUTO: 1 10E3/UL (ref 0.8–5.3)
LYMPHOCYTES NFR BLD AUTO: 30 %
MAGNESIUM SERPL-MCNC: 2.2 MG/DL (ref 1.6–2.3)
MCH RBC QN AUTO: 29.2 PG (ref 26.5–33)
MCHC RBC AUTO-ENTMCNC: 33.1 G/DL (ref 31.5–36.5)
MCV RBC AUTO: 88 FL (ref 78–100)
MONOCYTES # BLD AUTO: 0.3 10E3/UL (ref 0–1.3)
MONOCYTES NFR BLD AUTO: 8 %
NEUTROPHILS # BLD AUTO: 1.7 10E3/UL (ref 1.6–8.3)
NEUTROPHILS NFR BLD AUTO: 54 %
NITRATE UR QL: NEGATIVE
NRBC # BLD AUTO: 0 10E3/UL
NRBC BLD AUTO-RTO: 0 /100
P AXIS - MUSE: 65 DEGREES
PH UR STRIP: 6 [PH] (ref 5–7)
PLATELET # BLD AUTO: 191 10E3/UL (ref 150–450)
POTASSIUM BLD-SCNC: 3.3 MMOL/L (ref 3.4–5.3)
PR INTERVAL - MUSE: 124 MS
PROT SERPL-MCNC: 6.9 G/DL (ref 6.8–8.8)
QRS DURATION - MUSE: 104 MS
QT - MUSE: 420 MS
QTC - MUSE: 469 MS
R AXIS - MUSE: 80 DEGREES
RBC # BLD AUTO: 3.87 10E6/UL (ref 4.4–5.9)
RBC URINE: 4 /HPF
SARS-COV-2 RNA RESP QL NAA+PROBE: NEGATIVE
SODIUM SERPL-SCNC: 141 MMOL/L (ref 133–144)
SP GR UR STRIP: 1.02 (ref 1–1.03)
SPECIMEN EXPIRATION DATE: NORMAL
SYSTOLIC BLOOD PRESSURE - MUSE: NORMAL MMHG
T AXIS - MUSE: 77 DEGREES
TROPONIN I SERPL-MCNC: <0.015 UG/L (ref 0–0.04)
UROBILINOGEN UR STRIP-MCNC: NORMAL MG/DL
VENTRICULAR RATE- MUSE: 75 BPM
WBC # BLD AUTO: 3.2 10E3/UL (ref 4–11)
WBC URINE: 2 /HPF

## 2021-07-28 PROCEDURE — 80307 DRUG TEST PRSMV CHEM ANLYZR: CPT | Performed by: EMERGENCY MEDICINE

## 2021-07-28 PROCEDURE — 81001 URINALYSIS AUTO W/SCOPE: CPT | Performed by: EMERGENCY MEDICINE

## 2021-07-28 PROCEDURE — 99223 1ST HOSP IP/OBS HIGH 75: CPT | Mod: AI | Performed by: HOSPITALIST

## 2021-07-28 PROCEDURE — 99291 CRITICAL CARE FIRST HOUR: CPT | Mod: 25

## 2021-07-28 PROCEDURE — 99292 CRITICAL CARE ADDL 30 MIN: CPT

## 2021-07-28 PROCEDURE — 87635 SARS-COV-2 COVID-19 AMP PRB: CPT | Performed by: EMERGENCY MEDICINE

## 2021-07-28 PROCEDURE — 85025 COMPLETE CBC W/AUTO DIFF WBC: CPT | Performed by: EMERGENCY MEDICINE

## 2021-07-28 PROCEDURE — 96361 HYDRATE IV INFUSION ADD-ON: CPT

## 2021-07-28 PROCEDURE — 86900 BLOOD TYPING SEROLOGIC ABO: CPT | Performed by: EMERGENCY MEDICINE

## 2021-07-28 PROCEDURE — 71045 X-RAY EXAM CHEST 1 VIEW: CPT

## 2021-07-28 PROCEDURE — 83735 ASSAY OF MAGNESIUM: CPT | Performed by: EMERGENCY MEDICINE

## 2021-07-28 PROCEDURE — 36415 COLL VENOUS BLD VENIPUNCTURE: CPT | Performed by: EMERGENCY MEDICINE

## 2021-07-28 PROCEDURE — 86922 COMPATIBILITY TEST ANTIGLOB: CPT | Performed by: EMERGENCY MEDICINE

## 2021-07-28 PROCEDURE — 80053 COMPREHEN METABOLIC PANEL: CPT | Performed by: EMERGENCY MEDICINE

## 2021-07-28 PROCEDURE — 84484 ASSAY OF TROPONIN QUANT: CPT | Performed by: EMERGENCY MEDICINE

## 2021-07-28 PROCEDURE — C9803 HOPD COVID-19 SPEC COLLECT: HCPCS

## 2021-07-28 PROCEDURE — 96360 HYDRATION IV INFUSION INIT: CPT

## 2021-07-28 PROCEDURE — 82077 ASSAY SPEC XCP UR&BREATH IA: CPT | Performed by: EMERGENCY MEDICINE

## 2021-07-28 PROCEDURE — 87040 BLOOD CULTURE FOR BACTERIA: CPT | Performed by: EMERGENCY MEDICINE

## 2021-07-28 PROCEDURE — 85610 PROTHROMBIN TIME: CPT | Performed by: EMERGENCY MEDICINE

## 2021-07-28 PROCEDURE — 99207 PR CDG-CODE CATEGORY CHANGED: CPT | Performed by: HOSPITALIST

## 2021-07-28 PROCEDURE — 70450 CT HEAD/BRAIN W/O DYE: CPT

## 2021-07-28 PROCEDURE — 258N000003 HC RX IP 258 OP 636: Performed by: EMERGENCY MEDICINE

## 2021-07-28 PROCEDURE — 93005 ELECTROCARDIOGRAM TRACING: CPT

## 2021-07-28 RX ORDER — SODIUM CHLORIDE 9 MG/ML
INJECTION, SOLUTION INTRAVENOUS CONTINUOUS
Status: DISCONTINUED | OUTPATIENT
Start: 2021-07-28 | End: 2021-07-29

## 2021-07-28 RX ORDER — SODIUM CHLORIDE, SODIUM LACTATE, POTASSIUM CHLORIDE, CALCIUM CHLORIDE 600; 310; 30; 20 MG/100ML; MG/100ML; MG/100ML; MG/100ML
125 INJECTION, SOLUTION INTRAVENOUS CONTINUOUS
Status: DISCONTINUED | OUTPATIENT
Start: 2021-07-28 | End: 2021-07-29

## 2021-07-28 RX ADMIN — SODIUM CHLORIDE 1000 ML: 9 INJECTION, SOLUTION INTRAVENOUS at 22:09

## 2021-07-28 RX ADMIN — SODIUM CHLORIDE, POTASSIUM CHLORIDE, SODIUM LACTATE AND CALCIUM CHLORIDE 125 ML/HR: 600; 310; 30; 20 INJECTION, SOLUTION INTRAVENOUS at 23:12

## 2021-07-28 RX ADMIN — SODIUM CHLORIDE, POTASSIUM CHLORIDE, SODIUM LACTATE AND CALCIUM CHLORIDE 1000 ML: 600; 310; 30; 20 INJECTION, SOLUTION INTRAVENOUS at 22:38

## 2021-07-28 RX ADMIN — SODIUM CHLORIDE 1000 ML: 9 INJECTION, SOLUTION INTRAVENOUS at 21:23

## 2021-07-28 ASSESSMENT — ENCOUNTER SYMPTOMS: SEIZURES: 1

## 2021-07-29 ENCOUNTER — HOSPITAL ENCOUNTER (OUTPATIENT)
Dept: NEUROLOGY | Facility: CLINIC | Age: 66
Setting detail: OBSERVATION
End: 2021-07-29
Attending: HOSPITALIST
Payer: COMMERCIAL

## 2021-07-29 ENCOUNTER — APPOINTMENT (OUTPATIENT)
Dept: CARDIOLOGY | Facility: CLINIC | Age: 66
End: 2021-07-29
Attending: HOSPITALIST
Payer: COMMERCIAL

## 2021-07-29 LAB
ALBUMIN SERPL-MCNC: 2.8 G/DL (ref 3.4–5)
ALP SERPL-CCNC: 81 U/L (ref 40–150)
ALT SERPL W P-5'-P-CCNC: 16 U/L (ref 0–70)
AMPHETAMINES UR QL SCN: ABNORMAL
ANION GAP SERPL CALCULATED.3IONS-SCNC: 4 MMOL/L (ref 3–14)
AST SERPL W P-5'-P-CCNC: 17 U/L (ref 0–45)
BARBITURATES UR QL: ABNORMAL
BASOPHILS # BLD AUTO: 0 10E3/UL (ref 0–0.2)
BASOPHILS NFR BLD AUTO: 1 %
BENZODIAZ UR QL: ABNORMAL
BILIRUB SERPL-MCNC: 0.3 MG/DL (ref 0.2–1.3)
BUN SERPL-MCNC: 16 MG/DL (ref 7–30)
CALCIUM SERPL-MCNC: 8.5 MG/DL (ref 8.5–10.1)
CANNABINOIDS UR QL SCN: ABNORMAL
CHLORIDE BLD-SCNC: 114 MMOL/L (ref 94–109)
CO2 SERPL-SCNC: 24 MMOL/L (ref 20–32)
COCAINE UR QL: ABNORMAL
CREAT SERPL-MCNC: 1.2 MG/DL (ref 0.66–1.25)
EOSINOPHIL # BLD AUTO: 0.2 10E3/UL (ref 0–0.7)
EOSINOPHIL NFR BLD AUTO: 5 %
ERYTHROCYTE [DISTWIDTH] IN BLOOD BY AUTOMATED COUNT: 16.2 % (ref 10–15)
GFR SERPL CREATININE-BSD FRML MDRD: 63 ML/MIN/1.73M2
GLUCOSE BLD-MCNC: 95 MG/DL (ref 70–99)
HCT VFR BLD AUTO: 31.5 % (ref 40–53)
HGB BLD-MCNC: 10.6 G/DL (ref 13.3–17.7)
IMM GRANULOCYTES # BLD: 0 10E3/UL
IMM GRANULOCYTES NFR BLD: 0 %
LACTATE SERPL-SCNC: 1 MMOL/L (ref 0.7–2)
LACTATE SERPL-SCNC: 1.2 MMOL/L (ref 0.7–2)
LVEF ECHO: NORMAL
LYMPHOCYTES # BLD AUTO: 1.1 10E3/UL (ref 0.8–5.3)
LYMPHOCYTES NFR BLD AUTO: 25 %
MAGNESIUM SERPL-MCNC: 1.8 MG/DL (ref 1.6–2.3)
MCH RBC QN AUTO: 29.7 PG (ref 26.5–33)
MCHC RBC AUTO-ENTMCNC: 33.7 G/DL (ref 31.5–36.5)
MCV RBC AUTO: 88 FL (ref 78–100)
MONOCYTES # BLD AUTO: 0.3 10E3/UL (ref 0–1.3)
MONOCYTES NFR BLD AUTO: 7 %
NEUTROPHILS # BLD AUTO: 2.8 10E3/UL (ref 1.6–8.3)
NEUTROPHILS NFR BLD AUTO: 62 %
NRBC # BLD AUTO: 0 10E3/UL
NRBC BLD AUTO-RTO: 0 /100
OPIATES UR QL SCN: ABNORMAL
PCP UR QL SCN: ABNORMAL
PLATELET # BLD AUTO: 191 10E3/UL (ref 150–450)
POTASSIUM BLD-SCNC: 3.8 MMOL/L (ref 3.4–5.3)
POTASSIUM BLD-SCNC: 3.8 MMOL/L (ref 3.4–5.3)
PROT SERPL-MCNC: 6.2 G/DL (ref 6.8–8.8)
RBC # BLD AUTO: 3.57 10E6/UL (ref 4.4–5.9)
SODIUM SERPL-SCNC: 142 MMOL/L (ref 133–144)
WBC # BLD AUTO: 4.5 10E3/UL (ref 4–11)

## 2021-07-29 PROCEDURE — 83605 ASSAY OF LACTIC ACID: CPT | Performed by: EMERGENCY MEDICINE

## 2021-07-29 PROCEDURE — G0378 HOSPITAL OBSERVATION PER HR: HCPCS

## 2021-07-29 PROCEDURE — 93306 TTE W/DOPPLER COMPLETE: CPT

## 2021-07-29 PROCEDURE — 258N000003 HC RX IP 258 OP 636: Performed by: HOSPITALIST

## 2021-07-29 PROCEDURE — 82040 ASSAY OF SERUM ALBUMIN: CPT | Performed by: HOSPITALIST

## 2021-07-29 PROCEDURE — 93306 TTE W/DOPPLER COMPLETE: CPT | Mod: 26 | Performed by: INTERNAL MEDICINE

## 2021-07-29 PROCEDURE — 96374 THER/PROPH/DIAG INJ IV PUSH: CPT

## 2021-07-29 PROCEDURE — 84132 ASSAY OF SERUM POTASSIUM: CPT | Performed by: HOSPITALIST

## 2021-07-29 PROCEDURE — 95816 EEG AWAKE AND DROWSY: CPT

## 2021-07-29 PROCEDURE — 97602 WOUND(S) CARE NON-SELECTIVE: CPT

## 2021-07-29 PROCEDURE — 250N000013 HC RX MED GY IP 250 OP 250 PS 637: Performed by: HOSPITALIST

## 2021-07-29 PROCEDURE — 85025 COMPLETE CBC W/AUTO DIFF WBC: CPT | Performed by: HOSPITALIST

## 2021-07-29 PROCEDURE — 250N000011 HC RX IP 250 OP 636: Performed by: HOSPITALIST

## 2021-07-29 PROCEDURE — 96361 HYDRATE IV INFUSION ADD-ON: CPT

## 2021-07-29 PROCEDURE — 36415 COLL VENOUS BLD VENIPUNCTURE: CPT | Performed by: HOSPITALIST

## 2021-07-29 PROCEDURE — G0463 HOSPITAL OUTPT CLINIC VISIT: HCPCS

## 2021-07-29 PROCEDURE — 99225 PR SUBSEQUENT OBSERVATION CARE,LEVEL II: CPT | Performed by: HOSPITALIST

## 2021-07-29 PROCEDURE — 36415 COLL VENOUS BLD VENIPUNCTURE: CPT | Performed by: EMERGENCY MEDICINE

## 2021-07-29 PROCEDURE — 83735 ASSAY OF MAGNESIUM: CPT | Performed by: HOSPITALIST

## 2021-07-29 RX ORDER — ONDANSETRON 4 MG/1
4 TABLET, ORALLY DISINTEGRATING ORAL EVERY 6 HOURS PRN
Status: DISCONTINUED | OUTPATIENT
Start: 2021-07-29 | End: 2021-07-31 | Stop reason: HOSPADM

## 2021-07-29 RX ORDER — ACETAMINOPHEN 325 MG/1
650 TABLET ORAL EVERY 6 HOURS PRN
Status: DISCONTINUED | OUTPATIENT
Start: 2021-07-29 | End: 2021-07-31 | Stop reason: HOSPADM

## 2021-07-29 RX ORDER — OXYCODONE HYDROCHLORIDE 5 MG/1
5 TABLET ORAL EVERY 4 HOURS PRN
Status: DISCONTINUED | OUTPATIENT
Start: 2021-07-29 | End: 2021-07-31 | Stop reason: HOSPADM

## 2021-07-29 RX ORDER — SODIUM CHLORIDE 9 MG/ML
INJECTION, SOLUTION INTRAVENOUS CONTINUOUS
Status: DISCONTINUED | OUTPATIENT
Start: 2021-07-29 | End: 2021-07-29

## 2021-07-29 RX ORDER — MAGNESIUM SULFATE HEPTAHYDRATE 40 MG/ML
2 INJECTION, SOLUTION INTRAVENOUS ONCE
Status: COMPLETED | OUTPATIENT
Start: 2021-07-29 | End: 2021-07-29

## 2021-07-29 RX ORDER — NALOXONE HYDROCHLORIDE 0.4 MG/ML
0.4 INJECTION, SOLUTION INTRAMUSCULAR; INTRAVENOUS; SUBCUTANEOUS
Status: DISCONTINUED | OUTPATIENT
Start: 2021-07-29 | End: 2021-07-31 | Stop reason: HOSPADM

## 2021-07-29 RX ORDER — NALOXONE HYDROCHLORIDE 0.4 MG/ML
0.2 INJECTION, SOLUTION INTRAMUSCULAR; INTRAVENOUS; SUBCUTANEOUS
Status: DISCONTINUED | OUTPATIENT
Start: 2021-07-29 | End: 2021-07-31 | Stop reason: HOSPADM

## 2021-07-29 RX ORDER — POLYETHYLENE GLYCOL 3350 17 G/17G
17 POWDER, FOR SOLUTION ORAL DAILY PRN
Status: DISCONTINUED | OUTPATIENT
Start: 2021-07-29 | End: 2021-07-31 | Stop reason: HOSPADM

## 2021-07-29 RX ORDER — ONDANSETRON 2 MG/ML
4 INJECTION INTRAMUSCULAR; INTRAVENOUS EVERY 6 HOURS PRN
Status: DISCONTINUED | OUTPATIENT
Start: 2021-07-29 | End: 2021-07-31 | Stop reason: HOSPADM

## 2021-07-29 RX ORDER — BISACODYL 10 MG
10 SUPPOSITORY, RECTAL RECTAL DAILY PRN
Status: DISCONTINUED | OUTPATIENT
Start: 2021-07-29 | End: 2021-07-31 | Stop reason: HOSPADM

## 2021-07-29 RX ORDER — LIDOCAINE 40 MG/G
CREAM TOPICAL
Status: DISCONTINUED | OUTPATIENT
Start: 2021-07-29 | End: 2021-07-31 | Stop reason: HOSPADM

## 2021-07-29 RX ORDER — ACETAMINOPHEN 650 MG/1
650 SUPPOSITORY RECTAL EVERY 6 HOURS PRN
Status: DISCONTINUED | OUTPATIENT
Start: 2021-07-29 | End: 2021-07-31 | Stop reason: HOSPADM

## 2021-07-29 RX ORDER — LORAZEPAM 2 MG/ML
2 INJECTION INTRAMUSCULAR
Status: DISCONTINUED | OUTPATIENT
Start: 2021-07-29 | End: 2021-07-31 | Stop reason: HOSPADM

## 2021-07-29 RX ADMIN — SODIUM CHLORIDE: 9 INJECTION, SOLUTION INTRAVENOUS at 04:18

## 2021-07-29 RX ADMIN — MAGNESIUM SULFATE HEPTAHYDRATE 2 G: 40 INJECTION, SOLUTION INTRAVENOUS at 07:14

## 2021-07-29 RX ADMIN — OXYCODONE HYDROCHLORIDE 5 MG: 5 TABLET ORAL at 13:01

## 2021-07-29 RX ADMIN — OXYCODONE HYDROCHLORIDE 5 MG: 5 TABLET ORAL at 17:53

## 2021-07-29 RX ADMIN — ACETAMINOPHEN 650 MG: 325 TABLET, FILM COATED ORAL at 05:27

## 2021-07-29 RX ADMIN — OXYCODONE HYDROCHLORIDE 5 MG: 5 TABLET ORAL at 22:13

## 2021-07-29 RX ADMIN — SODIUM CHLORIDE: 9 INJECTION, SOLUTION INTRAVENOUS at 13:42

## 2021-07-29 ASSESSMENT — ACTIVITIES OF DAILY LIVING (ADL): ADLS_ACUITY_SCORE: 18

## 2021-07-29 NOTE — CONSULTS
[facility] [place]  [facility]    Neurology Consultation    Casey Martinez MRN# [id number]   YOB: 1955 Age: 65 year old    Code Status:Full Code   Date of Admission: 7/28/2021  Date of Consult:07/29/2021                                                                                       Assessment and Plan:                                         #.  Episode of loss of consciousness most likely a syncopal spell  --The description of the onset of the spell is suggestive of orthostatic hypotension.  The atypical features that according to him he did not know anything until he got to the hospital which is somewhat prolonged loss of consciousness for somebody who has had a syncope.  He says that 6 weeks ago he had a similar episode for which she was taken to [facility] and was told that he is dehydrated.  Yesterday his blood pressures were also in the low ranges.    His EEG does not show any evidence of ongoing seizures or a tendency toward seizures.    I would not recommend any neurological intervention at this time except may be adjusting his antihypertensives.    Please call with questions.    Kimani Harrison MD  Neurologist  [facility] Neu[facility]  Office [phone]        ----------------------------------------------------------------------------------  ----------------------------------------------------------------------------------  Reason for consult: I was asked by Dr. Lu to evaluate this patient for loss of consciousness.       Chief Complaint:   Loss of consciousness  History is obtained from the patient / chart       History of Present Illness:   This patient is a 65 year old male who presents with episode of loss of consciousness.    He states that he was sitting in his room with his roommate at the table and he tried to stand up and felt lightheaded and dizzy and fell back to the chair and thus the last thing he remembers.   The resting he remembers is being in the hospital.    Reportedly according to the roommate who states that he saw him suddenly slumped over in his kitchen chair.  He was helped to the ground and convulse for about 5 minutes.  The EMS found him lethargic with pinpoint pupils but Narcan administration did not change his condition.  Details of the history are not clear.     His blood pressure readings in the ER were as low as 73/58.    According to the patient he had a similar episode about 6 weeks ago when he was admitted to [facility] and was told that he had dehydration.  At that time he also had similar dizzy spells and loss of consciousness.     He admits to taking gabapentin and narcotics for his left knee pain and foot ulcers.         Past Medical History:     Past Medical History:   Diagnosis Date     Anemia      Chronic pain      Cocaine abuse (H)      DVT (deep venous thrombosis) (H)      GI bleed      Gout      Hepatitis C      HTN (hypertension)      Infection of total knee replacement (H)     MRSA     Pancreatitis      Positive antinuclear antibody 2016     PUD (peptic ulcer disease)      Pulmonary embolism (H)     Status post IVC Filter placement     Venous ulcer (H)     in bilateral lower extremities         Past Surgical History:     Past Surgical History:   Procedure Laterality Date     HI LAPAROSCOPIC BILLROTH II GASTROJEJUNOSTOMY       REPLACEMENT TOTAL KNEE            Social History:     Social History     Socioeconomic History     Marital status:      Spouse name: Not on file     Number of children: Not on file     Years of education: Not on file     Highest education level: Not on file   Occupational History     Not on file   Tobacco Use     Smoking status: Current Every Day Smoker     Packs/day: 0.25     Smokeless tobacco: Never Used   Substance and Sexual Activity     Alcohol use: Yes     Drug use: Yes     Sexual activity: Not on file   Other Topics Concern     Parent/sibling w/  "CABG, MI or angioplasty before 65F 55M? Not Asked   Social History Narrative     Not on file     Social Determinants of Health     Financial Resource Strain:      Difficulty of Paying Living Expenses:    Food Insecurity:      Worried About Running Out of Food in the Last Year:      Ran Out of Food in the Last Year:    Transportation Needs:      Lack of Transportation (Medical):      Lack of Transportation (Non-Medical):    Physical Activity:      Days of Exercise per Week:      Minutes of Exercise per Session:    Stress:      Feeling of Stress :    Social Connections:      Frequency of Communication with Friends and Family:      Frequency of Social Gatherings with Friends and Family:      Attends Faith Services:      Active Member of Clubs or Organizations:      Attends Club or Organization Meetings:      Marital Status:    Intimate Partner Violence:      Fear of Current or Ex-Partner:      Emotionally Abused:      Physically Abused:      Sexually Abused:      Patient denies smoking, no significant alcohol intake, denies illicit drugs use       Family History:     Family History   Problem Relation Age of Onset     Colon Cancer Brother      Reviewed and not felt to be contributory.        Home Medications:     Prior to Admission Medications   Prescriptions Last Dose Informant Patient Reported? Taking?   Ferrous Sulfate 324 MG TBEC   Yes No   Sig: Take 324 mg by mouth daily   Gauze Pads & Dressings (KERLIX BANDAGE ROLL 4.5\"X9.3') MISC   No No   Si Piece daily   Gauze Pads & Dressings 4-1/2\"X4-1/2\" PADS   No No   Si Pieces daily   Specialty Vitamins Products (VITAMINS FOR HAIR) TABS   Yes No   Sig: Take 1 tablet by mouth   acetaminophen (TYLENOL) 500 MG tablet   Yes No   Sig: Take 1,000 mg by mouth every 6 hours as needed    bacitracin-neomycin-polymyxin (NEOSPORIN) 400-5-5000 external ointment   No No   Sig: Apply to open wounds daily after shower.   cetirizine (ZYRTEC) 10 MG tablet   Yes No   Sig: Take 10 " mg by mouth daily   cholecalciferol 25 MCG (1000 UT) TABS   Yes No   Sig: Take 2,000 Units by mouth daily   cyclobenzaprine (FLEXERIL) 10 MG tablet   Yes No   Sig: Take 10 mg by mouth   diphenhydrAMINE (BENADRYL) 25 MG capsule   Yes No   Sig: Take 25 mg by mouth every 6 hours as needed    doxepin (SINEQUAN) 50 MG capsule   Yes No   gabapentin (NEURONTIN) 300 MG capsule   Yes No   Sig: Take 900 mg by mouth daily   gabapentin (NEURONTIN) 300 MG capsule   Yes No   Sig: Take 900 mg by mouth   hydrOXYzine (VISTARIL) 25 MG capsule   Yes No   Sig: Take 25-50 mg by mouth as needed   hydrocortisone (CORTAID) 1 % external cream   Yes No   Sig: Apply topically daily   ibuprofen (ADVIL/MOTRIN) 600 MG tablet   Yes No   Sig: Take 600 mg by mouth as needed   ketoconazole (NIZORAL) 2 % external cream   Yes No   Sig: Apply topically daily   lidocaine (XYLOCAINE) 5 % ointment   No No   Sig: Apply  topically daily. Apply a pea size amount to ulcer when changing the dressing on the wound.   Patient not taking: Reported on 5/19/2021   lisinopril-hydrochlorothiazide (ZESTORETIC) 20-25 MG tablet   Yes No   Sig: Take 1 tablet by mouth once daily   mirtazapine (REMERON) 30 MG tablet   Yes No   Sig: Take 30 mg by mouth At Bedtime    naproxen sodium (ALEVE) 220 MG capsule   Yes No   Sig: Take 220 mg by mouth 2 times daily (with meals).   omeprazole (PRILOSEC) 20 MG DR capsule   Yes No   Sig: Take by mouth daily   oxyCODONE IR (ROXICODONE) 10 MG tablet   Yes No   Sig: Take 1 tablet by mouth every 4 hours as needed.   triamcinolone (KENALOG) 0.025 % cream   Yes No      Facility-Administered Medications: None          Allergy:     Allergies   Allergen Reactions     Meperidine Other (See Comments)     Blood-Group Specific Substance Other (See Comments)     Patient has Anti- big E, big C, JKb, warm auto, HTLA, low frequency antibodies. Blood products may be delayed. Draw patient 24 hours prior to transfusion. Draw one red top and two purple top  tubes for all type and screen orders.     Morphine Sulfate           Inpatient Medications:   Scheduled Meds:    sodium chloride (PF)  3 mL Intracatheter Q8H     PRN Meds: acetaminophen **OR** acetaminophen, bisacodyl, lidocaine 4%, lidocaine (buffered or not buffered), LORazepam, melatonin, naloxone **OR** naloxone **OR** naloxone **OR** naloxone, ondansetron **OR** ondansetron, oxyCODONE, polyethylene glycol, sodium chloride (PF)        Review of Systems    The Review of Systems is negative other than noted in the HPI  A comprehensive review of  10 systems was performed and found to be negative except as described in this note  CONSTITUTIONAL: negative for fever, chills, change in weight  INTEGUMENTARY/SKIN: no rash or obvious new lesions  ENT/MOUTH: no sore throat, new sinus pain or nasal drainage, no neck mass noted  RESP: No pleuretic pain, No cough, no hemoptysis, No SOB   CV: negative for chest pain, palpitations or peripheral edema  GI: no nausea, vomiting, change in stools  : no dysuria or hematuria  MUSCULOSKELETAL: no myalgias, arthralgias or join efffusion  ENDOCRINE: no history of polyuria, polydyspsia or symptoms of thyroid dysfunction  PSYCHIATRIC: no change in mood stable  LYMPHATIC: no new lymphadenopathy  HEME: no bleeding or easy bruisability  NEURO: see HPI       Physical Exam:   Physical Exam   Vitals:  Height:Data Unavailable  Weight:189 lbs 3.2 oz   Temp: 98.1  F (36.7  C) Temp src: Oral BP: 105/70 Pulse: 62   Resp: 16 SpO2: 97 % O2 Device: None (Room air) Oxygen Delivery: 1 LPM  General Appearance:  No acute distress  Neuro:       Mental Status Exam:    Is alert and oriented x3.  His mental status exam is normal.  He does not have any speech or language deficits.       Cranial Nerves:   Visual fields are normal bilaterally pupils are equal round reacting to light extraocular movements are full without any nystagmus there is no sensory loss of the face no weakness of the jaw no facial  asymmetry no palatal weakness tongue is midline.  No weakness of the neck flexors sternomastoid and shoulder shrug are normal bilaterally           Motor:   There are no motor deficits.  Strength is 5/5 and symmetric except for his left knee which is status post arthroplasty and is deformed and he cannot give me good effort as well as of the left ankle because of the shoulders and poor circulation.           Reflexes: Depressed bilaterally       Sensory: Not reliable but seems to be normal                   Coordination:   No incoordination both upper and lower extremities       Gait: Not tested as he is bedbound         Data:   ROUTINE IP LABS   CBC RESULTS:     Recent Labs   Lab 07/29/21 0517 07/28/21 2138   WBC 4.5 3.2*   RBC 3.57* 3.87*   HGB 10.6* 11.3*   HCT 31.5* 34.1*    191     Basic Metabolic Panel:   Recent Labs   Lab Test 07/29/21 0517 07/28/21 2137 02/04/21  1218    141 143   POTASSIUM 3.8  3.8 3.3* 3.8   CHLORIDE 114* 112* 112*   CO2 24 25 26   BUN 16 21 16   CR 1.20 1.68* 0.98   GLC 95 122* 72   PHAN 8.5 8.9 8.8     Liver panel:  Recent Labs   Lab Test 07/29/21 0517 07/28/21 2137 02/04/21  1218   PROTTOTAL 6.2* 6.9 7.3   ALBUMIN 2.8* 3.1* 3.7   BILITOTAL 0.3 0.3 0.3   ALKPHOS 81 75 101   AST 17 17 22   ALT 16 19 19     INR:  Recent Labs   Lab Test 07/28/21 2137   INR 1.17*      Lipid Profile:No lab results found.  Thyroid Panel:No lab results found.   Vitamin B12: No lab results found.   Vitamin D level: No lab results found.  A1C: No lab results found.  Troponin I: No lab results found.  CRP inflammation:   Recent Labs   Lab Test 02/04/21  1218   CRP <2.9     ESR:   Recent Labs   Lab Test 02/04/21  1218   SED 10       HECTOR: No lab results found.    ANCA: No lab results found.     EEG was reviewed by myself and showed generalized slowing without any evidence of any ongoing seizures.       IMAGING:   All imaging studies were reviewed personally  CT head: Done on 7/28/2021  --1.  No  "acute hemorrhage, large territory infarct, mass, mass effect or abnormal extra-axial fluid collection.  2.  Small focal hypodensities involving the right genu of internal capsule and left paramedian putamen. These findings may reflect prominent perivascular spaces versus age-indeterminate lacunar type infarctions.  3.  Small region of encephalomalacia involving the left inferior frontal lobe, likely reflecting a sequela of remote injury.  4.  Chronic senescent and presumed microvascular ischemic changes, as above.\"                        "

## 2021-07-29 NOTE — ED NOTES
After completion of initial fluid bolus, pt BP found to be in upper 70's. Verbal order given for repeat fluid bolus

## 2021-07-29 NOTE — ED NOTES
Band-aids on wounds over left foot and ankle removed, wounds redressed with adaptik and absorbant gauze.

## 2021-07-29 NOTE — ED NOTES
Pt still not responsive to voice. Responsive to painful stimuli but unable to perform any intentional movements.

## 2021-07-29 NOTE — ED NOTES
Bed: ST  Expected date:   Expected time:   Means of arrival:   Comments:  HEMS 434 AMS hypotension, high flow O2

## 2021-07-29 NOTE — PLAN OF CARE
OBS status. Disoriented to situation and place, calm, inconsistently follows, frustrated with cares at times. Tele SB with Sinus Arrhythmia. VSS ex Zander HR 49-60s. SBP>140 at times. Neuros ex. RUE flexion, drift and 4/5 weakness due to R shoulder tenderness. LLE knee with +2 edema and tenderness, elevated, pt declined ice. Garbled speech due to missing teeth. Pt declined pupil assessment. L foot and ankle dressings reinforced, elevated. Voiding in urinal. IVF NS 100ml/hr. Generalized pain managed with PRN Tylenol given x1. Not OOB, encourage turns, pt turns side to side independently. Tolerating regular diet, mechanical soft diet encouraged. K+ recheck in for tomorrow and Mg replacement ordered. Plan for SW, WOC, and Neurology consults today. Plan for MRI , ECHO and possible EEG today.

## 2021-07-29 NOTE — PROGRESS NOTES
Observation goals PRIOR TO DISCHARGE     Comments:   -diagnostic tests and consults completed and resulted: Not met  -vital signs normal or at patient baseline: Not met   -tolerating oral intake to maintain hydration: Not Met  -adequate pain control on oral analgesics: Not met   -returns to baseline functional status: Not met  -safe disposition plan has been identified: Not met

## 2021-07-29 NOTE — CONSULTS
Care Management Initial Consult    General Information  Assessment completed with: Family, Pt's wifeKaykay  (Kaykay, [phone])  Type of CM/SW Visit: Initial Assessment    Primary Care Provider verified and updated as needed: No (No PCP)   Readmission within the last 30 days: no previous admission in last 30 days (Chart review displays other admissions to [facility])      Reason for Consult: discharge planning  Advance Care Planning: Advance Care Planning Reviewed: other (comment) (Pt's wife reports no docs)          Communication Assessment  Patient's communication style: spoken language (English or Bilingual)             Cognitive  Cognitive/Neuro/Behavioral: .WDL except  Level of Consciousness: confused  Arousal Level: opens eyes spontaneously  Orientation: disoriented to, situation, place  Mood/Behavior: cooperative     Speech: spontaneous, garbled (missing teeth)    Living Environment:   People in home: spouse, other (see comments) (Pt lives with wife and roommates)     Current living Arrangements: apartment      Able to return to prior arrangements:         Family/Social Support:  Care provided by: self  Provides care for: no one  Marital Status:   Wife, Other (specify) (roommates )  Kaykay        Description of Support System: Involved, Supportive         Current Resources:   Patient receiving home care services: No     Community Resources: PCA,  (1.5 hours of PCA a day)  Equipment currently used at home: walker, rolling  Supplies currently used at home: None    Employment/Financial:  Employment Status: disabled     Employment/ Comments: No  status   Financial Concerns: No concerns identified   Referral to Financial Counselor: No       Lifestyle & Psychosocial Needs:  Social Determinants of Health     Tobacco Use: High Risk     Smoking Tobacco Use: Current Every Day Smoker     Smokeless Tobacco Use: Never Used   Alcohol Use: Not At Risk     Frequency of  Alcohol Consumption: Never     Average Number of Drinks: Not on file     Frequency of Binge Drinking: Not on file   Financial Resource Strain:      Difficulty of Paying Living Expenses:    Food Insecurity:      Worried About Running Out of Food in the Last Year:      Ran Out of Food in the Last Year:    Transportation Needs:      Lack of Transportation (Medical):      Lack of Transportation (Non-Medical):    Physical Activity:      Days of Exercise per Week:      Minutes of Exercise per Session:    Stress:      Feeling of Stress :    Social Connections:      Frequency of Communication with Friends and Family:      Frequency of Social Gatherings with Friends and Family:      Attends Jew Services:      Active Member of Clubs or Organizations:      Attends Club or Organization Meetings:      Marital Status:    Intimate Partner Violence:      Fear of Current or Ex-Partner:      Emotionally Abused:      Physically Abused:      Sexually Abused:    Depression: At risk     PHQ-2 Score: 5   Housing Stability:      Unable to Pay for Housing in the Last Year:      Number of Places Lived in the Last Year:      Unstable Housing in the Last Year:        Functional Status:  Prior to admission patient needed assistance:              Mental Health Status:          Chemical Dependency Status:                Values/Beliefs:  Spiritual, Cultural Beliefs, Jew Practices, Values that affect care: no               Additional Information:  MADAN called pt's wife, Kaykay, at [phone]. Kaykay reports herself and pt live were her friends in an apartment. Pt received 1.5 hours a day of PCA and has a  through [employer]. Pt has a 4WW with a seat. Kaykay notes pt is on SSDI. MADAN explained that patient is under observation status and how the billing differs from when a patient is categorized as inpatient. MADAN informed Kaykay they assist with discharge planning and can continue to consult with her. Kaykay was  agreeable to this and reports she is the main contact for pt. Kaykay also noted pt's sister Beatrice as a strong support in his life. SW has called the number for [facility]rales and was informed it was a wrong number. SW to continue to follow.    Candelario Chew, MARTIN

## 2021-07-29 NOTE — PLAN OF CARE
[id number]: Disoriented to situation, place and sometimes time, forgetful. Confused but calm and cooperative. VSS on RA. Missing teeth. Pain controlled by Oxycodone. Tele is Sinus arrhythmias and NSR, monitoring. Pulses present with doppler, weak dorsal pulses. Edema in R knee. Neuros intact ex. RUE drift and weakness. Patient reports pain in shoulder, most likely indication for drift and weakness. LS diminished. BS+, BM-, flatus+. Tolerating regular diet. Denies N/V. Voiding adequately. Up with assist x1. Encouraged repos q2hrs. WOC nurse dressed LLE foot and ankle wounds, dressing CDI. EEG and ECHO done today. Attempted made to reach family in order to perform MRI checklist, unable to reach, no call back.

## 2021-07-29 NOTE — PROCEDURES
EEG [facility]     This is a 18 channel spontaneous EEG recording.  The EKG was monitored throughout the record and shows normal sinus rhythm.    The background consists of 6 to 7 Hz low-voltage theta which is intermixed with fast beta activity and does not show any reactivity.  Anterior dominant rhythm is 18 to 24 Hz beta activity intermixed with low voltage theta    There are no sharp waves, spikes, focal slowing or epileptiform activity seen during the recording.    Photic stimulation was unremarkable.    Result    Mildly abnormal EEG by virtue of generalized slowing of the background.    Comments    Generalized slowing of the background can be seen in diffuse brain dysfunction such as encephalopathic states.  There is no evidence of any ongoing seizures.    Kimani Harrison MD  Neurologist  [facility] Neu[facility]  Office [phone]

## 2021-07-29 NOTE — H&P
[facility]    History and Physical  Hospitalist       Date of Admission:  7/28/2021  Date of Service (when I saw the patient): 07/28/21    ASSESSMENT  Casey Martinez is a 65 year old gentleman with past medical history that is most notable for PE, prior GI bleeding, and chronic venous lower extremity ulcer wounds, as well as chronic Hepatitis C, among others; who presents with convulsive syncope with ongoing acute metabolic encephalopathy.     PLAN    Convulsive syncope with ongoing acute metabolic encephalopathy: Cause unclear. He could have had a seizure and now with post-ictal confusion. I have reviewed his records in care Everywhere and as yet am unable to find any prior history of seizures. It is unknown how much if any recent alcohol he may have had. His Head CT tonight shows possible age indeterminate cerebral infarcts and we should rule out stroke if possible. He was hypotensive initially but that seems to have resolved with IV fluid. He has leukopenia. We will rule out infectious causes of illness as able. He has chronic lower extremity ulcer wounds which do not currently appear infected. His COVID test was negative and UA was negative for UTI, CXR negative for pneumonia, and UDS showed only cannabinoids, making illicit ingestion of narcotics or cocaine less likely, though some prior notes document prior use of cocaine. He is on a number of medications as below that could lead to somnolence.     -- Observation. NPO. Seizure precautions with prn Ativan ordered. q 4 neuro checks. MRI Brain and EEG ordered, and Neurology consulted     -- Telemetry. TTE ordered to rule out cardiogenic causes of syncope.  Orthostatics ordered.    Possible DASH: Cr 1.68 and was 0.98 in 2/2021. Could be hypovolemic.    -- Reassess after IV fluid given overnight (100 ml/hour NS)    Chronic pain: Hold neurontin, remeron, flexeril, benadryl, doxepin prn Oxycodone for now pending recovery of  consciousness    Chronic anemia: Noting he has history of prior GI bleeding due to perforated ulcer reportedly. It seems he has had Billroth II surgery. The full details of all of this are not as yet known to me. Currently HGB is 11.3 and was 12.4 in 2//2021.    -- Monitor while hospitalized, resume PPI when verified    PE/DVT: Unclear if he is on Xarelto at present given above bleeding in the past. Reportedly he has an IVC Filter in place.    -- telemetry ordered    Hypertension: Hold Zestoretic for DASH    Hypokalemia: Replacing. Check Magnesium and replace as well.    Chronic lower extremity venus ulcerations: WOC consulted    Rule Out COVID-19 infection  This patient was evaluated during a global COVID-19 pandemic, which necessitated consideration that the patient might be at risk for infection with the SARS-CoV-2 virus that causes COVID-19. Applicable protocols for evaluation were followed during the patient's care. Low suspicion for infection.   -negative COVID-19 PCR test result  -no current indication for precautions    Chief Complaint   Unresponsiveness    Unable to obtain a history from the patient due to confusion; history provided by the ED physician whom I have spoken with    History of Present Illness   Casey Martinez is a 65 year old gentleman who presents with acute unresponsiveness. He remains very somnolent now on interview several hours after the event, reportedly witnessed by a friend who told EMS when they arrived at his facility that he saw him suddenly slump over his kitchen chair; he was helped to the ground and convulsed for about 5 minutes. EMS found him lethargic with pin point pupils but Narcan administration did not change his condition. Further corroborating history can not yet be obtained.    In the ED, Blood pressure 112/75, pulse 65, temperature 98  F (36.7  C), temperature source Oral, resp. rate 9, weight 85.8 kg (189 lb 3.2 oz), SpO2 99 %.    Initial BP in the ED was 82/61,  "improved with IV fluid, now 121/79.    CBC and CMP were notable for WBC 3.2, HGB 11.3, K 3.3, Cl 112, BUN 21, Cr 1.68, alb 3.1, glucose 122, otherwise were within the normal reference range. Lactate was 1.0 (drawn several hours after arrival). INR was 1.17. Troponin negative. EKG showed sinus arrhythmia. Ethanol level was negative. UA was negative. UDS positive only for cannabinoids. UA was negative. COVID was negative. Blood cultures were sent.     CT Head showed: \"IMPRESSION:  1.  No acute hemorrhage, large territory infarct, mass, mass effect or abnormal extra-axial fluid collection.  2.  Small focal hypodensities involving the right genu of internal capsule and left paramedian putamen. These findings may reflect prominent perivascular spaces versus age-indeterminate lacunar type infarctions.  3.  Small region of encephalomalacia involving the left inferior frontal lobe, likely reflecting a sequela of remote injury.  4.  Chronic senescent and presumed microvascular ischemic changes, as above.\"    CXR showed: \"IMPRESSION: No airspace consolidation, pneumothorax, or pleural effusion.\"    PHYSICAL EXAM  Blood pressure (!) 136/90, pulse 63, temperature 98  F (36.7  C), temperature source Oral, resp. rate 20, weight 85.8 kg (189 lb 3.2 oz), SpO2 100 %.  Constitutional: Somnolent but arousable; no apparent distress  HEENT: poor dentition, moist mucus membranes  Respiratory: lungs clear to auscultation bilaterally  Cardiovascular: regular S1 S2  GI: abdomen soft non tender non distended bowel sounds positive  Lymph/Hematologic: pale, no cervical lymphadenopathy  Skin: no rash, good turgor  Musculoskeletal: no clubbing, cyanosis or edema; left foot wounds wrapped, appear c/d/i  Neurologic: extra-ocular muscles intact; moves all four extremities  Psychiatric: GCS 10, limited insight and judgment at present     DVT Prophylaxis: Pneumatic Compression Devices  Code Status: Full Code presumed    Disposition: Expected " "discharge in 2-3 days    Laurent Lu MD    Past Medical History    I have reviewed this patient's medical history and updated it with pertinent information if needed.   Past Medical History:   Diagnosis Date     Anemia      Chronic pain      Cocaine abuse (H)      DVT (deep venous thrombosis) (H)      GI bleed      Gout      Hepatitis C      HTN (hypertension)      Infection of total knee replacement (H)     MRSA     Pancreatitis      Positive antinuclear antibody      PUD (peptic ulcer disease)      Pulmonary embolism (H)     Status post IVC Filter placement     Venous ulcer (H)     in bilateral lower extremities       Past Surgical History   I have reviewed this patient's surgical history and updated it with pertinent information if needed.  Past Surgical History:   Procedure Laterality Date     DE LAPAROSCOPIC BILLROTH II GASTROJEJUNOSTOMY       REPLACEMENT TOTAL KNEE         Prior to Admission Medications   Prior to Admission Medications   Prescriptions Last Dose Informant Patient Reported? Taking?   Ferrous Sulfate 324 MG TBEC   Yes No   Sig: Take 324 mg by mouth daily   Gauze Pads & Dressings (KERLIX BANDAGE ROLL 4.5\"X9.3') MISC   No No   Si Piece daily   Gauze Pads & Dressings 4-1/2\"X4-1/2\" PADS   No No   Si Pieces daily   Specialty Vitamins Products (VITAMINS FOR HAIR) TABS   Yes No   Sig: Take 1 tablet by mouth   acetaminophen (TYLENOL) 500 MG tablet   Yes No   Sig: Take 1,000 mg by mouth every 6 hours as needed    bacitracin-neomycin-polymyxin (NEOSPORIN) 400-5-5000 external ointment   No No   Sig: Apply to open wounds daily after shower.   cetirizine (ZYRTEC) 10 MG tablet   Yes No   Sig: Take 10 mg by mouth daily   cholecalciferol 25 MCG (1000 UT) TABS   Yes No   Sig: Take 2,000 Units by mouth daily   cyclobenzaprine (FLEXERIL) 10 MG tablet   Yes No   Sig: Take 10 mg by mouth   diphenhydrAMINE (BENADRYL) 25 MG capsule   Yes No   Sig: Take 25 mg by mouth every 6 hours as needed  "   doxepin (SINEQUAN) 50 MG capsule   Yes No   gabapentin (NEURONTIN) 300 MG capsule   Yes No   Sig: Take 900 mg by mouth daily   gabapentin (NEURONTIN) 300 MG capsule   Yes No   Sig: Take 900 mg by mouth   hydrOXYzine (VISTARIL) 25 MG capsule   Yes No   Sig: Take 25-50 mg by mouth as needed   hydrocortisone (CORTAID) 1 % external cream   Yes No   Sig: Apply topically daily   ibuprofen (ADVIL/MOTRIN) 600 MG tablet   Yes No   Sig: Take 600 mg by mouth as needed   ketoconazole (NIZORAL) 2 % external cream   Yes No   Sig: Apply topically daily   lidocaine (XYLOCAINE) 5 % ointment   No No   Sig: Apply  topically daily. Apply a pea size amount to ulcer when changing the dressing on the wound.   Patient not taking: Reported on 5/19/2021   lisinopril-hydrochlorothiazide (ZESTORETIC) 20-25 MG tablet   Yes No   Sig: Take 1 tablet by mouth once daily   mirtazapine (REMERON) 30 MG tablet   Yes No   Sig: Take 30 mg by mouth At Bedtime    naproxen sodium (ALEVE) 220 MG capsule   Yes No   Sig: Take 220 mg by mouth 2 times daily (with meals).   omeprazole (PRILOSEC) 20 MG DR capsule   Yes No   Sig: Take by mouth daily   oxyCODONE IR (ROXICODONE) 10 MG tablet   Yes No   Sig: Take 1 tablet by mouth every 4 hours as needed.   triamcinolone (KENALOG) 0.025 % cream   Yes No      Facility-Administered Medications: None     Allergies   Allergies   Allergen Reactions     Meperidine Other (See Comments)     Blood-Group Specific Substance Other (See Comments)     Patient has Anti- big E, big C, JKb, warm auto, HTLA, low frequency antibodies. Blood products may be delayed. Draw patient 24 hours prior to transfusion. Draw one red top and two purple top tubes for all type and screen orders.     Morphine Sulfate        Social History   I have reviewed this patient's social history and updated it with pertinent information if needed. Casey Martinez  reports that he has been smoking. He has been smoking about 0.25 packs per day. He has never used  smokeless tobacco. He reports current alcohol use. He reports current drug use.    Family History   Family history assessed and, except as above, is non-contributory.    Family History   Problem Relation Age of Onset     Colon Cancer Brother        Review of Systems   The 10 point Review of Systems is negative other than noted in the HPI or here.     Primary Care Physician   Physician No Ref-Primary    Data   Labs Ordered and Resulted from Time of ED Arrival Up to the Time of Departure from the ED   INR - Abnormal; Notable for the following components:       Result Value    INR 1.17 (*)     All other components within normal limits   COMPREHENSIVE METABOLIC PANEL - Abnormal; Notable for the following components:    Potassium 3.3 (*)     Chloride 112 (*)     Creatinine 1.68 (*)     Glucose 122 (*)     Albumin 3.1 (*)     GFR Estimate 42 (*)     All other components within normal limits   ROUTINE UA WITH MICROSCOPIC REFLEX TO CULTURE - Abnormal; Notable for the following components:    RBC Urine 4 (*)     All other components within normal limits    Narrative:     Urine Culture not indicated   CBC WITH PLATELETS AND DIFFERENTIAL - Abnormal; Notable for the following components:    WBC Count 3.2 (*)     RBC Count 3.87 (*)     Hemoglobin 11.3 (*)     Hematocrit 34.1 (*)     RDW 16.4 (*)     All other components within normal limits   TROPONIN I - Normal   ETHYL ALCOHOL LEVEL - Normal   MAGNESIUM - Normal   EXTRA RED TOP TUBE   EXTRA GREEN TOP (LITHIUM HEPARIN) ON ICE   DRUG ABUSE SCREEN 77 URINE (FL, RH, SH)   EXTRA RED TOP TUBE   EXTRA BLOOD BANK PURPLE TOP TUBE   EXTRA BLOOD BANK PURPLE TOP TUBE   SARS-COV2 (COVID-19) VIRUS RT-PCR   LACTIC ACID WHOLE BLOOD   PERIPHERAL IV CATHETER   TYPE AND SCREEN, ADULT   BLOOD CULTURE   BLOOD CULTURE   COVID-19 VIRUS (CORONAVIRUS) BY PCR    Narrative:     The following orders were created for panel order Asymptomatic COVID-19 Virus (Coronavirus) by PCR Nasopharyngeal.  Procedure                                Abnormality         Status                     ---------                               -----------         ------                     SARS-COV2 (COVID-19) Vir...[[id number]]                      In process                   Please view results for these tests on the individual orders.   BLOOD CULTURE   BLOOD CULTURE   CBC WITH PLATELETS & DIFFERENTIAL    Narrative:     The following orders were created for panel order CBC with platelets differential.  Procedure                               Abnormality         Status                     ---------                               -----------         ------                     CBC with platelets and d...[[id number]]  Abnormal            Final result                 Please view results for these tests on the individual orders.   EXTRA TUBE    Narrative:     The following orders were created for panel order [facility] Draw.  Procedure                               Abnormality         Status                     ---------                               -----------         ------                     Extra Red Top Tube[[id number]]                               Final result                 Please view results for these tests on the individual orders.   ABO/RH TYPE AND SCREEN    Narrative:     The following orders were created for panel order ABO/Rh type and screen.  Procedure                               Abnormality         Status                     ---------                               -----------         ------                     Adult Type and Screen[[id number]]                            In process                   Please view results for these tests on the individual orders.   EXTRA TUBE    Narrative:     The following orders were created for panel order [facility] Draw.  Procedure                               Abnormality         Status                     ---------                               -----------         ------                     Extra  Red Top Tube[[id number]]                               In process                 Extra Blood Bank Purple ...[[id number]]                      In process                 Extra Blood Bank Purple ...[[id number]]                      In process                 Extra Green Top (Lithium...[[id number]]                      Final result                 Please view results for these tests on the individual orders.       Data reviewed today:  I personally reviewed the EKG tracing showing sinus arrythmia.    Recent Results (from the past 24 hour(s))   XR Chest Port 1 View    Narrative    CHEST ONE VIEW PORTABLE   7/28/2021 9:42 PM     HISTORY: AMS, hypoxic.    COMPARISON: None.      Impression    IMPRESSION: No airspace consolidation, pneumothorax, or pleural  effusion.    VERITO GUERRERO MD         SYSTEM ID:  [id number]   CT Head w/o Contrast    Narrative    EXAM: CT HEAD W/O CONTRAST  LOCATION: [facility]  DATE/TIME: 7/28/2021 9:41 PM    INDICATION: Seizure. Prolonged postictal phase. Altered mental status. Confusion.  COMPARISON: None.  TECHNIQUE: Routine CT Head without IV contrast. Multiplanar reformats. Dose reduction techniques were used.    FINDINGS:  INTRACRANIAL CONTENTS: No intracranial hemorrhage, extraaxial collection, or mass effect. There is a small region of encephalomalacia involving the left inferior frontal lobe, likely reflecting a sequela of remote injury. Small focal hypodensities are   identified involving the right genu of the internal capsule and left paramedian putamen. Mild presumed chronic small vessel ischemic changes. Mild generalized volume loss. No hydrocephalus.     VISUALIZED ORBITS/SINUSES/MASTOIDS: No intraorbital abnormality. No paranasal sinus mucosal disease. No middle ear or mastoid effusion.    BONES/SOFT TISSUES: No acute abnormality.      Impression    IMPRESSION:  1.  No acute hemorrhage, large territory infarct, mass, mass effect or abnormal extra-axial fluid  collection.  2.  Small focal hypodensities involving the right genu of internal capsule and left paramedian putamen. These findings may reflect prominent perivascular spaces versus age-indeterminate lacunar type infarctions.  3.  Small region of encephalomalacia involving the left inferior frontal lobe, likely reflecting a sequela of remote injury.  4.  Chronic senescent and presumed microvascular ischemic changes, as above.

## 2021-07-29 NOTE — ED TRIAGE NOTES
Arrives via EMS for eval after witnessed seizure. Witnessed by roommates. Hypotensive upon arrival. Pt arouses to painful stimuli only.

## 2021-07-29 NOTE — ED PROVIDER NOTES
History   Chief Complaint:  Altered mental status    The history is provided by the EMS personnel. History limited by: unresponsive.      Casey Martinez is a 65 year old male with history of acute kidney injury, pulmonary embolism, DVT, venous insufficiency, and seizures who presents with altered mental status. Per EMS report, the patient was found by his roommates in a pre-seizure aura and they were able to help him to the floor, after which he apparently had seizure-like activity. Upon EMS arrival, the patient was found to be in what they believed to be a post-ictal state and he was unresponsive. Fire department noted a heart rate of 120 upon first arrival. En route to the ED, EMS administered 1 mg Narcan and patient remained unresponsive. His blood pressure was 64/44 in both arms. He did have some flexion in his right arm when blood pressure was read. Blood sugar for EMS was 195.     Review of Systems   Unable to perform ROS: Patient unresponsive   Neurological: Positive for seizures.       Allergies:  Meperidine  Blood-Group specific substance  Morphine sulfate    Medications:  Roxicodone  Flexeril  Ferosul  Remeron  Sinequan  Zestoretic  Neurontin  Zyrtec  Benadryl  Neurontin  Prilosec  Sinequan  Colace    Past Medical History:    Acute kidney injury  Pulmonary embolism  DVT  Emphysema of lung  Encephalomalacia  Hypotension  Alcohol abuse  Acute gastrointesitnal hemorrhage.  Chronic hepatitis C  Tobacco abuse  Peptic ulcer disease  Prurigo nodularis  Hallux valgus  Venous insufficiency  Neurodermatitis  Venous ulcer of leg  Dysphagia     Past Surgical History:    Knee arthoplasty  Billroth procedure  IR visceral angio/embolization    Family History:    Cancer    Social History:  The patient presents via EMS by himself.    Physical Exam     Patient Vitals for the past 24 hrs:   BP Temp Temp src Pulse Resp SpO2 Weight   07/28/21 2300 112/75 -- -- 65 9 99 % --   07/28/21 2255 (!) 136/90 -- -- 63 20 100 % --    07/28/21 2250 (!) 144/87 -- -- 71 14 100 % --   07/28/21 2245 94/66 -- -- 73 9 100 % --   07/28/21 2240 91/61 -- -- 82 20 100 % --   07/28/21 2235 (!) 86/58 -- -- 65 27 100 % --   07/28/21 2230 90/62 -- -- 64 21 100 % --   07/28/21 2225 107/71 -- -- 70 10 98 % --   07/28/21 2220 (!) 133/90 -- -- 76 11 99 % --   07/28/21 2215 124/86 -- -- 63 10 100 % --   07/28/21 2210 94/65 -- -- 60 11 98 % --   07/28/21 2205 (!) 83/59 -- -- 66 11 98 % --   07/28/21 2200 (!) 77/58 -- -- 68 15 98 % --   07/28/21 2155 -- -- -- 64 11 98 % --   07/28/21 2150 91/70 -- -- -- -- 99 % --   07/28/21 2140 -- -- -- 63 11 100 % --   07/28/21 2135 97/83 -- -- 61 10 100 % --   07/28/21 2130 114/78 -- -- 64 11 93 % --   07/28/21 2125 101/77 98  F (36.7  C) Oral 68 13 100 % --   07/28/21 2120 93/65 -- -- 72 12 97 % --   07/28/21 2119 (!) 82/61 -- -- 73 17 99 % 85.8 kg (189 lb 3.2 oz)       Physical Exam  Nursing note and vitals reviewed.  Constitutional:  Minimally responsive to painful stimuli.   HENT:   Nose:    Nose normal.   Mouth/Throat:   Mucous membranes are normal.   Eyes:    Conjunctivae normal and EOM are normal.      Pupils are equal, round, and reactive to light.   Neck:    Trachea normal.   Cardiovascular:  Normal rate, regular rhythm, normal heart sounds and normal pulses. No murmur heard.  Pulmonary/Chest:  Effort normal and breath sounds normal.   Abdominal:   Soft. Normal appearance and bowel sounds are normal.      There is no tenderness.      There is no rebound and no CVA tenderness.   Musculoskeletal:  Extremities atraumatic x 4.   Lymphadenopathy:  No cervical adenopathy.   Neurological:   Minimally responsive but wakes up to painful stimuli.  Moving all extremities spontaneously.      No cranial nerve deficit.  Skin:    Ulcer to the left heel, but no surrounding erythema or bleeding present.  No rash noted.   Psychiatric:   Minimally responsive.      Emergency Department Course   ECG  ECG taken at 2119, ECG read at  2120  Sinus rhythm with marked sinus arrhythmia  Otherwise normal EKG   Rate 75 bpm. UT interval 124 ms. QRS duration 104 ms. QT/QTc 420/469 ms. P-R-T axes 65 80 77.       Imaging:    CT-scan Head w/o contrast:  1.  No acute hemorrhage, large territory infarct, mass, mass effect or abnormal extra-axial fluid collection.   2.  Small focal hypodensities involving the right genu of internal capsule and left paramedian putamen. These findings may reflect prominent perivascular spaces versus age-indeterminate lacunar type infarctions.   3.  Small region of encephalomalacia involving the left inferior frontal lobe, likely reflecting a sequela of remote injury.   4.  Chronic senescent and presumed microvascular ischemic changes, as above.   Result per radiology.      X-ray Chest Port, 1 view:  No airspace consolidation, pneumothorax, or pleural   effusion.   Result per radiology.     Laboratory:    CBC: WBC: 3.2 (L), HGB: 11.3 (L), PLT: 191  CMP: Glucose 122 (H), Potassium: 3.3 (L), Chloride: 112 (H), Creatinine: 1.68 (H), GFR: 42 (L), Albumin: 3.1 (L), o/w WNL  UA: RBC: 4 (H), o/w Negative  Troponin (Collected 2137): <0.015  INR: 1.17 (H)  ABO/Rh Type and Screen: B positive Antibody: Negative  Magnesium: 2.2  Alcohol ethyl: <0.01  Asymptomatic COVID19 Virus PCR by nasopharyngeal swab Negative      Lactic acid: Pending  Blood Cultures x2: Pending  Drug Abuse Screen Urine: Pending    Emergency Department Course:    Reviewed:    I reviewed the patient's nursing notes, vitals, past medical records, Care Everywhere.     Assessments:    2114: I performed an exam of the patient and obtained history, as documented above.    2232: I spoke with the patient's wife Kaykay regarding the patient.    2310: I rechecked the patient.     Consults:   2300: I spoke with Hospitalist Dr. Lu regarding the patient. They accept for care.    Interventions:  2209: NS 1L IV Bolus   2123: NS 1L IV Bolus   2312: Lactated ringers 125 mL/hr IV  infusion  2338: Lactated ringers 1000 mL IV Bolus     Disposition:  The patient was admitted to the hospital under the care of Dr. Lu.     Impression & Plan   Medical Decision Making:  This is a 65-year-old male brought in by EMS for altered mental status.  There was an initial report that he had a seizure, and therefore EMS felt that he might be postictal.  While I also considered that a possibility, he seemed to be altered for too long to be a postictal period.  I was concerned he might have an intracranial hemorrhage, or possibly have an infection and sepsis.  I also considered the possibility of drug use and/or alcohol use.  I proceeded with the above work-up including the blood work, chest x-ray, CT scan, and urine.  He was also provided IV fluids, which improved his blood pressure.  Unfortunately, his blood pressure would drop after the IV fluids were stopped.  Therefore he had continuous IV fluid administration.  His blood pressure seemed to respond appropriately.  I do not see any signs of an infection at this point, and he does not appear to have an intracranial hemorrhage.  I also doubt this is an acute stroke.  At this point, it is unclear exactly what might be causing this, but I am somewhat suspicious of might be related to drug or alcohol use and/or abuse.  I spoke with his wife briefly about him as well, and she indicated there is no history of seizures either, making that less likely the cause of his presentation.  I subsequently spoke with Dr. Lu, who will be admitting him.    Critical care (excluding procedures): 30 minutes    Covid-19  Casey Martinez was evaluated during a global COVID-19 pandemic, which necessitated consideration that the patient might be at risk for infection with the SARS-CoV-2 virus that causes COVID-19.   Applicable protocols for evaluation were followed during the patient's care.   COVID-19 was considered as part of the patient's evaluation. The plan for testing  is:  a test was obtained during this visit.    Diagnosis:    ICD-10-CM    1. Altered mental status, unspecified altered mental status type  R41.82    2. Hypotension, unspecified hypotension type  I95.9      Scribe Disclosure:  I, Juvencio Mcdonald, am serving as a scribe at 9:16 PM on 7/28/2021 to document services personally performed by Lambert Fuentes MD based on my observations and the provider's statements to me.          Lambert Fuentes MD  07/29/21 0011

## 2021-07-29 NOTE — PROVIDER NOTIFICATION
Paged Dr. Lu to clarify if patient will need to be IMC or not. If he does we need IMC orders. Per MD patient is waking up and does not need to be IMC.

## 2021-07-29 NOTE — ED NOTES
Pt is more easily arousable, able to answer questions appropriately, but continues to be quit sleepy. He does not recall what happened yesterday, states he does not have a history of seizures, endorses left knee pain and chronic wounds to the left foot. He adamantly denies alcohol or drug use.   Hospitalist notified that the patient may be more easily assessed at this time.

## 2021-07-29 NOTE — PROGRESS NOTES
[facility]  WO Nurse Inpatient Wound Assessment     Reason for consultation: Evaluate and treat Lt foot wounds       Assessment  BLE: NO Venous Stasis Ulcers noted  Lt lateral foot x 2  and Lt medial malleolus x 2 wounds: probable PAD and PI etiology, no local s/s infection but wounds appear stalled.     Pt was supposed to have appt @ [facility] wound clinic today.     Treatment Plan  Wound care: lt lateral foot and Lt medial malleolus   -change dressing on odd days and prn    -Clean wounds with VASHE. Need to scrub the Iodosorb Gel out of wound bed  -3M No Sting Skin Prep to brandee-wound skin . Let dry  -Dab Iodosorb gel to Pieces of Xero-form x 3. One for each wound site  -Place dressing (Iodosorb gel down) over each wound  -Cover each site with Mepilex border  --Date and time dressing       Orders  In Epic  Recommended provider order: NA  WO Nurse follow-up plan: weekly   Nursing to notify the Provider(s) and re-consult the WO Nurse if wound(s) deteriorates or new skin concern.    Patient History  According to provider note(s):    Assessment & Plan         Casey Martinez is a 65 year old gentleman with past medical history that is most notable for PE, prior GI bleeding, and chronic venous lower extremity ulcer wounds, as well as chronic Hepatitis C, among others; who presents with convulsive syncope with ongoing acute metabolic encephalopathy.      Convulsive syncope with ongoing acute metabolic encephalopathy  Cause unclear. He could have had a seizure and now with post-ictal confusion. I have reviewed his records in care Everywhere and as yet am unable to find any prior history of seizures. It is unknown how much if any recent alcohol he may have had. His Head CT tonight shows possible age indeterminate cerebral infarcts and we should rule out stroke if possible. He was hypotensive initially but that seems to have resolved with IV fluid. He has leukopenia. We will rule out infectious causes  of illness as able. He has chronic lower extremity ulcer wounds which do not currently appear infected. His COVID test was negative and UA was negative for UTI, CXR negative for pneumonia, and UDS showed only cannabinoids, making illicit ingestion of narcotics or cocaine less likely, though some prior notes document prior use of cocaine. He is on a number of medications as below that could lead to somnolence.      Possible DASH - improving  Cr 1.68 and was 0.98 in 2/2021. Could be hypovolemic.     Chronic pain  Hold neurontin, remeron, flexeril, benadryl, doxepin prn Oxycodone for now pending recovery of consciousness     Chronic anemia  Noting he has history of prior GI bleeding due to perforated ulcer reportedly. It seems he has had Billroth II surgery. The full details of all of this are not as yet known to me. Currently HGB is 11.3 and was 12.4 in 2//2021.     PE/DVT  Unclear if he is on Xarelto at present given above bleeding in the past. Reportedly he has an IVC Filter in place.  - telemetry ordered     Hypertension  Hold Zestoretic for DASH     Hypokalemia  Replacing. Check Magnesium and replace as well.     Chronic lower extremity venus ulcerations  WOC consulted      Objective Data  Containment of urine/stool: BRP and urinal @ bedside    Active Diet Order:  Orders Placed This Encounter      Advance Diet as Tolerated: Regular Diet Adult    Output:   I/O last 3 completed shifts:  In: 3270 [P.O.:100; I.V.:170; IV Piggyback:3000]  Out: 1025 [Urine:1025]    Risk Assessment:   Sensory Perception: 3-->slightly limited  Moisture: 4-->rarely moist  Activity: 3-->walks occasionally  Mobility: 3-->slightly limited  Nutrition: 2-->probably inadequate  Friction and Shear: 2-->potential problem  Kameron Score: 17                          Labs: Recent Labs   Lab 07/29/21  0517 07/28/21 2137   ALBUMIN 2.8* 3.1*   HGB 10.6*  --    INR  --  1.17*   WBC 4.5  --        Physical Exam  Skin inspection: BLE and Lt foot  wound  Unkown etiology to foot wounds. Appear PI with probable PAD component. No LE wounds noted today     #1 and #2: Wound Location: Lt lateral foot wounds x 2  Date of last photo: 7-29-21    #1:  Proximal wound         -Rounded 1.0cm x 1.0cm x unable to determine with 100% yellow adherent slough          -Tunneling: Unable to determine          -Undermining: Unable to determine          -Palpation of the wound bed: firm           -Periwound skin: intact, no erythema          -Color: consistent with surrounding skin           -Temperature: warm           -Drainage: light yellow, small           - Odor: none          -Pain: tenderness    #2:  Distal wound         -Rounded 2.0cm x 2.0cm x unable to determine with  30% breakthrough granulation buds/70% yellow adherent slough          -Tunneling: Unable to determine          -Undermining: Unable to determine          -Palpation of the wound bed: firm           -Periwound skin: intact, no erythema          -Color: consistent with surrounding skin           -Temperature: warm           -Drainage: light yellow, small           - Odor: none          -Pain: tenderness    #3: Wound Location: Lt medial malleolus wound    Date of last photo: 7-29-21             Triangle -shaped: 2.0cm x 2.0cm x unable to determine with  20% breakthrough granulation buds /80% yellow adherent slough          -Tunneling: Unable to determine          -Undermining: Unable to determine          -Palpation of the wound bed: firm           -Periwound skin: intact, no erythema          -Color: consistent with surrounding skin           -Temperature: warm           -Drainage: light yellow, small           - Odor: none          -Pain: tenderness      Interventions  Current support surface: atmos air  Current off-loading measures: Heel suspension on pillows  Visual inspection of wound(s) completed  Wound Care: completed  Supplies: @ bedside  Education provided: Discussed POC with patient and  treatement      Emmie Larios RN, CWOCN

## 2021-07-29 NOTE — PROGRESS NOTES
[facility]    Medicine Progress Note - Hospitalist Service       Date of Admission:  7/28/2021    Assessment & Plan         Casey Martinez is a 65 year old gentleman with past medical history that is most notable for PE, prior GI bleeding, and chronic venous lower extremity ulcer wounds, as well as chronic Hepatitis C, among others; who presents with convulsive syncope with ongoing acute metabolic encephalopathy.      Convulsive syncope with ongoing acute metabolic encephalopathy  Cause unclear. He could have had a seizure and now with post-ictal confusion. I have reviewed his records in care Everywhere and as yet am unable to find any prior history of seizures. It is unknown how much if any recent alcohol he may have had. His Head CT tonight shows possible age indeterminate cerebral infarcts and we should rule out stroke if possible. He was hypotensive initially but that seems to have resolved with IV fluid. He has leukopenia. We will rule out infectious causes of illness as able. He has chronic lower extremity ulcer wounds which do not currently appear infected. His COVID test was negative and UA was negative for UTI, CXR negative for pneumonia, and UDS showed only cannabinoids, making illicit ingestion of narcotics or cocaine less likely, though some prior notes document prior use of cocaine. He is on a number of medications as below that could lead to somnolence.   - Seizure precautions with prn Ativan ordered. q 4 neuro checks.   - MRI Brain and EEG pending  - Neurology consulted  - Telemetry.   - TTE ordered to rule out cardiogenic causes of syncope - EF normal, unremarkable  - Orthostatics ordered.     Possible DASH - improving  Cr 1.68 and was 0.98 in 2/2021. Could be hypovolemic.  - Reassess after IV fluid given last night (100 ml/hour NS)  - encourage PO intake   - creat 1.2 today   - Follow BMP     Chronic pain  Hold neurontin, remeron, flexeril, benadryl, doxepin prn Oxycodone  for now pending recovery of consciousness  - more awake and alert, unable to given med list confirmation however, providing prn oxy for now     Chronic anemia  Noting he has history of prior GI bleeding due to perforated ulcer reportedly. It seems he has had Billroth II surgery. The full details of all of this are not as yet known to me. Currently HGB is 11.3 and was 12.4 in 2//2021.  - Monitor while hospitalized, resume PPI for now - unable to verify     PE/DVT  Unclear if he is on Xarelto at present given above bleeding in the past. Reportedly he has an IVC Filter in place.  - telemetry ordered     Hypertension  Hold Zestoretic for DASH     Hypokalemia  Replacing. Check Magnesium and replace as well.     Chronic lower extremity venus ulcerations  WOC consulted     Rule Out COVID-19 infection  This patient was evaluated during a global COVID-19 pandemic, which necessitated consideration that the patient might be at risk for infection with the SARS-CoV-2 virus that causes COVID-19. Applicable protocols for evaluation were followed during the patient's care. Low suspicion for infection.   - negative COVID-19 PCR test result  - no current indication for precautions      Diet: Advance Diet as Tolerated: Regular Diet Adult    DVT Prophylaxis: Pneumatic Compression Devices  Zarco Catheter: Not present  Central Lines: None  Code Status: Full Code      Disposition Plan   Expected discharge: 07/30/2021 pending clinical course, work up, and neurology eval/recs     The patient's care was discussed with the Bedside Nurse and Patient.    Lalo Huitron MD  Hospitalist Service  [facility]  Securely message with the Vocera Web Console (learn more here)  Text page via [employer] Paging/Directory      Clinically Significant Risk Factors Present on Admission                   ______________________________________________________________________    Interval History   Seen and examined midday. No new complaints  "- continued mild pain in LLE - WOC consulted. No fevers or chills, does not recall events PTA. Denies history of seizure. EEG pending, neuro consulted.    Data reviewed today: I reviewed all medications, new labs and imaging results over the last 24 hours. I personally reviewed no images or EKG's today.    Physical Exam   Vital Signs: Temp: 98  F (36.7  C) Temp src: Oral BP: 139/86 Pulse: 71   Resp: 16 SpO2: 93 % O2 Device: None (Room air) Oxygen Delivery: 1 LPM  Weight: 189 lbs 3.2 oz    Gen: NAD, pleasant  HEENT: Normocephalic, EOMI, MMM  Resp: no crackles,  no wheezes, no increased work of resp  CV: S1S2 heard, reg rhythm, reg rate, no pitting pedal edema  Abdo: soft, nontender, nondistended, bowel sounds present  Ext: calves nontender, perfused, LLE wound is dressed, CDI  Neuro: AAOx self, year, \"Cristina\", CN grossly intact, no facial asymmetry      Data   Recent Labs   Lab 07/29/21  0517 07/28/21 2138 07/28/21 2137   WBC 4.5 3.2*  --    HGB 10.6* 11.3*  --    MCV 88 88  --     191  --    INR  --   --  1.17*     --  141   POTASSIUM 3.8  3.8  --  3.3*   CHLORIDE 114*  --  112*   CO2 24  --  25   BUN 16  --  21   CR 1.20  --  1.68*   ANIONGAP 4  --  4   PHAN 8.5  --  8.9   GLC 95  --  122*   ALBUMIN 2.8*  --  3.1*   PROTTOTAL 6.2*  --  6.9   BILITOTAL 0.3  --  0.3   ALKPHOS 81  --  75   ALT 16  --  19   AST 17  --  17   TROPONIN  --   --  <0.015     Recent Results (from the past 24 hour(s))   XR Chest Port 1 View    Narrative    CHEST ONE VIEW PORTABLE   7/28/2021 9:42 PM     HISTORY: AMS, hypoxic.    COMPARISON: None.      Impression    IMPRESSION: No airspace consolidation, pneumothorax, or pleural  effusion.    VERITO GUERRERO MD         SYSTEM ID:  [id number]   CT Head w/o Contrast    Narrative    EXAM: CT HEAD W/O CONTRAST  LOCATION: [facility]  DATE/TIME: 7/28/2021 9:41 PM    INDICATION: Seizure. Prolonged postictal phase. Altered mental status. Confusion.  COMPARISON: " None.  TECHNIQUE: Routine CT Head without IV contrast. Multiplanar reformats. Dose reduction techniques were used.    FINDINGS:  INTRACRANIAL CONTENTS: No intracranial hemorrhage, extraaxial collection, or mass effect. There is a small region of encephalomalacia involving the left inferior frontal lobe, likely reflecting a sequela of remote injury. Small focal hypodensities are   identified involving the right genu of the internal capsule and left paramedian putamen. Mild presumed chronic small vessel ischemic changes. Mild generalized volume loss. No hydrocephalus.     VISUALIZED ORBITS/SINUSES/MASTOIDS: No intraorbital abnormality. No paranasal sinus mucosal disease. No middle ear or mastoid effusion.    BONES/SOFT TISSUES: No acute abnormality.      Impression    IMPRESSION:  1.  No acute hemorrhage, large territory infarct, mass, mass effect or abnormal extra-axial fluid collection.  2.  Small focal hypodensities involving the right genu of internal capsule and left paramedian putamen. These findings may reflect prominent perivascular spaces versus age-indeterminate lacunar type infarctions.  3.  Small region of encephalomalacia involving the left inferior frontal lobe, likely reflecting a sequela of remote injury.  4.  Chronic senescent and presumed microvascular ischemic changes, as above.       Echocardiogram Complete   Result Value    LVEF  55-60%    Narrative    [id number]  [id number]  [id number]  [id number]^AMAURY^IAN^SARA     [facility]  Echocardiography Laboratory  [address] [place] [address]     Name: YOANDY GALVAN  MRN: [id number]  : 1955  Study Date: 2021 10:25 AM  Age: 65 yrs  Gender: Male  Patient Location: [facility]  Reason For Study: Syncope and Collapse  Ordering Physician: IAN REBOLLEDO  Referring Physician: IAN REBOLLEDO  Performed By: DELILAH Lao     BSA: 2.1 m2  Height: 72 in  Weight: 189 lb  HR: 68  BP: 106/74  mmHg  ______________________________________________________________________________  Procedure  Complete Portable Echo Adult.  ______________________________________________________________________________  Interpretation Summary     1. The left ventricle is normal in size. There is normal left ventricular wall  thickness. Left ventricular systolic function is normal. The visual ejection  fraction is 55-60%. Left ventricular diastolic function is normal. No regional  wall motion abnormalities noted.  2. The right ventricle is normal size. The right ventricular systolic function  is normal.  3. Trace mitral and tricuspid regurgitation.  4. No pericardial effusion.  5. No previous study for comparison.  ______________________________________________________________________________  Left Ventricle  The left ventricle is normal in size. There is normal left ventricular wall  thickness. Left ventricular systolic function is normal. The visual ejection  fraction is 55-60%. Left ventricular diastolic function is normal. No regional  wall motion abnormalities noted.     Right Ventricle  The right ventricle is normal size. The right ventricular systolic function is  normal.     Atria  The left atrium is mildly dilated. Right atrial size is normal. There is no  color Doppler evidence of an atrial shunt.     Mitral Valve  There is trace mitral regurgitation.     Tricuspid Valve  There is trace tricuspid regurgitation. Right ventricular systolic pressure  could not be approximated due to inadequate tricuspid regurgitation.     Aortic Valve  The aortic valve is not well visualized. There is trace aortic regurgitation.  No aortic stenosis is present.     Pulmonic Valve  There is no pulmonic valvular stenosis.     Vessels  The aortic root is normal size. Normal size ascending aorta. Descending aortic  velocity normal. Dilation of the inferior vena cava is present with normal  respiratory variation in diameter.      Pericardium  There is no pericardial effusion.     Rhythm  Sinus rhythm was noted.  ______________________________________________________________________________  MMode/2D Measurements & Calculations  IVSd: 1.0 cm     LVIDd: 5.3 cm  LVIDs: 3.4 cm  LVPWd: 1.1 cm  FS: 35.9 %  LV mass(C)d: 221.6 grams  LV mass(C)dI: 106.5 grams/m2  Ao root diam: 3.5 cm  asc Aorta Diam: 3.5 cm  LVOT diam: 2.2 cm  LVOT area: 3.9 cm2  LA Volume (BP): 75.7 ml  LA Volume Index (BP): 36.4 ml/m2     LA Volume Indexed (AL/bp): 37.3 ml/m2  RWT: 0.43     Doppler Measurements & Calculations  MV E max gilmer: 107.0 cm/sec  MV A max gilmer: 91.3 cm/sec  MV E/A: 1.2  MV dec time: 0.27 sec  PA acc time: 0.14 sec  Pulm Sys Gilmer: 66.5 cm/sec  Pulm Patterson Gilmer: 50.0 cm/sec  Pulm A Revs Gilmer: 37.3 cm/sec  Pulm S/D: 1.3  E/E' av.6  Lateral E/e': 8.1  Medial E/e': 9.1     ______________________________________________________________________________  Report approved by: Paulino Thakur 2021 11:21 AM

## 2021-07-29 NOTE — PROVIDER NOTIFICATION
"  Pt frequently requesting food, repeatedly saying \"I am hungry\", \"you can't keep me here, I'm going home, I'm hungry\". Disoriented to situation. Asked for pudding at least. Please advise.    Update: New orders for Clears, ADAT.   "

## 2021-07-29 NOTE — ED NOTES
Pt able to nod yes and no to questions. He does not open his eyes, and does not speak. When asked, he expresses he doesn't remember what happened today. Will not follow commands, unsure of pt's orientation or credibility at this time.

## 2021-07-30 ENCOUNTER — APPOINTMENT (OUTPATIENT)
Dept: MRI IMAGING | Facility: CLINIC | Age: 66
End: 2021-07-30
Attending: HOSPITALIST
Payer: COMMERCIAL

## 2021-07-30 LAB
ANION GAP SERPL CALCULATED.3IONS-SCNC: 2 MMOL/L (ref 3–14)
BUN SERPL-MCNC: 10 MG/DL (ref 7–30)
CALCIUM SERPL-MCNC: 8.5 MG/DL (ref 8.5–10.1)
CHLORIDE BLD-SCNC: 114 MMOL/L (ref 94–109)
CO2 SERPL-SCNC: 26 MMOL/L (ref 20–32)
CREAT SERPL-MCNC: 0.94 MG/DL (ref 0.66–1.25)
ERYTHROCYTE [DISTWIDTH] IN BLOOD BY AUTOMATED COUNT: 16 % (ref 10–15)
GFR SERPL CREATININE-BSD FRML MDRD: 85 ML/MIN/1.73M2
GLUCOSE BLD-MCNC: 81 MG/DL (ref 70–99)
HCT VFR BLD AUTO: 32.4 % (ref 40–53)
HGB BLD-MCNC: 10.8 G/DL (ref 13.3–17.7)
MAGNESIUM SERPL-MCNC: 1.8 MG/DL (ref 1.6–2.3)
MCH RBC QN AUTO: 29.3 PG (ref 26.5–33)
MCHC RBC AUTO-ENTMCNC: 33.3 G/DL (ref 31.5–36.5)
MCV RBC AUTO: 88 FL (ref 78–100)
PLATELET # BLD AUTO: 183 10E3/UL (ref 150–450)
POTASSIUM BLD-SCNC: 4.1 MMOL/L (ref 3.4–5.3)
RBC # BLD AUTO: 3.68 10E6/UL (ref 4.4–5.9)
SODIUM SERPL-SCNC: 142 MMOL/L (ref 133–144)
WBC # BLD AUTO: 3.1 10E3/UL (ref 4–11)

## 2021-07-30 PROCEDURE — 99225 PR SUBSEQUENT OBSERVATION CARE,LEVEL II: CPT | Performed by: HOSPITALIST

## 2021-07-30 PROCEDURE — 250N000013 HC RX MED GY IP 250 OP 250 PS 637: Performed by: HOSPITALIST

## 2021-07-30 PROCEDURE — 70553 MRI BRAIN STEM W/O & W/DYE: CPT

## 2021-07-30 PROCEDURE — 36415 COLL VENOUS BLD VENIPUNCTURE: CPT | Performed by: HOSPITALIST

## 2021-07-30 PROCEDURE — 80048 BASIC METABOLIC PNL TOTAL CA: CPT | Performed by: HOSPITALIST

## 2021-07-30 PROCEDURE — 83735 ASSAY OF MAGNESIUM: CPT | Performed by: HOSPITALIST

## 2021-07-30 PROCEDURE — A9585 GADOBUTROL INJECTION: HCPCS | Performed by: HOSPITALIST

## 2021-07-30 PROCEDURE — 85027 COMPLETE CBC AUTOMATED: CPT | Performed by: HOSPITALIST

## 2021-07-30 PROCEDURE — G0378 HOSPITAL OBSERVATION PER HR: HCPCS

## 2021-07-30 PROCEDURE — 255N000002 HC RX 255 OP 636: Performed by: HOSPITALIST

## 2021-07-30 RX ORDER — DOXEPIN HYDROCHLORIDE 25 MG/1
50 CAPSULE ORAL AT BEDTIME
Status: DISCONTINUED | OUTPATIENT
Start: 2021-07-30 | End: 2021-07-31 | Stop reason: HOSPADM

## 2021-07-30 RX ORDER — MIRTAZAPINE 7.5 MG/1
30 TABLET, FILM COATED ORAL AT BEDTIME
Status: DISCONTINUED | OUTPATIENT
Start: 2021-07-30 | End: 2021-07-31 | Stop reason: HOSPADM

## 2021-07-30 RX ORDER — GABAPENTIN 300 MG/1
900 CAPSULE ORAL 3 TIMES DAILY
Status: DISCONTINUED | OUTPATIENT
Start: 2021-07-30 | End: 2021-07-31 | Stop reason: HOSPADM

## 2021-07-30 RX ORDER — DIPHENHYDRAMINE HCL 25 MG
25 CAPSULE ORAL EVERY 6 HOURS PRN
Status: DISCONTINUED | OUTPATIENT
Start: 2021-07-30 | End: 2021-07-31 | Stop reason: HOSPADM

## 2021-07-30 RX ORDER — GADOBUTROL 604.72 MG/ML
8 INJECTION INTRAVENOUS ONCE
Status: COMPLETED | OUTPATIENT
Start: 2021-07-30 | End: 2021-07-30

## 2021-07-30 RX ADMIN — GABAPENTIN 900 MG: 300 CAPSULE ORAL at 10:17

## 2021-07-30 RX ADMIN — OXYCODONE HYDROCHLORIDE 5 MG: 5 TABLET ORAL at 10:17

## 2021-07-30 RX ADMIN — GADOBUTROL 8 ML: 604.72 INJECTION INTRAVENOUS at 12:57

## 2021-07-30 RX ADMIN — MIRTAZAPINE 30 MG: 7.5 TABLET, FILM COATED ORAL at 21:28

## 2021-07-30 RX ADMIN — GABAPENTIN 900 MG: 300 CAPSULE ORAL at 16:58

## 2021-07-30 RX ADMIN — DIPHENHYDRAMINE HYDROCHLORIDE 25 MG: 25 CAPSULE ORAL at 10:17

## 2021-07-30 RX ADMIN — DOXEPIN HYDROCHLORIDE 50 MG: 25 CAPSULE ORAL at 21:28

## 2021-07-30 RX ADMIN — OXYCODONE HYDROCHLORIDE 5 MG: 5 TABLET ORAL at 21:45

## 2021-07-30 RX ADMIN — OXYCODONE HYDROCHLORIDE 5 MG: 5 TABLET ORAL at 04:43

## 2021-07-30 RX ADMIN — ACETAMINOPHEN 650 MG: 325 TABLET, FILM COATED ORAL at 12:25

## 2021-07-30 RX ADMIN — OXYCODONE HYDROCHLORIDE 5 MG: 5 TABLET ORAL at 14:31

## 2021-07-30 RX ADMIN — GABAPENTIN 900 MG: 300 CAPSULE ORAL at 21:28

## 2021-07-30 NOTE — PROGRESS NOTES
1900h-0700h: Patient is alert & oriented x4. Denies headache or dizziness. Neuro intact. Tele: Sinus marilee. Denies symptoms. O2Sat 96% on RA. Diminished breath sound. Encouraged cough & deep breathing exercises. No arm drift noted. Stand by assist during ambulation to the bathroom using IV pole to support himself. L leg is weaker compared to left. L foot wound covered w/ mepilex, CDI. BLE both lona. Doppler pulses on BLE and w/ baseline numbness. Patient tolerating regular diet, -n/v. Voiding adequately, normoactive BS x4. Encouraged fluid intake, I&O monitored. Patient reported 8/10 pain on L foot, managed w/ oxycodone; somehow effective.

## 2021-07-30 NOTE — PLAN OF CARE
1900h-0700h: Patient is alert & oriented x4, logical. Denies headache or dizziness. Neuro intact. Tele: Sinus marilee. Denies symptoms. O2Sat 96% on RA. Diminished breath sound. Encouraged cough & deep breathing exercises. No arm drift noted. Stand by assist during ambulation to the bathroom using IV pole to support himself. L leg is weaker compared to left. L foot wound covered w/ mepilex, CDI. BLE both lona. Doppler pulses on BLE and w/ baseline numbness. Patient tolerating regular diet, -n/v. Voiding adequately, normoactive BS x4. Encouraged fluid intake, I&O monitored. Patient reported 8/10 pain on L foot, managed w/ oxycodone 2x; somehow effective.

## 2021-07-30 NOTE — PROVIDER NOTIFICATION
MD Notification    Notified Person: MD    Notified Person Name: Tomy    Notification Date/Time: 7/30/21 1869    Notification Interaction: Paged    Purpose of Notification: Patient has very itchy back, they are requesting Benadryl. Thank you!    Orders Received: Order placed.    Comments:

## 2021-07-30 NOTE — PROGRESS NOTES
Observation goals PRIOR TO DISCHARGE    Comments: -diagnostic tests and consults completed and resulted   -vital signs normal or at patient baseline -met  -tolerating oral intake to maintain hydration -met  -adequate pain control on oral analgesics -not met  -returns to baseline functional status -met  -safe disposition plan has been identified -not met  Nurse to notify provider when observation goals have been met and patient is ready for discharge.

## 2021-07-30 NOTE — PROGRESS NOTES
Brief hospitalist note      Patient feeling much improved from adm. No new issues. Awaiting MRI, orthostatic vitals, and PT evaluation. Otherwise, possible discharge later today. Discussed with RN.    Full note to follow.

## 2021-07-30 NOTE — PROGRESS NOTES
Observation goals PRIOR TO DISCHARGE    Comments:   -diagnostic tests and consults completed and resulted -not met  -vital signs normal or at patient baseline -met  -tolerating oral intake to maintain hydration -met  -adequate pain control on oral analgesics -not met  -returns to baseline functional status -met  -safe disposition plan has been identified -not met

## 2021-07-30 NOTE — PLAN OF CARE
7/30/21 9855-4575 OBS status.  A&Ox4, lethargic at times. VSS on RA, completed ortho static blood pressures. Independent in room. Neuros intact. Moderate/severe pain to LLE. 3 wound site to L foot CDI, WOC following. Regular diet. Continent, up to bathroom. WBC 3.1. On K & Mg protocol. PIV SL. Tele NSR. Skin scattered bruising, BLE trace edema and lona. CMS intact, LLE numbness in foot. Pulses present with doppler to BLE, weak. MRI completed this afternoon. PT to evaluate prior to discharge, plan to return back to home tomorrow.     1738 - Patient was unable to get ride set up. Will need SW to set up taxi ride tomorrow. MD aware. AVS printed and in front of chart. No new medications prescribed.

## 2021-07-30 NOTE — PROVIDER NOTIFICATION
MD Notification    Notified Person: MD     Notified Person Name: Tomy    Notification Date/Time: 7/30/21 4872    Notification Interaction: Paged    Purpose of Notification: Patient unable to set up ride themselves. Will discharge tomorrow and will need taxi ride. Sorry for the change!    Orders Received: NA    Comments:

## 2021-07-30 NOTE — PHARMACY-ADMISSION MEDICATION HISTORY
Pharmacy Medication History  Admission medication history interview status for the 7/28/2021  admission is complete. See EPIC admission navigator for prior to admission medications     Location of Interview: Patient room  Medication history sources: Patient, Surescripts and Care Everywhere    Significant changes made to the medication list:  Removed omeprazole    In the past week, patient estimated taking medication this percent of the time: 50-90% due to other; noncompliance     Additional medication history information:   This med list was done to the best of our ability through Surescripts and Care Everywhere.  Pt is not a good historian.      Medication reconciliation completed by provider prior to medication history? No    Time spent in this activity: 30 min    Prior to Admission medications    Medication Sig Last Dose Taking? Auth Provider   acetaminophen (TYLENOL) 500 MG tablet Take 1,000 mg by mouth every 6 hours as needed   at prn Yes Reported, Patient   bacitracin-neomycin-polymyxin (NEOSPORIN) 400-5-5000 external ointment Apply to open wounds daily after shower.  Yes Lucinda Enciso MD   cetirizine (ZYRTEC) 10 MG tablet Take 10 mg by mouth daily  Yes Reported, Patient   cholecalciferol 25 MCG (1000 UT) TABS Take 2,000 Units by mouth daily  Yes Reported, Patient   cyclobenzaprine (FLEXERIL) 10 MG tablet Take 10 mg by mouth 3 times daily as needed   Yes Reported, Patient   diclofenac (VOLTAREN) 1 % topical gel Apply 4 g topically 4 times daily  Yes Unknown, Entered By History   diphenhydrAMINE (BENADRYL) 25 MG capsule Take 25 mg by mouth every 6 hours as needed   Yes Reported, Patient   Ferrous Sulfate 324 MG TBEC Take 2 tablets by mouth 2 times daily   Yes Reported, Patient   gabapentin (NEURONTIN) 300 MG capsule Take 900 mg by mouth 3 times daily Prescribed 900mg TID.  Pt states he takes 4 capsules per dose.  Yes Reported, Patient   hydrOXYzine (VISTARIL) 25 MG capsule Take 25-50 mg by mouth as  "needed  Yes Reported, Patient   lisinopril-hydrochlorothiazide (ZESTORETIC) 20-25 MG tablet Take 1 tablet by mouth daily   Yes Reported, Patient   mirtazapine (REMERON) 30 MG tablet Take 30 mg by mouth At Bedtime   Yes Reported, Patient   oxyCODONE IR (ROXICODONE) 10 MG tablet Take 1 tablet by mouth every 4 hours as needed.  Yes Reported, Patient   Specialty Vitamins Products (VITAMINS FOR HAIR) TABS Take 1 tablet by mouth  Yes Reported, Patient   triamcinolone (KENALOG) 0.025 % cream Apply topically 3 times daily Affected area  Yes Reported, Patient   doxepin (SINEQUAN) 50 MG capsule Take 50 mg by mouth At Bedtime    Reported, Patient   Gauze Pads & Dressings (KERLIX BANDAGE ROLL 4.5\"X9.3') MISC 1 Piece daily   Lucinda Enciso MD   Gauze Pads & Dressings 4-1/2\"X4-1/2\" PADS 2 Pieces daily   Lucinda Enciso MD   lidocaine (XYLOCAINE) 5 % ointment Apply  topically daily. Apply a pea size amount to ulcer when changing the dressing on the wound.  Patient not taking: Reported on 5/19/2021   Heriberto Olivo DPM       The information provided in this note is only as accurate as the sources available at the time of update(s)     "

## 2021-07-30 NOTE — PROGRESS NOTES
Care Management Follow Up    Length of Stay (days): 1    Expected Discharge Date: 07/30/2021     Concerns to be Addressed: discharge planning  Patient is under observation status  Patient plan of care discussed at interdisciplinary rounds: Yes    Anticipated Discharge Disposition: Transitional Care  Disposition Comments: Unsure if pt will need TCU but discussed the option with pt's wife.  Anticipated Discharge Services: None  Anticipated Discharge DME: None    Patient/family educated on Medicare website which has current facility and service quality ratings: no  Education Provided on the Discharge Plan:    Patient/Family in Agreement with the Plan: unable to assess    Referrals Placed by CM/SW: Post Acute Facilities, Transportation  Private pay costs discussed: Not applicable    Additional Information:  SW informed by hospitalist that pt is ready for discharge today. SW was informed yesterday that pt may need TCU. SW mentioned this to hospitalist who put in a PT consult. SW to wait to see the results of the PT consult for the recommendation for placement.       MARTIN Porter

## 2021-07-31 VITALS
WEIGHT: 177.47 LBS | OXYGEN SATURATION: 98 % | TEMPERATURE: 98 F | HEART RATE: 51 BPM | BODY MASS INDEX: 20.51 KG/M2 | RESPIRATION RATE: 16 BRPM | SYSTOLIC BLOOD PRESSURE: 123 MMHG | DIASTOLIC BLOOD PRESSURE: 88 MMHG

## 2021-07-31 PROCEDURE — G0378 HOSPITAL OBSERVATION PER HR: HCPCS

## 2021-07-31 PROCEDURE — 250N000013 HC RX MED GY IP 250 OP 250 PS 637: Performed by: HOSPITALIST

## 2021-07-31 PROCEDURE — 99217 PR OBSERVATION CARE DISCHARGE: CPT | Performed by: HOSPITALIST

## 2021-07-31 RX ADMIN — OXYCODONE HYDROCHLORIDE 5 MG: 5 TABLET ORAL at 09:42

## 2021-07-31 RX ADMIN — DIPHENHYDRAMINE HYDROCHLORIDE 25 MG: 25 CAPSULE ORAL at 04:39

## 2021-07-31 RX ADMIN — ACETAMINOPHEN 650 MG: 325 TABLET, FILM COATED ORAL at 09:42

## 2021-07-31 RX ADMIN — OXYCODONE HYDROCHLORIDE 5 MG: 5 TABLET ORAL at 04:39

## 2021-07-31 RX ADMIN — GABAPENTIN 900 MG: 300 CAPSULE ORAL at 09:42

## 2021-07-31 NOTE — PROGRESS NOTES
Observation goals PRIOR TO DISCHARGE    Comments:   -diagnostic tests and consults completed and resulted - met  -vital signs normal or at patient baseline -met  -tolerating oral intake to maintain hydration -met  -adequate pain control on oral analgesics -met  -returns to baseline functional status -met  -safe disposition plan has been identified -not met

## 2021-07-31 NOTE — PROVIDER NOTIFICATION
Paged Dr. Huitron - Pt did not leave yesterday due to not having transportation, writer paged social work to help pt set up a taxi ride.  Discharge order was placed yesterday, provider is okay with pt discharging this morning.

## 2021-07-31 NOTE — PLAN OF CARE
A&O x4. VSS. Pain controlled with prn oxy. Tele: Sinus arhythmia/SR. Regular diet well tolerated. Neuro intact. CMS intact, BLE pulses present on doppler. LS: clear. BS: audible. +void, +flatus, -BM. Up IND.

## 2021-07-31 NOTE — PROGRESS NOTES
Discharge    Patient discharged to home via taxi ride with all belongings and no prescriptions. Patient educated on holding blood pressure medication and to take blood pressure readings daily and record. Understood plan and stated they will arrange an appt with PCP on Monday.   Care plan note completed.    Listed belongings gathered and returned to patient. Yes  Belongings returned to patient from security/pharmacy Yes  Care Plan and Patient education resolved: Yes  Prescriptions if needed, hard copies sent with patient  NA  Home and hospital acquired medications returned to patient: NA  Medication Bin checked and emptied on discharge Yes  Follow up appointment made for patient: No     Male Completion Statement: After discussing his treatment course we decided to discontinue isotretinoin therapy at this time. He shouldn't donate blood for one month after the last dose. He should call with any new symptoms of depression.

## 2021-07-31 NOTE — PROGRESS NOTES
SW:  D:  Spoke with patient's nurse who states that patient is in need of transport home and has no way to pay for a cab.  Call placed to [email or web address] to arrange for a cab on our account.  Updated patient that the cab will pick him up at door number 2.      PETRA Cullen, [facility]    [phone]  [facility]

## 2021-07-31 NOTE — DISCHARGE SUMMARY
[facility]  Hospitalist Discharge Summary      Date of Admission:  7/28/2021  Date of Discharge:  7/31/2021 10:13 AM  Discharging Provider: Lalo Huitron MD      Discharge Diagnoses   Syncope - possible orthostatic hypotension and/or polypharmacy  Concern of convulsive syncope with ongoing acute metabolic encephalopathy  Possible acute kidney injury, resolved  Chronic pain  Chronic anemia  PE/DVT  Hypertension  Hypokalemia  Chronic lower extremity venous ulcerations  COVID 19 pcr negative      Follow-ups Needed After Discharge   Follow-up Appointments     Follow-up and recommended labs and tests       PCP in 3-5 days for hospitalization follow up with BMP (blood test)             Unresulted Labs Ordered in the Past 30 Days of this Admission     Date and Time Order Name Status Description    7/28/2021  9:23 PM Blood Culture Peripheral Blood Preliminary     7/28/2021  9:23 PM Blood Culture Peripheral Blood Preliminary       These results will be followed up by IM    Discharge Disposition   Discharged to home  Condition at discharge: Stable      Hospital Course                    Casey Martinez is a 65 year old gentleman with past medical history that is most notable for PE, prior GI bleeding, and chronic venous lower extremity ulcer wounds, as well as chronic Hepatitis C, among others; who presents with convulsive syncope with ongoing acute metabolic encephalopathy.      Syncope - possible orthostatic hypotension and/or polypharmacy  Concern of convulsive syncope with ongoing acute metabolic encephalopathy  Cause unclear. He could have had a seizure and now with post-ictal confusion. I have reviewed his records in care Everywhere and as yet am unable to find any prior history of seizures. It is unknown how much if any recent alcohol he may have had. His Head CT tonight shows possible age indeterminate cerebral infarcts and we should rule out stroke if possible. He was hypotensive  initially but that seems to have resolved with IV fluid. He has leukopenia. We will rule out infectious causes of illness as able. He has chronic lower extremity ulcer wounds which do not currently appear infected. His COVID test was negative and UA was negative for UTI, CXR negative for pneumonia, and UDS showed only cannabinoids, making illicit ingestion of narcotics or cocaine less likely, though some prior notes document prior use of cocaine. He is on a number of medications as below that could lead to somnolence.   - MRI Brain and EEG pending - unremarkable for acute pathology or seizure activity  - Neurology consulted - signed off  - Telemetry unrevealing  - TTE ordered to rule out cardiogenic causes of syncope - EF normal, unremarkable  - Orthostatics ordered - mildly positive on DBP for change of 11mmhg - hold PTA ace/thiazide  - as below, holding zestoretic and will check BP at home, record it and bring it to PCP follow up. Hold atarax and minimize benadryl as his med profile increases risk of lightheadedness, dizziness, sedation etc.   - PCP follow up next week     Possible DASH - improving  Cr 1.68 and was 0.98 in 2/2021. Could be hypovolemic.  - Reassess after IV fluid given last night (100 ml/hour NS)  - encourage PO intake   - creat 1.2 --> 0.9     Chronic pain  Hold neurontin, remeron, flexeril, benadryl, doxepin prn Oxycodone for now pending recovery of consciousness  - more awake and alert, unable to given med list confirmation however, providing prn oxy for now  - Home regimen to continue but atarax stopped - he agreed to minimize sedating meds to reduce risk of syncope, sedation etc  - pcp follow up     Chronic anemia  Noting he has history of prior GI bleeding due to perforated ulcer reportedly. It seems he has had Billroth II surgery. The full details of all of this are not as yet known to me. Currently HGB is 11.3 and was 12.4 in 2//2021.  - Monitor while hospitalized, resume PPI for  now     PE/DVT  Unclear if he is on Xarelto at present given above bleeding in the past. Reportedly he has an IVC Filter in place.  - telemetry ordered  - PCP follow up      Hypertension  Hold Zestoretic for DASH and now due to BP low/normal  - BP WNL without zestoretic during admission, will hold at discharge  - He will check BP at home, record it, and bring it to PCP followup     Hypokalemia  Replacing. Check Magnesium and replace as well.  - normalized     Chronic lower extremity venus ulcerations  WOC consulted - supplies provided at discharge as he missed wound care clinic since he was admitted   - he will be calling his wound clinic to follow up asap     Rule Out COVID-19 infection  This patient was evaluated during a global COVID-19 pandemic, which necessitated consideration that the patient might be at risk for infection with the SARS-CoV-2 virus that causes COVID-19. Applicable protocols for evaluation were followed during the patient's care. Low suspicion for infection.   - negative COVID-19 PCR test result  - no current indication for precautions      Diet: Advance Diet as Tolerated: Regular Diet Adult  Diet    DVT Prophylaxis: Pneumatic Compression Devices  Zarco Catheter: Not present  Central Lines: None  Code Status: Full Code        Consultations This Hospital Stay   NEUROLOGY IP CONSULT  CARE MANAGEMENT / SOCIAL WORK IP CONSULT  WOUND OSTOMY CONTINENCE NURSE  IP CONSULT  PHYSICAL THERAPY ADULT IP CONSULT    Code Status   Prior    Time Spent on this Encounter   I, Lalo Huitron MD, personally saw the patient today and spent greater than 30 minutes discharging this patient.       Lalo Huitron MD  [place] [id number] [facility]  [facility] [place]note 2[place] [address]  Phone: [phone]  ______________________________________________________________________    Physical Exam   Vital Signs: Temp: 98  F (36.7  C) Temp src: Oral BP: 123/88 Pulse: 51   Resp: 16 SpO2: 98 % O2  Device: None (Room air)    Weight: 177 lbs 7.52 oz    Gen: NAD, very pleasant  HEENT: Normocephalic, EOMI, MMM  Resp: no crackles,  no wheezes, no increased work of resp  CV: S1S2 heard, reg rhythm, reg rate, no pedal edema  Abdo: soft, nontender, nondistended, bowel sounds present  Ext: calves nontender, well perfused, dressing CDI  Neuro: AAOx3, CN grossly intact, no facial asymmetry         Primary Care Physician   Physician No Ref-Primary    Discharge Orders      Reason for your hospital stay    Syncope and concern of seizure     Follow-up and recommended labs and tests     PCP in 3-5 days for hospitalization follow up with BMP (blood test)                                       As we discussed, call your wound clinic   Activity    Your activity upon discharge: as tolerated with assistance device or assist from family as needed     Discharge Instructions    You completed an echocardiogram (heart ultrasound) and brain MRI - neither of these tests showed any acute abnormality that would be causing you to pass out or become dizzy/lightheaded.  You should follow up with your regular doctor early next week.  You will need to have a repeated blood test called a basic metabolic panel to ensure your electrolytes and kidney function are normal.  Your blood pressure medication will be held (stopped) for the time being. You should check your blood pressure at home and record it. Bring that log to your follow up appointment to discuss with your doctor. You may need to adjust your medications. Additionally, your atarax has been stopped and you should cut back on the benadryl as these may be making you dizzy and even pass out. Discuss this further with your primary care physician.     Diet    Follow this diet upon discharge: Orders Placed This Encounter      Advance Diet as Tolerated: Regular Diet Adult       Significant Results and Procedures   Most Recent 3 CBC's:Recent Labs   Lab Test 07/30/21  0741 07/29/21  0517  07/28/21  2138   WBC 3.1* 4.5 3.2*   HGB 10.8* 10.6* 11.3*   MCV 88 88 88    191 191     Most Recent 3 BMP's:Recent Labs   Lab Test 07/30/21  0741 07/29/21  0517 07/28/21  2137    142 141   POTASSIUM 4.1 3.8  3.8 3.3*   CHLORIDE 114* 114* 112*   CO2 26 24 25   BUN 10 16 21   CR 0.94 1.20 1.68*   ANIONGAP 2* 4 4   PHAN 8.5 8.5 8.9   GLC 81 95 122*     Most Recent 6 Bacteria Isolates From Any Culture (See EPIC Reports for Culture Details):Recent Labs   Lab Test 05/26/21  1710 05/19/21  0734 02/04/21  1503   CULT No anaerobes isolated  No growth Heavy growth  Staphylococcus aureus  *  Heavy growth  Streptococcus dysgalactiae serogroup C/G  This organism is susceptible to ampicillin, penicillin, vancomycin and the cephalosporins.   If treatment is required AND your patient is allergic to penicillin, contact the   Microbiology Lab within 5 days to request susceptibility testing.  *  Light growth  Enterobacter cloacae complex  *  Light growth  Citrobacter freundii complex  *  Light growth  Pseudomonas aeruginosa  *  Light growth  Acinetobacter baumannii complex  *  Heavy growth  Staphylococcus aureus  Susceptibility testing done on previous specimen  *  Heavy growth  Streptococcus dysgalactiae serogroup C/G  This organism is susceptible to ampicillin, penicillin, vancomycin and the cephalosporins.   If treatment is required AND your patient is allergic to penicillin, contact the   Microbiology Lab within 5 days to request susceptibility testing.  *  Light growth  Citrobacter freundii complex  Susceptibility testing done on previous specimen  *  Light growth  Enterobacter cloacae complex  *  Light growth  Strain 2  Enterobacter cloacae complex  Susceptibility testing done on previous specimen  *  Light growth  Stenotrophomonas maltophilia  *  Light growth  Acinetobacter baumannii complex  Susceptibility testing done on previous specimen  * No anaerobes isolated  No growth   ,   Results for orders  placed or performed during the hospital encounter of 07/28/21   XR Chest Port 1 View    Narrative    CHEST ONE VIEW PORTABLE   7/28/2021 9:42 PM     HISTORY: AMS, hypoxic.    COMPARISON: None.      Impression    IMPRESSION: No airspace consolidation, pneumothorax, or pleural  effusion.    VERITO GUERRERO MD         SYSTEM ID:  [id number]   CT Head w/o Contrast    Narrative    EXAM: CT HEAD W/O CONTRAST  LOCATION: [facility]  DATE/TIME: 7/28/2021 9:41 PM    INDICATION: Seizure. Prolonged postictal phase. Altered mental status. Confusion.  COMPARISON: None.  TECHNIQUE: Routine CT Head without IV contrast. Multiplanar reformats. Dose reduction techniques were used.    FINDINGS:  INTRACRANIAL CONTENTS: No intracranial hemorrhage, extraaxial collection, or mass effect. There is a small region of encephalomalacia involving the left inferior frontal lobe, likely reflecting a sequela of remote injury. Small focal hypodensities are   identified involving the right genu of the internal capsule and left paramedian putamen. Mild presumed chronic small vessel ischemic changes. Mild generalized volume loss. No hydrocephalus.     VISUALIZED ORBITS/SINUSES/MASTOIDS: No intraorbital abnormality. No paranasal sinus mucosal disease. No middle ear or mastoid effusion.    BONES/SOFT TISSUES: No acute abnormality.      Impression    IMPRESSION:  1.  No acute hemorrhage, large territory infarct, mass, mass effect or abnormal extra-axial fluid collection.  2.  Small focal hypodensities involving the right genu of internal capsule and left paramedian putamen. These findings may reflect prominent perivascular spaces versus age-indeterminate lacunar type infarctions.  3.  Small region of encephalomalacia involving the left inferior frontal lobe, likely reflecting a sequela of remote injury.  4.  Chronic senescent and presumed microvascular ischemic changes, as above.       MR Brain w/o & w Contrast    Narrative    MRI  BRAIN WITHOUT AND WITH CONTRAST  2021 1:49 PM     HISTORY: Seizure, abnormal neurological exam .    TECHNIQUE: Multiplanar, multisequence MRI of the brain without and  with 8 mL Gadavist.      COMPARISON: Head CT 2021.     FINDINGS: No restricted diffusion to suggest infarct. No mass effect  or midline shift. No evidence of acute intracranial hemorrhage.  Ventricular size is within normal limits without evidence of  hydrocephalus. Chronic appearing encephalomalacia and gliotic changes  at the anterior inferior left frontal lobe which are probably due to  previous trauma given the location. Probable dilated perivascular  spaces in the basal ganglia.    Hippocampi are fairly symmetric without evidence of T2 signal  abnormality or atrophy.    No abnormal intracranial enhancement.    The facial structures appear normal. The major arterial T2 flow voids  at the base of the brain appear patent.       Impression    IMPRESSION:    1. No evidence of acute infarct, mass, hemorrhage, or herniation.  2. Chronic-appearing region of encephalomalacia and gliosis in the  anteroinferior left frontal lobe which is probably due to previous  trauma given the location.       ALEX YIP MD         SYSTEM ID:  [id number]   Echocardiogram Complete     Value    LVEF  55-60%    Narrative    [id number]  [id number]  [id number]  [id number]^AMAURY^IAN^SARA     [facility]  Echocardiography Laboratory  [address] [place] [address]     Name: YOANDY GALVAN  MRN: [id number]  : 1955  Study Date: 2021 10:25 AM  Age: 65 yrs  Gender: Male  Patient Location: [facility]  Reason For Study: Syncope and Collapse  Ordering Physician: IAN REBOLLEDO  Referring Physician: IAN REBOLLEDO  Performed By: DELILAH Lao     BSA: 2.1 m2  Height: 72 in  Weight: 189 lb  HR: 68  BP: 106/74 mmHg  ______________________________________________________________________________  Procedure  Complete Portable Echo  Adult.  ______________________________________________________________________________  Interpretation Summary     1. The left ventricle is normal in size. There is normal left ventricular wall  thickness. Left ventricular systolic function is normal. The visual ejection  fraction is 55-60%. Left ventricular diastolic function is normal. No regional  wall motion abnormalities noted.  2. The right ventricle is normal size. The right ventricular systolic function  is normal.  3. Trace mitral and tricuspid regurgitation.  4. No pericardial effusion.  5. No previous study for comparison.  ______________________________________________________________________________  Left Ventricle  The left ventricle is normal in size. There is normal left ventricular wall  thickness. Left ventricular systolic function is normal. The visual ejection  fraction is 55-60%. Left ventricular diastolic function is normal. No regional  wall motion abnormalities noted.     Right Ventricle  The right ventricle is normal size. The right ventricular systolic function is  normal.     Atria  The left atrium is mildly dilated. Right atrial size is normal. There is no  color Doppler evidence of an atrial shunt.     Mitral Valve  There is trace mitral regurgitation.     Tricuspid Valve  There is trace tricuspid regurgitation. Right ventricular systolic pressure  could not be approximated due to inadequate tricuspid regurgitation.     Aortic Valve  The aortic valve is not well visualized. There is trace aortic regurgitation.  No aortic stenosis is present.     Pulmonic Valve  There is no pulmonic valvular stenosis.     Vessels  The aortic root is normal size. Normal size ascending aorta. Descending aortic  velocity normal. Dilation of the inferior vena cava is present with normal  respiratory variation in diameter.     Pericardium  There is no pericardial effusion.     Rhythm  Sinus rhythm was  noted.  ______________________________________________________________________________  MMode/2D Measurements & Calculations  IVSd: 1.0 cm     LVIDd: 5.3 cm  LVIDs: 3.4 cm  LVPWd: 1.1 cm  FS: 35.9 %  LV mass(C)d: 221.6 grams  LV mass(C)dI: 106.5 grams/m2  Ao root diam: 3.5 cm  asc Aorta Diam: 3.5 cm  LVOT diam: 2.2 cm  LVOT area: 3.9 cm2  LA Volume (BP): 75.7 ml  LA Volume Index (BP): 36.4 ml/m2     LA Volume Indexed (AL/bp): 37.3 ml/m2  RWT: 0.43     Doppler Measurements & Calculations  MV E max gilmer: 107.0 cm/sec  MV A max gilmer: 91.3 cm/sec  MV E/A: 1.2  MV dec time: 0.27 sec  PA acc time: 0.14 sec  Pulm Sys Gilmer: 66.5 cm/sec  Pulm Patterson Gilmer: 50.0 cm/sec  Pulm A Revs Gilmer: 37.3 cm/sec  Pulm S/D: 1.3  E/E' av.6  Lateral E/e': 8.1  Medial E/e': 9.1     ______________________________________________________________________________  Report approved by: Paulino Thakur 2021 11:21 AM               Discharge Medications   Discharge Medication List as of 2021  4:47 PM      CONTINUE these medications which have NOT CHANGED    Details   acetaminophen (TYLENOL) 500 MG tablet Take 1,000 mg by mouth every 6 hours as needed , Historical      bacitracin-neomycin-polymyxin (NEOSPORIN) 400-5-5000 external ointment Apply to open wounds daily after shower.Disp-60 g, [id number]      cetirizine (ZYRTEC) 10 MG tablet Take 10 mg by mouth daily, Historical      cholecalciferol 25 MCG (1000 UT) TABS Take 2,000 Units by mouth daily, Historical      cyclobenzaprine (FLEXERIL) 10 MG tablet Take 10 mg by mouth 3 times daily as needed , Historical      diclofenac (VOLTAREN) 1 % topical gel Apply 4 g topically 4 times daily, Historical      diphenhydrAMINE (BENADRYL) 25 MG capsule Take 25 mg by mouth every 6 hours as needed , Historical      Ferrous Sulfate 324 MG TBEC Take 2 tablets by mouth 2 times daily , Historical      gabapentin (NEURONTIN) 300 MG capsule Take 900 mg by mouth 3 times daily Prescribed 900mg TID.  Pt states he  "takes 4 capsules per dose., Historical      mirtazapine (REMERON) 30 MG tablet Take 30 mg by mouth At Bedtime , Historical      oxyCODONE IR (ROXICODONE) 10 MG tablet Take 1 tablet by mouth every 4 hours as needed., Historical      Specialty Vitamins Products (VITAMINS FOR HAIR) TABS Take 1 tablet by mouth, Historical      triamcinolone (KENALOG) 0.025 % cream Apply topically 3 times daily Affected areaHistorical      doxepin (SINEQUAN) 50 MG capsule Take 50 mg by mouth At Bedtime , Historical      Gauze Pads & Dressings (KERLIX BANDAGE ROLL 4.5\"X9.3') MISC 1 Piece daily, Disp-30 each, R-1, E-Prescribe      Gauze Pads & Dressings 4-1/2\"X4-1/2\" PADS 2 Pieces daily, Disp-60 each, R-1, E-Prescribe      lidocaine (XYLOCAINE) 5 % ointment Apply  topically daily. Apply a pea size amount to ulcer when changing the dressing on the wound.Topical, DAILY Starting 4/11/2013, Until Discontinued, Disp-35.44 g, [id number]         STOP taking these medications       hydrOXYzine (VISTARIL) 25 MG capsule Comments:   Reason for Stopping:         lisinopril-hydrochlorothiazide (ZESTORETIC) 20-25 MG tablet Comments:   Reason for Stopping:             Allergies   Allergies   Allergen Reactions     Meperidine Other (See Comments)     Blood-Group Specific Substance Other (See Comments)     Patient has Anti- big E, big C, JKb, warm auto, HTLA, low frequency antibodies. Blood products may be delayed. Draw patient 24 hours prior to transfusion. Draw one red top and two purple top tubes for all type and screen orders.     Morphine Sulfate      "

## 2021-08-02 ENCOUNTER — PATIENT OUTREACH (OUTPATIENT)
Dept: CARE COORDINATION | Facility: CLINIC | Age: 66
End: 2021-08-02

## 2021-08-02 DIAGNOSIS — Z71.89 OTHER SPECIFIED COUNSELING: ICD-10-CM

## 2021-08-02 NOTE — PROGRESS NOTES
Clinic Care Coordination Contact  [facility]/Voicemail       Clinical Data: Care Coordinator Outreach  Outreach attempted x 1.    Tried number several times, with and without a 1 before it. Message from anydooR each time. Unable to leave a message. Plan:  Care Coordinator will try to reach patient again in 1-2 business days.    Destiny Veras  Care Transitions Assistant  [facility] acute hospital

## 2021-08-03 LAB
BACTERIA BLD CULT: NO GROWTH
BACTERIA BLD CULT: NO GROWTH

## 2021-08-17 ENCOUNTER — TELEPHONE (OUTPATIENT)
Dept: ORTHOPEDICS | Facility: CLINIC | Age: 66
End: 2021-08-17

## 2021-08-17 NOTE — TELEPHONE ENCOUNTER
[facility] Call Center    Phone Message    May a detailed message be left on voicemail: yes     Reason for Call  Patient stated Someone called Him .. Checking to see if Doctor gave Him a call. Please call Patient   Action Taken: Message routed to:  [facility] ([facility]): bal    Travel Screening: Not Applicable

## 2021-08-18 NOTE — TELEPHONE ENCOUNTER
Attempted to call patient back to let him know that our office has not tried to contact him. No answer and patient does not have voicemail so writer was unable to leave a message.

## 2021-10-18 PROCEDURE — U0003 INFECTIOUS AGENT DETECTION BY NUCLEIC ACID (DNA OR RNA); SEVERE ACUTE RESPIRATORY SYNDROME CORONAVIRUS 2 (SARS-COV-2) (CORONAVIRUS DISEASE [COVID-19]), AMPLIFIED PROBE TECHNIQUE, MAKING USE OF HIGH THROUGHPUT TECHNOLOGIES AS DESCRIBED BY CMS-2020-01-R: HCPCS | Mod: ORL | Performed by: INTERNAL MEDICINE

## 2021-10-19 ENCOUNTER — LAB REQUISITION (OUTPATIENT)
Dept: LAB | Facility: CLINIC | Age: 66
End: 2021-10-19
Payer: COMMERCIAL

## 2021-10-19 DIAGNOSIS — U07.1 COVID-19: ICD-10-CM

## 2021-10-19 PROCEDURE — U0005 INFEC AGEN DETEC AMPLI PROBE: HCPCS | Mod: ORL | Performed by: NURSE PRACTITIONER

## 2021-10-20 ENCOUNTER — LAB REQUISITION (OUTPATIENT)
Dept: LAB | Facility: CLINIC | Age: 66
End: 2021-10-20
Payer: COMMERCIAL

## 2021-10-20 DIAGNOSIS — D64.9 ANEMIA, UNSPECIFIED: ICD-10-CM

## 2021-10-20 LAB — SARS-COV-2 RNA RESP QL NAA+PROBE: NEGATIVE

## 2021-10-21 LAB
ALBUMIN SERPL-MCNC: 2.4 G/DL (ref 3.5–5)
ALP SERPL-CCNC: 137 U/L (ref 45–120)
ALT SERPL W P-5'-P-CCNC: <9 U/L (ref 0–45)
AST SERPL W P-5'-P-CCNC: 17 U/L (ref 0–40)
BASOPHILS # BLD AUTO: 0.1 10E3/UL (ref 0–0.2)
BASOPHILS NFR BLD AUTO: 1 %
BILIRUB DIRECT SERPL-MCNC: 0.2 MG/DL
BILIRUB SERPL-MCNC: 0.2 MG/DL (ref 0–1)
EOSINOPHIL # BLD AUTO: 1 10E3/UL (ref 0–0.7)
EOSINOPHIL NFR BLD AUTO: 12 %
ERYTHROCYTE [DISTWIDTH] IN BLOOD BY AUTOMATED COUNT: 17.1 % (ref 10–15)
HBA1C MFR BLD: 4.6 %
HCT VFR BLD AUTO: 26.4 % (ref 40–53)
HGB BLD-MCNC: 8.2 G/DL (ref 13.3–17.7)
IMM GRANULOCYTES # BLD: 0 10E3/UL
IMM GRANULOCYTES NFR BLD: 0 %
LYMPHOCYTES # BLD AUTO: 1 10E3/UL (ref 0.8–5.3)
LYMPHOCYTES NFR BLD AUTO: 11 %
MCH RBC QN AUTO: 27.3 PG (ref 26.5–33)
MCHC RBC AUTO-ENTMCNC: 31.1 G/DL (ref 31.5–36.5)
MCV RBC AUTO: 88 FL (ref 78–100)
MONOCYTES # BLD AUTO: 0.4 10E3/UL (ref 0–1.3)
MONOCYTES NFR BLD AUTO: 5 %
NEUTROPHILS # BLD AUTO: 5.9 10E3/UL (ref 1.6–8.3)
NEUTROPHILS NFR BLD AUTO: 71 %
NRBC # BLD AUTO: 0 10E3/UL
NRBC BLD AUTO-RTO: 0 /100
PLATELET # BLD AUTO: 526 10E3/UL (ref 150–450)
PROT SERPL-MCNC: 6.7 G/DL (ref 6–8)
RBC # BLD AUTO: 3 10E6/UL (ref 4.4–5.9)
WBC # BLD AUTO: 8.4 10E3/UL (ref 4–11)

## 2021-10-21 PROCEDURE — 36415 COLL VENOUS BLD VENIPUNCTURE: CPT | Mod: ORL | Performed by: NURSE PRACTITIONER

## 2021-10-21 PROCEDURE — 83036 HEMOGLOBIN GLYCOSYLATED A1C: CPT | Mod: ORL | Performed by: NURSE PRACTITIONER

## 2021-10-21 PROCEDURE — 80076 HEPATIC FUNCTION PANEL: CPT | Mod: ORL | Performed by: NURSE PRACTITIONER

## 2021-10-21 PROCEDURE — P9604 ONE-WAY ALLOW PRORATED TRIP: HCPCS | Mod: ORL | Performed by: NURSE PRACTITIONER

## 2021-10-21 PROCEDURE — 85025 COMPLETE CBC W/AUTO DIFF WBC: CPT | Mod: ORL | Performed by: NURSE PRACTITIONER

## 2021-10-22 ENCOUNTER — LAB REQUISITION (OUTPATIENT)
Dept: LAB | Facility: CLINIC | Age: 66
End: 2021-10-22
Payer: COMMERCIAL

## 2021-10-22 DIAGNOSIS — U07.1 COVID-19: ICD-10-CM

## 2021-10-23 LAB — SARS-COV-2 RNA RESP QL NAA+PROBE: NEGATIVE

## 2021-10-25 PROCEDURE — U0003 INFECTIOUS AGENT DETECTION BY NUCLEIC ACID (DNA OR RNA); SEVERE ACUTE RESPIRATORY SYNDROME CORONAVIRUS 2 (SARS-COV-2) (CORONAVIRUS DISEASE [COVID-19]), AMPLIFIED PROBE TECHNIQUE, MAKING USE OF HIGH THROUGHPUT TECHNOLOGIES AS DESCRIBED BY CMS-2020-01-R: HCPCS | Mod: ORL | Performed by: NURSE PRACTITIONER

## 2021-10-27 LAB — SARS-COV-2 RNA RESP QL NAA+PROBE: NEGATIVE

## 2021-10-29 ENCOUNTER — LAB REQUISITION (OUTPATIENT)
Dept: LAB | Facility: CLINIC | Age: 66
End: 2021-10-29
Payer: COMMERCIAL

## 2021-10-29 DIAGNOSIS — D64.9 ANEMIA, UNSPECIFIED: ICD-10-CM

## 2021-11-01 LAB
ANION GAP SERPL CALCULATED.3IONS-SCNC: 9 MMOL/L (ref 5–18)
BASOPHILS # BLD AUTO: 0.1 10E3/UL (ref 0–0.2)
BASOPHILS NFR BLD AUTO: 1 %
BUN SERPL-MCNC: 18 MG/DL (ref 8–22)
CALCIUM SERPL-MCNC: 9.7 MG/DL (ref 8.5–10.5)
CHLORIDE BLD-SCNC: 106 MMOL/L (ref 98–107)
CO2 SERPL-SCNC: 21 MMOL/L (ref 22–31)
CREAT SERPL-MCNC: 0.82 MG/DL (ref 0.7–1.3)
EOSINOPHIL # BLD AUTO: 0.5 10E3/UL (ref 0–0.7)
EOSINOPHIL NFR BLD AUTO: 7 %
ERYTHROCYTE [DISTWIDTH] IN BLOOD BY AUTOMATED COUNT: 17.6 % (ref 10–15)
FERRITIN SERPL-MCNC: 704 NG/ML (ref 27–300)
FOLATE SERPL-MCNC: 7.3 NG/ML
GFR SERPL CREATININE-BSD FRML MDRD: >90 ML/MIN/1.73M2
GLUCOSE BLD-MCNC: 88 MG/DL (ref 70–125)
HCT VFR BLD AUTO: 31.4 % (ref 40–53)
HGB BLD-MCNC: 9.6 G/DL (ref 13.3–17.7)
IMM GRANULOCYTES # BLD: 0 10E3/UL
IMM GRANULOCYTES NFR BLD: 1 %
IRON SERPL-MCNC: 47 UG/DL (ref 42–175)
LYMPHOCYTES # BLD AUTO: 1.2 10E3/UL (ref 0.8–5.3)
LYMPHOCYTES NFR BLD AUTO: 18 %
MCH RBC QN AUTO: 27.1 PG (ref 26.5–33)
MCHC RBC AUTO-ENTMCNC: 30.6 G/DL (ref 31.5–36.5)
MCV RBC AUTO: 89 FL (ref 78–100)
MONOCYTES # BLD AUTO: 0.3 10E3/UL (ref 0–1.3)
MONOCYTES NFR BLD AUTO: 5 %
NEUTROPHILS # BLD AUTO: 4.3 10E3/UL (ref 1.6–8.3)
NEUTROPHILS NFR BLD AUTO: 68 %
NRBC # BLD AUTO: 0 10E3/UL
NRBC BLD AUTO-RTO: 0 /100
PLATELET # BLD AUTO: 433 10E3/UL (ref 150–450)
POTASSIUM BLD-SCNC: 4.1 MMOL/L (ref 3.5–5)
RBC # BLD AUTO: 3.54 10E6/UL (ref 4.4–5.9)
SODIUM SERPL-SCNC: 136 MMOL/L (ref 136–145)
VIT B12 SERPL-MCNC: 230 PG/ML (ref 213–816)
WBC # BLD AUTO: 6.4 10E3/UL (ref 4–11)

## 2021-11-01 PROCEDURE — 82607 VITAMIN B-12: CPT | Mod: ORL | Performed by: NURSE PRACTITIONER

## 2021-11-01 PROCEDURE — P9604 ONE-WAY ALLOW PRORATED TRIP: HCPCS | Mod: ORL | Performed by: NURSE PRACTITIONER

## 2021-11-01 PROCEDURE — 83540 ASSAY OF IRON: CPT | Mod: ORL | Performed by: NURSE PRACTITIONER

## 2021-11-01 PROCEDURE — 36415 COLL VENOUS BLD VENIPUNCTURE: CPT | Mod: ORL | Performed by: NURSE PRACTITIONER

## 2021-11-01 PROCEDURE — 82728 ASSAY OF FERRITIN: CPT | Mod: ORL | Performed by: NURSE PRACTITIONER

## 2021-11-01 PROCEDURE — 82746 ASSAY OF FOLIC ACID SERUM: CPT | Mod: ORL | Performed by: NURSE PRACTITIONER

## 2021-11-01 PROCEDURE — 85025 COMPLETE CBC W/AUTO DIFF WBC: CPT | Mod: ORL | Performed by: NURSE PRACTITIONER

## 2021-11-01 PROCEDURE — 80048 BASIC METABOLIC PNL TOTAL CA: CPT | Mod: ORL | Performed by: NURSE PRACTITIONER

## 2021-11-30 ENCOUNTER — LAB REQUISITION (OUTPATIENT)
Dept: LAB | Facility: CLINIC | Age: 66
End: 2021-11-30
Payer: COMMERCIAL

## 2021-11-30 DIAGNOSIS — D64.9 ANEMIA, UNSPECIFIED: ICD-10-CM

## 2021-12-01 LAB
BASOPHILS # BLD AUTO: 0 10E3/UL (ref 0–0.2)
BASOPHILS NFR BLD AUTO: 1 %
EOSINOPHIL # BLD AUTO: 0.4 10E3/UL (ref 0–0.7)
EOSINOPHIL NFR BLD AUTO: 7 %
ERYTHROCYTE [DISTWIDTH] IN BLOOD BY AUTOMATED COUNT: 17.2 % (ref 10–15)
HCT VFR BLD AUTO: 32.9 % (ref 40–53)
HGB BLD-MCNC: 10.3 G/DL (ref 13.3–17.7)
IMM GRANULOCYTES # BLD: 0 10E3/UL
IMM GRANULOCYTES NFR BLD: 1 %
LYMPHOCYTES # BLD AUTO: 1.3 10E3/UL (ref 0.8–5.3)
LYMPHOCYTES NFR BLD AUTO: 19 %
MCH RBC QN AUTO: 27.4 PG (ref 26.5–33)
MCHC RBC AUTO-ENTMCNC: 31.3 G/DL (ref 31.5–36.5)
MCV RBC AUTO: 88 FL (ref 78–100)
MONOCYTES # BLD AUTO: 0.3 10E3/UL (ref 0–1.3)
MONOCYTES NFR BLD AUTO: 5 %
NEUTROPHILS # BLD AUTO: 4.6 10E3/UL (ref 1.6–8.3)
NEUTROPHILS NFR BLD AUTO: 67 %
NRBC # BLD AUTO: 0 10E3/UL
NRBC BLD AUTO-RTO: 0 /100
PLATELET # BLD AUTO: 287 10E3/UL (ref 150–450)
RBC # BLD AUTO: 3.76 10E6/UL (ref 4.4–5.9)
WBC # BLD AUTO: 6.7 10E3/UL (ref 4–11)

## 2021-12-01 PROCEDURE — 85025 COMPLETE CBC W/AUTO DIFF WBC: CPT | Mod: ORL | Performed by: NURSE PRACTITIONER

## 2021-12-01 PROCEDURE — 36415 COLL VENOUS BLD VENIPUNCTURE: CPT | Mod: ORL | Performed by: NURSE PRACTITIONER

## 2021-12-01 PROCEDURE — 82306 VITAMIN D 25 HYDROXY: CPT | Mod: ORL | Performed by: NURSE PRACTITIONER

## 2021-12-01 PROCEDURE — P9604 ONE-WAY ALLOW PRORATED TRIP: HCPCS | Mod: ORL | Performed by: NURSE PRACTITIONER

## 2021-12-02 LAB — DEPRECATED CALCIDIOL+CALCIFEROL SERPL-MC: 38 UG/L (ref 30–80)

## 2021-12-03 PROCEDURE — U0005 INFEC AGEN DETEC AMPLI PROBE: HCPCS | Mod: ORL | Performed by: NURSE PRACTITIONER

## 2021-12-04 ENCOUNTER — LAB REQUISITION (OUTPATIENT)
Dept: LAB | Facility: CLINIC | Age: 66
End: 2021-12-04
Payer: COMMERCIAL

## 2021-12-04 LAB — SARS-COV-2 RNA RESP QL NAA+PROBE: NEGATIVE

## 2021-12-09 ENCOUNTER — LAB REQUISITION (OUTPATIENT)
Dept: LAB | Facility: CLINIC | Age: 66
End: 2021-12-09
Payer: COMMERCIAL

## 2021-12-09 DIAGNOSIS — U07.1 COVID-19: ICD-10-CM

## 2021-12-09 PROCEDURE — U0005 INFEC AGEN DETEC AMPLI PROBE: HCPCS | Mod: ORL | Performed by: INTERNAL MEDICINE

## 2021-12-10 LAB — SARS-COV-2 RNA RESP QL NAA+PROBE: NEGATIVE

## 2021-12-14 ENCOUNTER — LAB REQUISITION (OUTPATIENT)
Dept: LAB | Facility: CLINIC | Age: 66
End: 2021-12-14
Payer: COMMERCIAL

## 2021-12-14 DIAGNOSIS — U07.1 COVID-19: ICD-10-CM

## 2021-12-14 DIAGNOSIS — D64.9 ANEMIA, UNSPECIFIED: ICD-10-CM

## 2021-12-14 PROCEDURE — U0005 INFEC AGEN DETEC AMPLI PROBE: HCPCS | Mod: ORL | Performed by: INTERNAL MEDICINE

## 2021-12-15 LAB
ANION GAP SERPL CALCULATED.3IONS-SCNC: 7 MMOL/L (ref 5–18)
BASOPHILS # BLD AUTO: 0 10E3/UL (ref 0–0.2)
BASOPHILS NFR BLD AUTO: 1 %
BUN SERPL-MCNC: 17 MG/DL (ref 8–22)
CALCIUM SERPL-MCNC: 8.4 MG/DL (ref 8.5–10.5)
CHLORIDE BLD-SCNC: 111 MMOL/L (ref 98–107)
CO2 SERPL-SCNC: 23 MMOL/L (ref 22–31)
CREAT SERPL-MCNC: 0.78 MG/DL (ref 0.7–1.3)
CREAT SERPL-MCNC: 0.78 MG/DL (ref 0.7–1.3)
EOSINOPHIL # BLD AUTO: 0.3 10E3/UL (ref 0–0.7)
EOSINOPHIL NFR BLD AUTO: 5 %
ERYTHROCYTE [DISTWIDTH] IN BLOOD BY AUTOMATED COUNT: 17.2 % (ref 10–15)
GFR SERPL CREATININE-BSD FRML MDRD: >90 ML/MIN/1.73M2
GFR SERPL CREATININE-BSD FRML MDRD: >90 ML/MIN/1.73M2
GLUCOSE BLD-MCNC: 60 MG/DL (ref 70–125)
HCT VFR BLD AUTO: 29.6 % (ref 40–53)
HGB BLD-MCNC: 9.3 G/DL (ref 13.3–17.7)
IMM GRANULOCYTES # BLD: 0.1 10E3/UL
IMM GRANULOCYTES NFR BLD: 1 %
IRON SERPL-MCNC: 42 UG/DL (ref 42–175)
LYMPHOCYTES # BLD AUTO: 1.2 10E3/UL (ref 0.8–5.3)
LYMPHOCYTES NFR BLD AUTO: 21 %
MCH RBC QN AUTO: 26.5 PG (ref 26.5–33)
MCHC RBC AUTO-ENTMCNC: 31.4 G/DL (ref 31.5–36.5)
MCV RBC AUTO: 84 FL (ref 78–100)
MONOCYTES # BLD AUTO: 0.4 10E3/UL (ref 0–1.3)
MONOCYTES NFR BLD AUTO: 7 %
NEUTROPHILS # BLD AUTO: 3.6 10E3/UL (ref 1.6–8.3)
NEUTROPHILS NFR BLD AUTO: 65 %
NRBC # BLD AUTO: 0 10E3/UL
NRBC BLD AUTO-RTO: 0 /100
PLATELET # BLD AUTO: 271 10E3/UL (ref 150–450)
POTASSIUM BLD-SCNC: 4 MMOL/L (ref 3.5–5)
RBC # BLD AUTO: 3.51 10E6/UL (ref 4.4–5.9)
SODIUM SERPL-SCNC: 141 MMOL/L (ref 136–145)
WBC # BLD AUTO: 5.5 10E3/UL (ref 4–11)

## 2021-12-15 PROCEDURE — P9604 ONE-WAY ALLOW PRORATED TRIP: HCPCS | Mod: ORL | Performed by: NURSE PRACTITIONER

## 2021-12-15 PROCEDURE — 85025 COMPLETE CBC W/AUTO DIFF WBC: CPT | Mod: ORL | Performed by: NURSE PRACTITIONER

## 2021-12-15 PROCEDURE — 83540 ASSAY OF IRON: CPT | Mod: ORL | Performed by: NURSE PRACTITIONER

## 2021-12-15 PROCEDURE — 36415 COLL VENOUS BLD VENIPUNCTURE: CPT | Mod: ORL | Performed by: NURSE PRACTITIONER

## 2021-12-15 PROCEDURE — 80048 BASIC METABOLIC PNL TOTAL CA: CPT | Mod: ORL | Performed by: NURSE PRACTITIONER

## 2021-12-16 LAB — SARS-COV-2 RNA RESP QL NAA+PROBE: NEGATIVE

## 2022-01-10 ENCOUNTER — MEDICAL CORRESPONDENCE (OUTPATIENT)
Dept: HEALTH INFORMATION MANAGEMENT | Facility: CLINIC | Age: 67
End: 2022-01-10

## 2022-01-11 LAB
ALT SERPL-CCNC: 19 IU/L (ref 0–44)
AST SERPL-CCNC: 26 IU/L (ref 0–40)
CREATININE (EXTERNAL): 0.83 MG/DL (ref 0.76–1.27)
GFR ESTIMATED (EXTERNAL): 92 ML/MIN/1.73M2
GFR ESTIMATED (IF AFRICAN AMERICAN) (EXTERNAL): 106 ML/MIN/1.73M2
GLUCOSE (EXTERNAL): 93 MG/DL (ref 65–99)
POTASSIUM (EXTERNAL): 3.8 MMOL/L (ref 3.5–5.2)

## 2022-01-25 ENCOUNTER — TRANSFERRED RECORDS (OUTPATIENT)
Dept: HEALTH INFORMATION MANAGEMENT | Facility: CLINIC | Age: 67
End: 2022-01-25

## 2022-08-08 ENCOUNTER — TRANSFERRED RECORDS (OUTPATIENT)
Dept: HEALTH INFORMATION MANAGEMENT | Facility: CLINIC | Age: 67
End: 2022-08-08

## 2022-08-10 ENCOUNTER — MEDICAL CORRESPONDENCE (OUTPATIENT)
Dept: ADMINISTRATIVE | Facility: CLINIC | Age: 67
End: 2022-08-10

## 2022-08-11 ENCOUNTER — TRANSCRIBE ORDERS (OUTPATIENT)
Dept: OTHER | Age: 67
End: 2022-08-11

## 2022-08-11 ENCOUNTER — PRE VISIT (OUTPATIENT)
Dept: UROLOGY | Facility: CLINIC | Age: 67
End: 2022-08-11

## 2022-08-11 DIAGNOSIS — N40.1 BENIGN PROSTATIC HYPERPLASIA WITH LOWER URINARY TRACT SYMPTOMS: Primary | ICD-10-CM

## 2022-08-11 NOTE — TELEPHONE ENCOUNTER
Action 2022 JTV 10:50 AM    Action Taken CSS called patient. Patient did not . CSS was unable to LVM for patient to return call.        MEDICAL RECORDS REQUEST   [facility]  Urology Clinic  [address] [place] [id number]  PHONE: [phone]  Fax: [phone]        FUTURE VISIT INFORMATION                                                   Casey Lopesx, : 1955 scheduled for future visit at [facility]    APPOINTMENT INFORMATION:    Date: 2022    Provider:  John Back MD    Reason for Visit/Diagnosis: incontinence      RECORDS REQUESTED FOR VISIT                                                     NOTES  STATUS/DETAILS   OFFICE NOTE from other specialist  yes, 2022, 2022 -- Jennifer Garcia APRN, CNP @ [facility] [place]  2022 -- Max Akers MD @ [facility]  2022 -- Shan Vincent MD  @ [facility]   OPERATIVE REPORT  yes, 2022 -- Shan Vincent MD @ [facility]   MEDICATION LIST  yes   LABS     URINALYSIS (UA)  yes     PRE-VISIT CHECKLIST      Record collection complete Yes   Appointment appropriately scheduled           (right time/right provider) Yes   Joint diagnostic appointment coordinated correctly          (ensure right order & amount of time) Yes   MyChart activation No   Questionnaire complete If no, please explain pending

## 2022-11-25 ENCOUNTER — OFFICE VISIT (OUTPATIENT)
Dept: FAMILY MEDICINE | Facility: CLINIC | Age: 67
End: 2022-11-25
Payer: COMMERCIAL

## 2022-11-25 VITALS
HEART RATE: 71 BPM | SYSTOLIC BLOOD PRESSURE: 109 MMHG | TEMPERATURE: 98.6 F | DIASTOLIC BLOOD PRESSURE: 74 MMHG | RESPIRATION RATE: 16 BRPM | OXYGEN SATURATION: 98 %

## 2022-11-25 DIAGNOSIS — G54.6 PHANTOM LIMB PAIN (H): ICD-10-CM

## 2022-11-25 DIAGNOSIS — Z23 ENCOUNTER FOR IMMUNIZATION: ICD-10-CM

## 2022-11-25 DIAGNOSIS — Z23 HIGH PRIORITY FOR 2019-NCOV VACCINE: ICD-10-CM

## 2022-11-25 DIAGNOSIS — Z13.6 SCREENING FOR AAA (ABDOMINAL AORTIC ANEURYSM): ICD-10-CM

## 2022-11-25 DIAGNOSIS — Z89.612 HX OF AKA (ABOVE KNEE AMPUTATION), LEFT (H): ICD-10-CM

## 2022-11-25 DIAGNOSIS — G89.4 CHRONIC PAIN SYNDROME: Primary | ICD-10-CM

## 2022-11-25 PROBLEM — J43.9 EMPHYSEMA OF LUNG (H): Status: RESOLVED | Noted: 2020-06-21 | Resolved: 2022-11-25

## 2022-11-25 PROBLEM — I26.99 ACUTE PULMONARY EMBOLISM WITHOUT ACUTE COR PULMONALE (H): Status: RESOLVED | Noted: 2020-10-17 | Resolved: 2022-11-25

## 2022-11-25 PROBLEM — R41.82 ALTERED MENTAL STATUS, UNSPECIFIED ALTERED MENTAL STATUS TYPE: Status: RESOLVED | Noted: 2021-07-28 | Resolved: 2022-11-25

## 2022-11-25 PROBLEM — B18.2 CHRONIC HEPATITIS C WITHOUT HEPATIC COMA (H): Status: RESOLVED | Noted: 2019-12-28 | Resolved: 2022-11-25

## 2022-11-25 PROCEDURE — 90746 HEPB VACCINE 3 DOSE ADULT IM: CPT | Performed by: FAMILY MEDICINE

## 2022-11-25 PROCEDURE — 0134A COVID-19 VACCINE BIVALENT BOOSTER 18+ (MODERNA): CPT | Performed by: FAMILY MEDICINE

## 2022-11-25 PROCEDURE — 90471 IMMUNIZATION ADMIN: CPT | Performed by: FAMILY MEDICINE

## 2022-11-25 PROCEDURE — 91313 COVID-19 VACCINE BIVALENT BOOSTER 18+ (MODERNA): CPT | Performed by: FAMILY MEDICINE

## 2022-11-25 PROCEDURE — 99214 OFFICE O/P EST MOD 30 MIN: CPT | Mod: 25 | Performed by: FAMILY MEDICINE

## 2022-11-25 ASSESSMENT — PAIN SCALES - GENERAL: PAINLEVEL: NO PAIN (0)

## 2022-11-25 NOTE — PATIENT INSTRUCTIONS
Here is the plan from today's visit    1. Hx of AKA (above knee amputation), left (H)  Chronic pain      Please call or return to clinic if your symptoms don't go away.    Follow up plan  Return in about 1 year (around 11/25/2023).     Thank you for coming to [facility] today.  Lab Testing:  **If you had lab testing today and your results are reassuring or normal they will be mailed to you or sent through [facility] within 7 days.   **If the lab tests need quick action we will call you with the results.  The phone number we will call with results is # [phone] (home) . If this is not the best number please call our clinic and change the number.  Medication Refills:  If you need any refills please call your pharmacy and they will contact us.   If you need to  your refill at a new pharmacy, please contact the new pharmacy directly. The new pharmacy will help you get your medications transferred faster.   Scheduling:  If you have any concerns about today's visit or wish to schedule another appointment please call our office during normal business hours [phone] (8-5:00 M-F)  Information about [place] Medical Cannabis Program  You have been certified with the following diagnosis  Chronic Pain    I will complete your registration at the [place] Medical Cannabis Registration the next step is up to the State (This can take up to 30 days)  Registration involves the following steps: You need to act on the certification before 90 days!  Step 1: The patient visits his/her health care practitioner. (Done)  Step 2: The patient's health care practitioner enrolls in the Medical Cannabis Registry and certifies that the patient has a qualifying medical condition.( This was done today using the e-mail address [email or web address] and Phone number [phone])  Step 3: The patient gets an email with a link to the enrollment application. If the patient has a caregiver, the caregiver will need to complete an  application and pass a background check.  Step 4: The patient (and caregiver, if applicable) will be notified by the Office of Medical Cannabis once the application is approved.  Step 5 : After you are approved you will be given information about Cannabis pharmacies that you can go to buy medical cannabis. The pharmacist at this location will work with you to determine the best products.    Medical Cannabis is usually not the best choice nor the best studied choice for the above conditions.  Annual program fee is $200, ($50 for some Medical Assistance Programs/Social security Disability.)    All cost for the medication is out-of-pocket and can average between $100-$300/month      You will need to be recertified yearly.      Jesus Ledesma MD

## 2022-11-25 NOTE — PROGRESS NOTES
Assessment & Plan     Chronic pain syndrome  Phantom limb pain (H)  Patient meets criteria for medical cannabis this was completed and he was entered into the registry.    Hx of AKA (above knee amputation), left (H)       High priority for 2019-nCoV vaccine     - COVID-19,PF,MODERNA BIVALENT 18+Yrs    Encounter for immunization     - HEPATITIS B VACCINE,ADULT,IM    Screening for AAA (abdominal aortic aneurysm)     - Abdominal Aortic Aneurysm Screening/Tracking        I spent a total of 31 minutes on the day of the visit.   Time spent doing chart review, history and exam, documentation and further activities per the note       Nicotine/Tobacco Cessation:  He reports that he has been smoking. He has been smoking an average of .25 packs per day. He has never used smokeless tobacco.  Nicotine/Tobacco Cessation Plan:   Information offered: Patient not interested at this time          Return in about 1 year (around 11/25/2023).    Jesus Ledesma MD  [facility] MANAN    Subjective   Casey is a 66 year old, presenting for the following health issues:  medical marijuana (Would like to get enrolled in a medical marijuana program. Sees [facility]) and Imm/Inj (COVID-19 VACCINE)  [facility]    [facility]           SUBJECTIVE:    Casey is a 66 year old male who presents to clinic today for the following health issues  Chronic Pain, severe shoulder pain, Phantom pain Left knee (has AKA )   The visit is to determine if a patient would qualify for the program and does not guarantee that the patient will qualify.  If there is not enough information to determine this, then the patient may need release records, do additional visits or visits to other providers before the determination is made.  Indication for Medical Cannabis Certification:  Patient presents with:  medical marijuana: Would like to get enrolled in a medical marijuana program. Sees [facility]  Imm/Inj: COVID-19  VACCINE    Chronic Pain    Pain History:  When did you first notice your pain? - More than 6 weeks   Have you seen this provider for your pain in the past? Yes   Where in your body do you have pain? LEft shoulder and chronic phantom limb pain  Are you seeing anyone else for your pain? Yes - [facility]           PDMP Review     None              History of treatments for qualifying conditions:    Injections and oxycodone  Behavioral Health Diagnoses:    Any history of psychosis, hallucinations or delusions?  No       Psychiatric Medication History: none    Current Medication/substance Use  Methadone: Yes Details:  Buprenorphine no  Benzodiazepine: no  Alcohol: no  Other drug use: None    Chemical Dependency Treatment ?No     Social History and Associated Level of Functioning (See below):  Current Living Arrangements: live alone  Family/Children:      Does anyone in your family have a history of mental health condition? Social Connection:    Problem list and histories reviewed & adjusted, as indicated.  Additional history:     Had discussion that:       Medical Cannabis is usually not the best choice nor the best studied choice for the above conditions, therefore most patients will not qualify if they have not tried other treatments before considering Medical Cannabis.        Annual program fee is $200, ($50 for some Medical Assistance Programs/Social security Disability.)        All cost for the medication is out-of-pocket and can average between $100-$300/month    The patient will need to be recertified yearly.    E-mail [email or web address]      I certify that this patient has chronic pain. That is, this patient has pain whose cause cannot be removed and, according to generally accepted medical practice, the full range of pain management modalities appropriate for this patient has been used without adequate result or with intolerable side effects.      {      Review of Systems         Objective    BP  109/74   Pulse 71   Temp 98.6  F (37  C) (Oral)   Resp 16   SpO2 98%   There is no height or weight on file to calculate BMI.  Physical Exam  Vitals reviewed.   Constitutional:       General: He is not in acute distress.     Appearance: He is well-developed. He is not diaphoretic.   HENT:      Head: Normocephalic.   Eyes:      General: No scleral icterus.     Conjunctiva/sclera: Conjunctivae normal.   Neck:      Thyroid: No thyromegaly.   Cardiovascular:      Rate and Rhythm: Normal rate and regular rhythm.      Heart sounds: Normal heart sounds. No murmur heard.  Pulmonary:      Effort: Pulmonary effort is normal. No respiratory distress.      Breath sounds: Normal breath sounds. No wheezing.   Abdominal:      General: Bowel sounds are normal. There is no distension.      Palpations: Abdomen is soft. There is no hepatomegaly or splenomegaly.      Tenderness: There is no abdominal tenderness.   Musculoskeletal:         General: No edema.      Cervical back: Normal range of motion.      Right lower leg: Normal.        Legs:    Lymphadenopathy:      Cervical: No cervical adenopathy.   Skin:     General: Skin is warm and dry.   Neurological:      Mental Status: He is alert and oriented to person, place, and time.   Psychiatric:         Mood and Affect: Mood and affect normal.         Behavior: Behavior normal.         Thought Content: Thought content normal.         Judgment: Judgment normal.

## 2022-12-01 ENCOUNTER — TELEPHONE (OUTPATIENT)
Dept: FAMILY MEDICINE | Facility: CLINIC | Age: 67
End: 2022-12-01

## 2022-12-01 NOTE — TELEPHONE ENCOUNTER
[facility] phone call message- general phone call:    Reason for call: The patient had an appt with Dr Ledesma on 11/25/2022. The patient states he was supposed to rec'v a text concerning the cannabis program and he haven't heard back. The patient would like a call back.    Return call needed: Yes    OK to leave a message on voice mail? Yes    Primary language: English      needed? No    Call taken on December 1, 2022 at 11:09 AM by Rhonda Hutn

## 2022-12-02 NOTE — TELEPHONE ENCOUNTER
Yes he was registered with the [place] Cannabis program - [facility] Pt ID is [id number]  I used the e-mail   Email Address:  [email or web address]    The State program sends him information on the next steps. If you could call him that would be great  Nic CARDONA

## 2022-12-05 NOTE — TELEPHONE ENCOUNTER
RN called patient and explained Pt ID number and for him to use email for next steps.     Renu Chavez RN

## 2022-12-05 NOTE — TELEPHONE ENCOUNTER
The patient is stating his VM is not set up so he says there was no message left at the number [phone]. The patient is requesting a call back.

## 2022-12-05 NOTE — TELEPHONE ENCOUNTER
RN called patient and left VM on private number with instructions about medical cannabis form. Encouraged patient to call back with questions.     Renu Chavez RN

## 2022-12-06 NOTE — TELEPHONE ENCOUNTER
Patient called stating he did not receive email regarding medical cannabis. Email was incorrect in chart. Updated email and patient requests to have email resent.    Imelda Malik  Care Coordinator  [facility]  Phone:[phone]

## 2023-01-10 ENCOUNTER — TRANSFERRED RECORDS (OUTPATIENT)
Dept: MULTI SPECIALTY CLINIC | Facility: CLINIC | Age: 68
End: 2023-01-10
Payer: COMMERCIAL

## 2023-01-10 ENCOUNTER — TRANSCRIBE ORDERS (OUTPATIENT)
Dept: OTHER | Age: 68
End: 2023-01-10

## 2023-01-10 ENCOUNTER — TRANSFERRED RECORDS (OUTPATIENT)
Dept: HEALTH INFORMATION MANAGEMENT | Facility: CLINIC | Age: 68
End: 2023-01-10

## 2023-01-10 DIAGNOSIS — N40.0 BPH (BENIGN PROSTATIC HYPERPLASIA): Primary | ICD-10-CM

## 2023-01-10 LAB — PHQ9 SCORE: 16

## 2023-02-28 ENCOUNTER — TELEPHONE (OUTPATIENT)
Dept: FAMILY MEDICINE | Facility: CLINIC | Age: 68
End: 2023-02-28
Payer: COMMERCIAL

## 2023-02-28 DIAGNOSIS — G89.4 CHRONIC PAIN SYNDROME: ICD-10-CM

## 2023-02-28 DIAGNOSIS — G54.6 PHANTOM LIMB PAIN (H): ICD-10-CM

## 2023-02-28 DIAGNOSIS — Z89.612 HX OF AKA (ABOVE KNEE AMPUTATION), LEFT (H): Primary | ICD-10-CM

## 2023-02-28 NOTE — TELEPHONE ENCOUNTER
[facility] phone call message- general phone call:    Reason for call: The pt is requesting a referral to a pain clinic. The pt number is [phone].    Return call needed: Yes    OK to leave a message on voice mail? Yes    Primary language: English      needed? No    Call taken on February 28, 2023 at 11:37 AM by Fawad Moreno

## 2023-02-28 NOTE — TELEPHONE ENCOUNTER
Patient last seen 11/25/23 for Chronic pain, patient is requesting a referral to pain clinic, would you need to see the patient first?    Genny Phillips RN

## 2023-03-02 NOTE — TELEPHONE ENCOUNTER
Called patient to let him know about the referral made and give number to call, mailbox is full.    If patient calls back please tell him to call [phone] to schedule for pain clinic    Genny Phillips RN

## 2023-03-15 ENCOUNTER — OFFICE VISIT (OUTPATIENT)
Dept: ANESTHESIOLOGY | Facility: CLINIC | Age: 68
End: 2023-03-15
Attending: FAMILY MEDICINE
Payer: COMMERCIAL

## 2023-03-15 VITALS
DIASTOLIC BLOOD PRESSURE: 74 MMHG | HEIGHT: 78 IN | WEIGHT: 165 LBS | OXYGEN SATURATION: 100 % | SYSTOLIC BLOOD PRESSURE: 122 MMHG | HEART RATE: 71 BPM | BODY MASS INDEX: 19.09 KG/M2

## 2023-03-15 DIAGNOSIS — G54.6 PHANTOM LIMB PAIN (H): ICD-10-CM

## 2023-03-15 DIAGNOSIS — G89.4 CHRONIC PAIN SYNDROME: ICD-10-CM

## 2023-03-15 PROCEDURE — 99203 OFFICE O/P NEW LOW 30 MIN: CPT | Mod: GC | Performed by: ANESTHESIOLOGY

## 2023-03-15 ASSESSMENT — PAIN SCALES - GENERAL: PAINLEVEL: WORST PAIN (10)

## 2023-03-15 NOTE — TELEPHONE ENCOUNTER
Action March 15, 2023 JTV 12:26 PM    Action Taken CSS sent an urgent request to [facility] for images.      Action 2023 jTv 1:18 PM    Action Taken CSs received and resolved images from [facility].        MEDICAL RECORDS REQUEST   [facility]  [address] [place] [address]  PHONE: [phone]  Fax: [phone]        FUTURE VISIT INFORMATION                                                   Casey Martinez, : 1955 scheduled for future visit at [facility]    APPOINTMENT INFORMATION:    Date: 05/15/2023    Provider:  John Coulter MD    Reason for Visit/Diagnosis: BPH    REFERRAL INFORMATION:    Referring provider:  Dr Gareth Fermin @ [facility]    Clinic contact number:  Phone: [phone], Fax: [phone]    RECORDS REQUESTED FOR VISIT                                                     NOTES  STATUS/DETAILS   OFFICE NOTE from referring provider  yes, 01/10/2023,  -- Dr Gareth Fermin @ [facility]   OFFICE NOTE from other specialist  yes, 10/04/2022, 2022, ,  -- Jennifer Garcia APRN, CNP @ [facility]  2017 -- Sonya Leiva APRN, CNP  @ [facility]   MEDICATION LIST  yes   LABS     URINALYSIS (UA)  yes   PSA  yes   images  yes, [facility]  10/08/2021, 2020, 2017, 2017 -- CT ABD PELVIS  2019 -- US ABD     PRE-VISIT CHECKLIST      Record collection complete YES   Appointment appropriately scheduled           (right time/right provider) Yes   Joint diagnostic appointment coordinated correctly          (ensure right order & amount of time) Yes   MyChart activation If no, please explain PENDING   Questionnaire complete If no, please explain PENDING

## 2023-03-15 NOTE — LETTER
3/15/2023       RE: Casey Martinez  [address]  [facility] [address]     Dear Colleague,    Thank you for referring your patient, Casey Martinez, to the [facility]NAGEMENT [place] at [facility]. Please see a copy of my visit note below.    [facility] Consultation    Date of visit: 3/15/2023    Reason for consultation:    Casey Martinez is a 67 year old male who is seen in consultation today at the request of his provider, Jesus Ledesma.    Primary Care Provider is No Ref-Primary, Physician.  Pain medications are being prescribed by [facility].    Please see the [facility] health questionnaire which the patient completed and reviewed with me in detail.    Chief Complaint:    Chief Complaint   Patient presents with     Consult     Consult left knee, both shoulders pain       Patient Supplied Answers To the  Pain Questionnaire  No flowsheet data found.      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 The surgery was 1.5 years ago and feels sharp and vicelike pain at the phantom foot. He had previously had 5 total knee replacements in a 10 year period, each time because there was insufficient blood flow to the foot. The pain is constant and 10/10. Nothing makes it better and activity makes it worse. He also feels sharp pain proximal to the residual limb up until the hip which was present prior to the AKA. He was last prescribed oxycodone at [facility] in [place] at the beginning of the month but he reports that it didn't show in his urine drug screen and they are discontinuing for this reason. He is looking    Current treatments include:  Gabapentin (6 to 8 800 mg pills a day depending on how he feels)    Previous medication treatments included:  tylenol    Previous medication treatments  included:  Percocet for 30 years (#180 10mg pills a month)  Last prescribed by loren in [place]    Other treatments have included:  Casey Martinez has been seen at a pain clinic in the past.   PT: yes  Acupuncture: no   TENs Unit: no  Injections: yes    Past Medical History:  Past Medical History:   Diagnosis Date     Altered mental status, unspecified altered mental status type 7/28/2021     Anemia      Chronic pain      Cocaine abuse (H)      DVT (deep venous thrombosis) (H)      GI bleed      Gout      Hepatitis C      HTN (hypertension)      Infection of total knee replacement (H)     MRSA     Pancreatitis      Positive antinuclear antibody 2016     PUD (peptic ulcer disease)      Pulmonary embolism (H)     Status post IVC Filter placement     Venous ulcer (H)     in bilateral lower extremities     Patient Active Problem List    Diagnosis Date Noted     Hx of AKA (above knee amputation), left (H) 11/25/2022     Priority: Medium     DASH (acute kidney injury) (H) 10/28/2020     Priority: Medium     SOB (shortness of breath) 10/17/2020     Priority: Medium     Chronic deep vein thrombosis (DVT) (H) 06/21/2020     Priority: Medium     Formatting of this note might be different from the original.  On xarelto with IVC filter given history of GI bleeds       Encephalomalacia 06/21/2020     Priority: Medium     Formatting of this note might be different from the original.  Frontal lobe. Seen on CT. Also evidence of old CVA vs CO poisoning.       Hypotension 06/18/2020     Priority: Medium     Alcohol abuse 12/28/2019     Priority: Medium     Acute gastrointestinal hemorrhage 12/28/2019     Priority: Medium     Last Assessment & Plan:   Formatting of this note might be different from the original.  With syncope.       Acute deep vein thrombosis (DVT) of tibial vein of right lower extremity (H) 12/28/2019     Priority: Medium     Acute deep vein thrombosis (DVT) of femoral vein of right lower extremity (H) 12/28/2019      Priority: Medium     Benign prostatic hyperplasia 12/28/2019     Priority: Medium     Chronic back pain 12/28/2019     Priority: Medium     H/O Billroth II operation 12/28/2019     Priority: Medium     Formatting of this note might be different from the original.  Billroth II gastrojejunostomy       Tobacco abuse 12/28/2019     Priority: Medium     Peptic ulcer 12/28/2019     Priority: Medium     Formatting of this note might be different from the original.  Distant past. S/p Billroth II  Formatting of this note might be different from the original.  Distant past. 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  Last Assessment & Plan:   Discussed with patient today that his increased pain looks to be due to loosening of the femoral component.  The changes can be seen over the last several x-rays, more pronounced today.  His last infection labs in January 2016 were WNL; however, with his history of MRSA infection in the left knee an infectious cause of loosening shouldn't be excluded.  Will draw infection markers today.  Patient would like to move forward with revision surgery as soon as possible, so will plan for Monday, July 17th for revision left TKA versus explant and antibiotic spacer placement.       Aseptic loosening of prosthetic knee, sequela 07/12/2017     Priority: Medium     Last Assessment & Plan:   Formatting of this note might be different from the original.  Discussed with patient today that his increased pain looks to be due to loosening of the femoral component.  The changes can be seen over the last several x-rays, more pronounced today.  His last infection labs in January 2016 were WNL; however, with his history of MRSA infection in the left knee an infectious cause of loosening shouldn't be excluded.  Will draw  infection markers today.  Patient would like to move forward with revision surgery as soon as possible, so will plan for Monday, July 17th for revision left TKA versus explant and antibiotic spacer placement.       Uses community resources 11/14/2016     Priority: Medium     Positive HECTOR (antinuclear antibody) 01/21/2016     Priority: Medium     Formatting of this note might be different from the original.  Consider repeating.       Prurigo nodularis 02/04/2014     Priority: Medium     Venous insufficiency (chronic) (peripheral) 04/26/2013     Priority: Medium     Sleep disturbance 05/30/2012     Priority: Medium     Dysphagia 05/26/2012     Priority: Medium     Last Assessment & Plan:   Formatting of this note might be different from the original.  Imp: Patient has dysphagia to liquids and solids which could signify a motility issue. Recent EGD did not reveal any masses, strictures or other explanation for his dysphagia. I see that a CT a/p done in 2011 was unremarkable.     Plan: To further evaluate his dysphagia and weight loss I will check a barium swallow to look for a motility problem. He may need manometry. Will make further recommendations after his swallow test. Will Rx Omeprazole 40mg qd given his significant gastritis which may have been NSAID related. He was told to refrain from NSAIDs if possible.  Formatting of this note might be different from the original.  Last Assessment & Plan:   Imp: Patient has dysphagia to liquids and solids which could signify a motility issue. Recent EGD did not reveal any masses, strictures or other explanation for his dysphagia. I see that a CT a/p done in 2011 was unremarkable.     Plan: To further evaluate his dysphagia and weight loss I will check a barium swallow to look for a motility problem. He may need manometry. Will make further recommendations after his swallow test. Will Rx Omeprazole 40mg qd given his significant gastritis which may have been NSAID related. He  was told to refrain from NSAIDs if possible.       Hallux valgus, acquired 10/19/2011     Priority: Medium     Red blood cell disorder 09/02/2011     Priority: Medium     Formatting of this note might be different from the original.  Transfusion service note:  Patient noted to have developed multiple new red cell alloantibodies. He now demonstrates reactivity against Rh(C), Rh (E), Jkb antigens, as well as Sla and a warm autoantibody.  Anti-C, E and Jkb are all considered clinically significant and he will require red cell containing products to be negative for the above three antigens. This requires ordering cells confirmed negative for Jkb from [facility] which will require at least 4 hours advance warning. Sla is not considered to be clinically significant, Jed B Gorlin, MD, 9/2/2011 8:32 AM  Formatting of this note might be different from the original.  Overview:   Transfusion service note:  Patient noted to have developed multiple new red cell alloantibodies. He now demonstrates reactivity against Rh(C), Rh (E), Jkb antigens, as well as Sla and a warm autoantibody.  Anti-C, E and Jkb are all considered clinically significant and he will require red cell containing products to be negative for the above three antigens. This requires ordering cells confirmed negative for Jkb from [facility] which will require at least 4 hours advance warning. Sla is not considered to be clinically significant, Jed B Gorlin, MD, 9/2/2011 8:32 AM       Total knee replacement status 08/26/2011     Priority: Medium     Formatting of this note might be different from the original.  Revision total knee completed 8-25-11, Dr. Lalo Braden    Last Assessment & Plan:   Formatting of this note might be different from the original.  Postop Visit - great ROM, able to SLR. Incision dry.     No evidence of infection.   Discussed with his PCP, Dr Cooper, who approved a temporary supply of Norco.    Will have him stop his  Lovenox and gave him a knee immobilizer.  He should try to keep his leg compressed and still and use ice.  Formatting of this note might be different from the original.  Overview:   Revision total knee completed 8-25-11, Dr. Lalo Braden    Last Assessment & Plan:   Postop Visit - great ROM, able to SLR. Incision dry.     No evidence of infection.   Discussed with his PCP, Dr Cooper, who approved a temporary supply of Norco.    Will have him stop his Lovenox and gave him a knee immobilizer.  He should try to keep his leg compressed and still and use ice.       Patient nonadherence 07/13/2011     Priority: Medium     Formatting of this note might be different from the original.  Left knee post op instructions (NWB, use of immobilizer, wound care)  Formatting of this note might be different from the original.  Overview:   Left knee post op instructions (NWB, use of immobilizer, wound care)       Groin mass 06/01/2011     Priority: Medium     Tobacco dependency 02/02/2011     Priority: Medium     Gout 11/04/2010     Priority: Medium     Retained metal fragment 11/04/2010     Priority: Medium     Formatting of this note might be different from the original.  Retained metal fragment in the left proximal tibial area. Patient reports old injury with piece of metal becoming imbedded in the skin. He did not have medical care for this.  Formatting of this note might be different from the original.  Overview:   Retained metal fragment in the left proximal tibial area. Patient reports old injury with piece of metal becoming imbedded in the skin. He did not have medical care for this.       Neurodermatitis 11/04/2010     Priority: Medium       Past Surgical History:  Past Surgical History:   Procedure Laterality Date     MI LAPAROSCOPIC BILLROTH II GASTROJEJUNOSTOMY       REPLACEMENT TOTAL KNEE       Medications:  Current Outpatient Medications   Medication Sig Dispense Refill     acetaminophen (TYLENOL) 500 MG tablet Take  "1,000 mg by mouth every 6 hours as needed        bacitracin-neomycin-polymyxin (NEOSPORIN) 400-5-5000 external ointment Apply to open wounds daily after shower. 60 g 1     cetirizine (ZYRTEC) 10 MG tablet Take 10 mg by mouth daily       cholecalciferol 25 MCG (1000 UT) TABS Take 2,000 Units by mouth daily       cyclobenzaprine (FLEXERIL) 10 MG tablet Take 10 mg by mouth 3 times daily as needed        diclofenac (VOLTAREN) 1 % topical gel Apply 4 g topically 4 times daily       diphenhydrAMINE (BENADRYL) 25 MG capsule Take 25 mg by mouth every 6 hours as needed        doxepin (SINEQUAN) 50 MG capsule Take 50 mg by mouth At Bedtime        Ferrous Sulfate 324 MG TBEC Take 2 tablets by mouth 2 times daily        gabapentin (NEURONTIN) 300 MG capsule Take 900 mg by mouth 3 times daily Prescribed 900mg TID.  Pt states he takes 4 capsules per dose.       Gauze Pads & Dressings (KERLIX BANDAGE ROLL 4.5\"X9.3') MISC 1 Piece daily 30 each 1     Gauze Pads & Dressings 4-1/2\"X4-1/2\" PADS 2 Pieces daily 60 each 1     lidocaine (XYLOCAINE) 5 % ointment Apply  topically daily. Apply a pea size amount to ulcer when changing the dressing on the wound. 35.44 g 0     mirtazapine (REMERON) 30 MG tablet Take 30 mg by mouth At Bedtime        oxyCODONE IR (ROXICODONE) 10 MG tablet Take 5 mg by mouth every 4 hours as needed       rivaroxaban ANTICOAGULANT (XARELTO) 10 MG TABS tablet Take by mouth daily (with dinner)       triamcinolone (KENALOG) 0.025 % cream Apply topically 3 times daily Affected area       Allergies:     Allergies   Allergen Reactions     Meperidine Other (See Comments)     Blood-Group Specific Substance Other (See Comments)     Patient has Anti- big E, big C, JKb, warm auto, HTLA, low frequency antibodies. Blood products may be delayed. Draw patient 24 hours prior to transfusion. Draw one red top and two purple top tubes for all type and screen orders.     Morphine Sulfate      Social History:  [place]   Occupation/Schooling: construction  Tobacco use: cigarettes  Alcohol use: no  Drug use: marijuana for 45 years- relaxes him  History of chemical dependency treatment: no    Family history:  Family History   Problem Relation Age of Onset     Colon Cancer Brother      Family history of headaches: no    Review of Systems:    POSTIVE IN BOLD  GENERAL: fever/chills, fatigue, general unwell feeling, weight gain/loss.  HEAD/EYES:  headache, dizziness, or vision changes.    EARS/NOSE/THROAT:  Nosebleeds, hearing loss, sinus infection, earache, tinnitus.  IMMUNE:  Allergies, cancer, immune deficiency, or infections.  SKIN:  Urticaria, rash, hives  HEME/Lymphatic:   anemia, easy bruising, easy bleeding.  RESPIRATORY:  cough, wheezing, or shortness of breath  CARDIOVASCULAR/Circulation:  Extremity edema, syncope, hypertension, tachycardia, or angina.  GASTROINTESTINAL:  abdominal pain, nausea/emesis, diarrhea, constipation,  hematochezia, or melena.  ENDOCRINE:  Diabetes, steroid use,  thyroid disease or osteoporosis.  MUSCULOSKELETAL: neck pain, back pain, arthralgia, arthritis, or gout.  GENITOURINARY:  frequency, urgency, dysuria, difficulty voiding, hematuria or incontinence.  NEUROLOGIC:  weakness, numbness, paresthesias, seizure, tremor, stroke or memory loss.  PSYCHIATRIC:  depression, anxiety, stress, suicidal thoughts or mood swings.     Physical Exam:  Vitals:    03/15/23 0915   BP: 122/74   BP Location: Right arm   Patient Position: Chair   Cuff Size: Adult Regular   Pulse: 71   SpO2: 100%   Weight: 74.8 kg (165 lb)   Height: 1.981 m (6' 6\")     Exam:  Constitutional: healthy, alert and no distress  Head: normocephalic. Atraumatic.   Eyes: no redness or jaundice noted   Cardiovascular: Acyanotic  Respiratory: breathing comfortably  Skin: no suspicious lesions or rashes  Psychiatric: mentation appears normal, affect normal  MSK: left residual limb incision CDI      Assessment:  1. S/p Left transfemoral " amputation  2. Phantom Limb Pain      Casey Martinez is a 67 year old male who presents with the complaints of left phantom limb pain s/p left transfemoral amputation in 2021. He has been taking gabapentin 800 mg 6-8 tabs daily. He had previously been taking oxycodone for decades and his dosage was being weaned down. He reports that earlier this month his urine drug screen did not show oxycodone in his system so his physician at [facility] informed him that he could no longer have this medication written for him. We discussed that the amount of gabapentin he is taking daily is significantly higher than the maximum safe dose and that he would possibly also have better pain control if he would take it 800 mg TID consistently. Regarding his oxycodone, we informed him that we are unable to take over his opioid prescription and would recommend discussing this with his PCP. We recommended that he discuss prescribing with his primary care physician, or that if it is the case that his urine drug screen was an erroneous test, that he discuss the possibility of retesting.    Plan:  Diagnosis reviewed, treatment option addressed, and risk/benefits discussed.  Self-care instructions given.  I am recommending a multidisciplinary treatment plan to help this patient better manage his pain.      1. Physical Therapy: he has benefited previously, recommended to continue when able  2. Medication Management: Recommended not to take higher than Gabapentin 800 mg TID. Details regarding oxycodone described above.  3. Follow up: as needed.    The patient's assessment and plan was discussed with my attending physician Dr. Underwood.    Dong Fisher, DO  Pain medicine fellow    I saw and examined the patient with the Pain Fellow/Resident. I have reviewed and agree with the resident's note and plan of care and made changes and corrections directly to the body of the note.    TIME SPENT:  BY FELLOW/RESIDENT ALONE 20 MIN  BY MYSELF AND  FELLOW/RESIDENT TOGETHER 30 MIN      Erika Underwood MD  Pain Medicine, Department of Anesthesiology  , [facility]          KAYLAN reviewed AVS with patient. Patient to contact clinic if as questions or concerns, patient verbalized understanding      Flaca Ruiz CMA         Sincerely,    Erika Underwood MD

## 2023-03-15 NOTE — NURSING NOTE
Patient presents with:  Consult: Consult left knee, both shoulders pain      Worst Pain (10)     Pain Medications     Analgesics Other Refills Start End     acetaminophen (TYLENOL) 500 MG tablet     2/24/2021     Sig - Route: Take 1,000 mg by mouth every 6 hours as needed  - Oral    Class: Historical    Opioid Agonists Refills Start End     oxyCODONE IR (ROXICODONE) 10 MG tablet     5/10/2021     Sig - Route: Take 5 mg by mouth every 4 hours as needed - Oral    Class: Historical    Earliest Fill Date: 5/10/2021          What medications are you using for pain? Gabapentin, Tylenol    (New patients only) Have you been seen by another pain clinic/ provider? yes    (Return Patients only) What refills are you needing today? No    Expectation pain medication to restart his meds

## 2023-03-15 NOTE — PROGRESS NOTES
KAYLAN reviewed AVS with patient. Patient to contact clinic if as questions or concerns, patient verbalized understanding      Flaca Ruiz CMA

## 2023-03-15 NOTE — PATIENT INSTRUCTIONS
Medications:    Recommendations will be written in the providers note for your Primary Care provider (OR other providers in your care team) to review and make changes to your therapies based on their discretion.     Please provide the clinic with a minium of 1 week notice, on all prescription refills.     Recommended Follow up:      As Needed       To speak with a nurse, schedule/reschedule/cancel a clinic appointment, or request a medication refill call: [phone].    You can also reach us by [employer]: [email or web address]

## 2023-03-15 NOTE — PROGRESS NOTES
[facility] Consultation    Date of visit: 3/15/2023    Reason for consultation:    Casey Martinez is a 67 year old male who is seen in consultation today at the request of his provider, Jesus Ledesma.    Primary Care Provider is No Ref-Primary, Physician.  Pain medications are being prescribed by [facility].    Please see the [facility] health questionnaire which the patient completed and reviewed with me in detail.    Chief Complaint:    Chief Complaint   Patient presents with     Consult     Consult left knee, both shoulders pain       Patient Supplied Answers To the  Pain Questionnaire  No flowsheet data found.      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 The surgery was 1.5 years ago and feels sharp and vicelike pain at the phantom foot. He had previously had 5 total knee replacements in a 10 year period, each time because there was insufficient blood flow to the foot. The pain is constant and 10/10. Nothing makes it better and activity makes it worse. He also feels sharp pain proximal to the residual limb up until the hip which was present prior to the AKA. He was last prescribed oxycodone at [facility] in [place] at the beginning of the month but he reports that it didn't show in his urine drug screen and they are discontinuing for this reason. He is looking    Current treatments include:  Gabapentin (6 to 8 800 mg pills a day depending on how he feels)    Previous medication treatments included:  tylenol    Previous medication treatments included:  Percocet for 30 years (#180 10mg pills a month)  Last prescribed by [facility] in [place]    Other treatments have included:  Casey Martinez has been seen at a pain clinic in the past.   PT: yes  Acupuncture: no   TENs Unit: no  Injections: yes    Past Medical History:  Past Medical History:   Diagnosis Date     Altered mental status,  unspecified altered mental status type 7/28/2021     Anemia      Chronic pain      Cocaine abuse (H)      DVT (deep venous thrombosis) (H)      GI bleed      Gout      Hepatitis C      HTN (hypertension)      Infection of total knee replacement (H)     MRSA     Pancreatitis      Positive antinuclear antibody 2016     PUD (peptic ulcer disease)      Pulmonary embolism (H)     Status post IVC Filter placement     Venous ulcer (H)     in bilateral lower extremities     Patient Active Problem List    Diagnosis Date Noted     Hx of AKA (above knee amputation), left (H) 11/25/2022     Priority: Medium     DASH (acute kidney injury) (H) 10/28/2020     Priority: Medium     SOB (shortness of breath) 10/17/2020     Priority: Medium     Chronic deep vein thrombosis (DVT) (H) 06/21/2020     Priority: Medium     Formatting of this note might be different from the original.  On xarelto with IVC filter given history of GI bleeds       Encephalomalacia 06/21/2020     Priority: Medium     Formatting of this note might be different from the original.  Frontal lobe. Seen on CT. Also evidence of old CVA vs CO poisoning.       Hypotension 06/18/2020     Priority: Medium     Alcohol abuse 12/28/2019     Priority: Medium     Acute gastrointestinal hemorrhage 12/28/2019     Priority: Medium     Last Assessment & Plan:   Formatting of this note might be different from the original.  With syncope.       Acute deep vein thrombosis (DVT) of tibial vein of right lower extremity (H) 12/28/2019     Priority: Medium     Acute deep vein thrombosis (DVT) of femoral vein of right lower extremity (H) 12/28/2019     Priority: Medium     Benign prostatic hyperplasia 12/28/2019     Priority: Medium     Chronic back pain 12/28/2019     Priority: Medium     H/O Billroth II operation 12/28/2019     Priority: Medium     Formatting of this note might be different from the original.  Billroth II gastrojejunostomy       Tobacco abuse 12/28/2019     Priority:  Medium     Peptic ulcer 12/28/2019     Priority: Medium     Formatting of this note might be different from the original.  Distant past. S/p Billroth II  Formatting of this note might be different from the original.  Distant past. 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  Last Assessment & Plan:   Discussed with patient today that his increased pain looks to be due to loosening of the femoral component.  The changes can be seen over the last several x-rays, more pronounced today.  His last infection labs in January 2016 were WNL; however, with his history of MRSA infection in the left knee an infectious cause of loosening shouldn't be excluded.  Will draw infection markers today.  Patient would like to move forward with revision surgery as soon as possible, so will plan for Monday, July 17th for revision left TKA versus explant and antibiotic spacer placement.       Aseptic loosening of prosthetic knee, sequela 07/12/2017     Priority: Medium     Last Assessment & Plan:   Formatting of this note might be different from the original.  Discussed with patient today that his increased pain looks to be due to loosening of the femoral component.  The changes can be seen over the last several x-rays, more pronounced today.  His last infection labs in January 2016 were WNL; however, with his history of MRSA infection in the left knee an infectious cause of loosening shouldn't be excluded.  Will draw infection markers today.  Patient would like to move forward with revision surgery as soon as possible, so will plan for Monday, July 17th for revision left TKA versus explant and antibiotic spacer placement.       Uses [employer] 11/14/2016     Priority: Medium     Positive HECTOR (antinuclear antibody) 01/21/2016     Priority: Medium      Formatting of this note might be different from the original.  Consider repeating.       Prurigo nodularis 02/04/2014     Priority: Medium     Venous insufficiency (chronic) (peripheral) 04/26/2013     Priority: Medium     Sleep disturbance 05/30/2012     Priority: Medium     Dysphagia 05/26/2012     Priority: Medium     Last Assessment & Plan:   Formatting of this note might be different from the original.  Imp: Patient has dysphagia to liquids and solids which could signify a motility issue. Recent EGD did not reveal any masses, strictures or other explanation for his dysphagia. I see that a CT a/p done in 2011 was unremarkable.     Plan: To further evaluate his dysphagia and weight loss I will check a barium swallow to look for a motility problem. He may need manometry. Will make further recommendations after his swallow test. Will Rx Omeprazole 40mg qd given his significant gastritis which may have been NSAID related. He was told to refrain from NSAIDs if possible.  Formatting of this note might be different from the original.  Last Assessment & Plan:   Imp: Patient has dysphagia to liquids and solids which could signify a motility issue. Recent EGD did not reveal any masses, strictures or other explanation for his dysphagia. I see that a CT a/p done in 2011 was unremarkable.     Plan: To further evaluate his dysphagia and weight loss I will check a barium swallow to look for a motility problem. He may need manometry. Will make further recommendations after his swallow test. Will Rx Omeprazole 40mg qd given his significant gastritis which may have been NSAID related. He was told to refrain from NSAIDs if possible.       Hallux valgus, acquired 10/19/2011     Priority: Medium     Red blood cell disorder 09/02/2011     Priority: Medium     Formatting of this note might be different from the original.  Transfusion service note:  Patient noted to have developed multiple new red cell alloantibodies. He now  demonstrates reactivity against Rh(C), Rh (E), Jkb antigens, as well as Sla and a warm autoantibody.  Anti-C, E and Jkb are all considered clinically significant and he will require red cell containing products to be negative for the above three antigens. This requires ordering cells confirmed negative for Jkb from [facility] which will require at least 4 hours advance warning. Sla is not considered to be clinically significant, Jed B Gorlin, MD, 9/2/2011 8:32 AM  Formatting of this note might be different from the original.  Overview:   Transfusion service note:  Patient noted to have developed multiple new red cell alloantibodies. He now demonstrates reactivity against Rh(C), Rh (E), Jkb antigens, as well as Sla and a warm autoantibody.  Anti-C, E and Jkb are all considered clinically significant and he will require red cell containing products to be negative for the above three antigens. This requires ordering cells confirmed negative for Jkb from [facility] which will require at least 4 hours advance warning. Sla is not considered to be clinically significant, Jed B Gorlin, MD, 9/2/2011 8:32 AM       Total knee replacement status 08/26/2011     Priority: Medium     Formatting of this note might be different from the original.  Revision total knee completed 8-25-11, Dr. Lalo Braden    Last Assessment & Plan:   Formatting of this note might be different from the original.  Postop Visit - great ROM, able to SLR. Incision dry.     No evidence of infection.   Discussed with his PCP, Dr Cooper, who approved a temporary supply of Norco.    Will have him stop his Lovenox and gave him a knee immobilizer.  He should try to keep his leg compressed and still and use ice.  Formatting of this note might be different from the original.  Overview:   Revision total knee completed 8-25-11, Dr. Lalo Braden    Last Assessment & Plan:   Postop Visit - great ROM, able to SLR. Incision dry.     No evidence  of infection.   Discussed with his PCP, Dr Cooper, who approved a temporary supply of Norco.    Will have him stop his Lovenox and gave him a knee immobilizer.  He should try to keep his leg compressed and still and use ice.       Patient nonadherence 07/13/2011     Priority: Medium     Formatting of this note might be different from the original.  Left knee post op instructions (NWB, use of immobilizer, wound care)  Formatting of this note might be different from the original.  Overview:   Left knee post op instructions (NWB, use of immobilizer, wound care)       Groin mass 06/01/2011     Priority: Medium     Tobacco dependency 02/02/2011     Priority: Medium     Gout 11/04/2010     Priority: Medium     Retained metal fragment 11/04/2010     Priority: Medium     Formatting of this note might be different from the original.  Retained metal fragment in the left proximal tibial area. Patient reports old injury with piece of metal becoming imbedded in the skin. He did not have medical care for this.  Formatting of this note might be different from the original.  Overview:   Retained metal fragment in the left proximal tibial area. Patient reports old injury with piece of metal becoming imbedded in the skin. He did not have medical care for this.       Neurodermatitis 11/04/2010     Priority: Medium       Past Surgical History:  Past Surgical History:   Procedure Laterality Date     NH LAPAROSCOPIC BILLROTH II GASTROJEJUNOSTOMY       REPLACEMENT TOTAL KNEE       Medications:  Current Outpatient Medications   Medication Sig Dispense Refill     acetaminophen (TYLENOL) 500 MG tablet Take 1,000 mg by mouth every 6 hours as needed        bacitracin-neomycin-polymyxin (NEOSPORIN) 400-5-5000 external ointment Apply to open wounds daily after shower. 60 g 1     cetirizine (ZYRTEC) 10 MG tablet Take 10 mg by mouth daily       cholecalciferol 25 MCG (1000 UT) TABS Take 2,000 Units by mouth daily       cyclobenzaprine (FLEXERIL) 10  "MG tablet Take 10 mg by mouth 3 times daily as needed        diclofenac (VOLTAREN) 1 % topical gel Apply 4 g topically 4 times daily       diphenhydrAMINE (BENADRYL) 25 MG capsule Take 25 mg by mouth every 6 hours as needed        doxepin (SINEQUAN) 50 MG capsule Take 50 mg by mouth At Bedtime        Ferrous Sulfate 324 MG TBEC Take 2 tablets by mouth 2 times daily        gabapentin (NEURONTIN) 300 MG capsule Take 900 mg by mouth 3 times daily Prescribed 900mg TID.  Pt states he takes 4 capsules per dose.       Gauze Pads & Dressings (KERLIX BANDAGE ROLL 4.5\"X9.3') MISC 1 Piece daily 30 each 1     Gauze Pads & Dressings 4-1/2\"X4-1/2\" PADS 2 Pieces daily 60 each 1     lidocaine (XYLOCAINE) 5 % ointment Apply  topically daily. Apply a pea size amount to ulcer when changing the dressing on the wound. 35.44 g 0     mirtazapine (REMERON) 30 MG tablet Take 30 mg by mouth At Bedtime        oxyCODONE IR (ROXICODONE) 10 MG tablet Take 5 mg by mouth every 4 hours as needed       rivaroxaban ANTICOAGULANT (XARELTO) 10 MG TABS tablet Take by mouth daily (with dinner)       triamcinolone (KENALOG) 0.025 % cream Apply topically 3 times daily Affected area       Allergies:     Allergies   Allergen Reactions     Meperidine Other (See Comments)     Blood-Group Specific Substance Other (See Comments)     Patient has Anti- big E, big C, JKb, warm auto, HTLA, low frequency antibodies. Blood products may be delayed. Draw patient 24 hours prior to transfusion. Draw one red top and two purple top tubes for all type and screen orders.     Morphine Sulfate      Social History:  Home situation: [employer]   Occupation/Schooling: construction  Tobacco use: cigarettes  Alcohol use: no  Drug use: marijuana for 45 years- relaxes him  History of chemical dependency treatment: no    Family history:  Family History   Problem Relation Age of Onset     Colon Cancer Brother      Family history of headaches: no    Review of Systems:    POSTIVE IN " "BOLD  GENERAL: fever/chills, fatigue, general unwell feeling, weight gain/loss.  HEAD/EYES:  headache, dizziness, or vision changes.    EARS/NOSE/THROAT:  Nosebleeds, hearing loss, sinus infection, earache, tinnitus.  IMMUNE:  Allergies, cancer, immune deficiency, or infections.  SKIN:  Urticaria, rash, hives  HEME/Lymphatic:   anemia, easy bruising, easy bleeding.  RESPIRATORY:  cough, wheezing, or shortness of breath  CARDIOVASCULAR/Circulation:  Extremity edema, syncope, hypertension, tachycardia, or angina.  GASTROINTESTINAL:  abdominal pain, nausea/emesis, diarrhea, constipation,  hematochezia, or melena.  ENDOCRINE:  Diabetes, steroid use,  thyroid disease or osteoporosis.  MUSCULOSKELETAL: neck pain, back pain, arthralgia, arthritis, or gout.  GENITOURINARY:  frequency, urgency, dysuria, difficulty voiding, hematuria or incontinence.  NEUROLOGIC:  weakness, numbness, paresthesias, seizure, tremor, stroke or memory loss.  PSYCHIATRIC:  depression, anxiety, stress, suicidal thoughts or mood swings.     Physical Exam:  Vitals:    03/15/23 0915   BP: 122/74   BP Location: Right arm   Patient Position: Chair   Cuff Size: Adult Regular   Pulse: 71   SpO2: 100%   Weight: 74.8 kg (165 lb)   Height: 1.981 m (6' 6\")     Exam:  Constitutional: healthy, alert and no distress  Head: normocephalic. Atraumatic.   Eyes: no redness or jaundice noted   Cardiovascular: Acyanotic  Respiratory: breathing comfortably  Skin: no suspicious lesions or rashes  Psychiatric: mentation appears normal, affect normal  MSK: left residual limb incision CDI      Assessment:  1. S/p Left transfemoral amputation  2. Phantom Limb Pain      Casey Martinez is a 67 year old male who presents with the complaints of left phantom limb pain s/p left transfemoral amputation in 2021. He has been taking gabapentin 800 mg 6-8 tabs daily. He had previously been taking oxycodone for decades and his dosage was being weaned down. He reports that earlier this " month his urine drug screen did not show oxycodone in his system so his physician at [facility] informed him that he could no longer have this medication written for him. We discussed that the amount of gabapentin he is taking daily is significantly higher than the maximum safe dose and that he would possibly also have better pain control if he would take it 800 mg TID consistently. Regarding his oxycodone, we informed him that we are unable to take over his opioid prescription and would recommend discussing this with his PCP. We recommended that he discuss prescribing with his primary care physician, or that if it is the case that his urine drug screen was an erroneous test, that he discuss the possibility of retesting.    Plan:  Diagnosis reviewed, treatment option addressed, and risk/benefits discussed.  Self-care instructions given.  I am recommending a multidisciplinary treatment plan to help this patient better manage his pain.      1. Physical Therapy: he has benefited previously, recommended to continue when able  2. Medication Management: Recommended not to take higher than Gabapentin 800 mg TID. Details regarding oxycodone described above.  3. Follow up: as needed.    The patient's assessment and plan was discussed with my attending physician Dr. Underwood.    Dong Fisher DO  Pain medicine fellow    I saw and examined the patient with the Pain Fellow/Resident. I have reviewed and agree with the resident's note and plan of care and made changes and corrections directly to the body of the note.    TIME SPENT:  BY FELLOW/RESIDENT ALONE 20 MIN  BY MYSELF AND FELLOW/RESIDENT TOGETHER 30 MIN      Erika Underwood MD  Pain Medicine, Department of Anesthesiology  , [facility]

## 2023-04-06 ENCOUNTER — PRE VISIT (OUTPATIENT)
Dept: UROLOGY | Facility: CLINIC | Age: 68
End: 2023-04-06
Payer: COMMERCIAL

## 2023-04-06 NOTE — TELEPHONE ENCOUNTER
Reason for visit: consult      Dx/Hx/Sx: BPH    Records/imaging/labs/orders: in epic     At Rooming: EDUARDO

## 2023-05-15 ENCOUNTER — PRE VISIT (OUTPATIENT)
Dept: UROLOGY | Facility: CLINIC | Age: 68
End: 2023-05-15

## 2024-03-21 DIAGNOSIS — M25.512 PAIN OF BOTH SHOULDER JOINTS: Primary | ICD-10-CM

## 2024-03-21 DIAGNOSIS — M25.511 PAIN OF BOTH SHOULDER JOINTS: Primary | ICD-10-CM

## 2024-03-23 NOTE — TELEPHONE ENCOUNTER
Action 3.22.24 zhen   Action Taken Faxed imaging request to [facility].    3.25.24 zhen  Imaging resolved to [facility].       DIAGNOSIS: shoulders, interested in injections/ self ref/ no imaging/ [facility]     APPOINTMENT DATE: 3.25.24   NOTES STATUS DETAILS   OFFICE NOTE from other specialist CE 1.30.24, 4.26.23  Marcie  [facility]    1.3.24, 11.1.23  Braden  [facility]    11.1.23 + more  Grider  [facility]    + more related encounters in CE/Epic   MEDICATION LIST Internal    MRI Pacs 2.16.21  MR Shoulder Right    1.27.20  MR Shoulder Right   INJECTIONS CE    XRAYS (IMAGES & REPORTS) Pacs 11.1.23  XR Shoulder Right    11.1.23  XR Shoulder Left    11.7.19, 8.21.17, 8.17.17  XR Shoulder Right

## 2024-03-25 ENCOUNTER — PRE VISIT (OUTPATIENT)
Dept: ORTHOPEDICS | Facility: CLINIC | Age: 69
End: 2024-03-25

## 2024-04-03 ENCOUNTER — OFFICE VISIT (OUTPATIENT)
Dept: ORTHOPEDICS | Facility: CLINIC | Age: 69
End: 2024-04-03
Payer: COMMERCIAL

## 2024-04-03 DIAGNOSIS — M19.012 PRIMARY OSTEOARTHRITIS OF BOTH SHOULDERS: Primary | ICD-10-CM

## 2024-04-03 DIAGNOSIS — M12.811 ROTATOR CUFF TEAR ARTHROPATHY OF RIGHT SHOULDER: ICD-10-CM

## 2024-04-03 DIAGNOSIS — M75.101 ROTATOR CUFF TEAR ARTHROPATHY OF RIGHT SHOULDER: ICD-10-CM

## 2024-04-03 DIAGNOSIS — M19.011 PRIMARY OSTEOARTHRITIS OF BOTH SHOULDERS: Primary | ICD-10-CM

## 2024-04-03 PROCEDURE — 99203 OFFICE O/P NEW LOW 30 MIN: CPT | Performed by: FAMILY MEDICINE

## 2024-04-03 NOTE — PROGRESS NOTES
Sports Medicine Clinic Visit    PCP: No Ref-Primary, Physician    Casey Martinez is a 68 year old male who is seen  as self referral presenting with bilateral shoulder pain.     Injury: No recent BECKI     Location of Pain: bilateral shoulder; right shoulder worse than the left   Duration of Pain: 3 year(s)  Rating of Pain: 10/10  Pain is better with: CSI   Pain is worse with: Over head movements  Additional Features: Limited range of motion   Treatment so far consists of: bilateral glenohumeral cortisone injections 11/1/2023- relief for about 2 months   Voltaren gel   Prior History of related problems: No     There were no vitals taken for this visit.      Patient follows with orthopedics at [facility] [place] for chronic bilateral shoulder pain, most recent visit 5 months ago reviewed by me.  Patient has a history of chronic bilateral rotator cuff arthropathy.  Pain with overhead reaching and outstretched arm and lying on his shoulder at night.  No previous surgery to the shoulders.  Images at that time showed severe right-sided glenohumeral DJD consistent with severe rotator cuff arthropathy and left shoulder consistent with moderate glenohumeral DJD.  On 1/3/2024 patient had anatomy guided office-based bilateral glenohumeral cortisone injections.  Patient had had previous office-based anatomy guided bilateral glenohumeral cortisone injections 11/1/2023, and prior to that in 2021.        MRI of the right shoulder at [facility] [place]17/2021 showed a complete tear of the supraspinatus and infraspinatus with atrophy of the muscle bulk as well as a complete tear of the intra-articular biceps tendon and advanced glenohumeral DJD.    [facility]  Maintained on gabapentin, Tylenol, Voltaren.  Most recent clinic note 1/30/2024 reviewed by me.        Imaging studies below reviewed by me  Technique: XR SHOULDER RT 2/3V AP/GRASH/Y*   11/1/2023    Indication: pain      Comparison: Chest CT  dated 6/20/2020     Findings: No acute osseous abnormality. No dislocation or subluxation. Moderate to severe degenerative changes of left acromion clavicular and glenohumeral joint. Decreased acromiohumeral interval suspicious for rotator cuff injury. The visualized lung fields are clear.           XR SHOULDER LT 2/3V AP/GRASH/Y*  11/1/2023  Order: [id number]  Impression    Impression: Moderate degenerative changes of left acromion clavicular and glenohumeral joint. Suspected rotator cuff injury.             PMH:  Past Medical History:   Diagnosis Date   Acute pulmonary embolism without acute cor pulmonale (**) 10/17/2020   Alcohol abuse   Aseptic loosening of prosthetic knee, sequela 7/12/2017   BPH (benign prostatic hypertrophy)   Chronic gastric ulcer with bleeding and perforation (**)   Chronic infection of knee joint prosthesis, sequela 10/7/2021   Cocaine abuse (**)   Emphysema of lung (**) 6/21/2020   Essential hypertension 10/12/2021   Family history of colon cancer 3/7/2011   Gastrointestinal hemorrhage, unspecified gastrointestinal hemorrhage type 6/18/2020   Gout   Hepatitis C   Infection of total knee replacement, subsequent encounter 7/17/2017   Marijuana abuse   Osteoarthritis   most pronounced in left knee and left shoulder   Other specified hypotension 6/18/2020   PUD (peptic ulcer disease)   3 surgical interventions, 1991;   Pyogenic arthritis of left knee joint, due to unspecified organism (**) 9/22/2021   Status post revision of total knee replacement, left 8/2/2017   Tobacco abuse   Total knee replacement status 8/26/2011   Revision total knee completed 8-25-11, Dr. Lalo Braden   Venous ulcer of leg (**) 4/26/2013       PAST SURGICAL HISTORY:     Past Surgical History:   Procedure Laterality Date   ABOVE KNEE AMPUTATION Left 10/7/2021   Procedure: AMPUTATION, ABOVE KNEE; Laterality: Left; Surgeon: Lalo Braden MD; Service: Orthopedics   ANTIBIOTIC BEADS INSERTION Left 7/17/2017    Procedure: ANTIBIOTIC SPACER INSERTION; Laterality: Left; Surgeon: Bryon Braden MD; Service: Orthopedics   ANTIBIOTIC BEADS INSERTION Left 11/15/2021   Procedure: INSERTION, ANTIBIOTIC SPACER; Laterality: Left; Surgeon: Bryon Braden MD; Service: Orthopedics   ANTIBIOTIC BEADS REMOVAL 6/30/2011   Procedure:ANTIBIOTIC BEADS REMOVAL; Surgeon:BRYON BRADEN; Location:OR P4; Service:Orthopedics   EXPLANT TOTAL KNEE ARTHROPLASTY 6/9/2011   Procedure:EXPLANT TOTAL KNEE ARTHROPLASTY; Surgeon:BRYON BRADEN; Location:OR G1; Service:Orthopedics   EXPLANT TOTAL KNEE ARTHROPLASTY Left 7/17/2017   Procedure: EXPLANT TOTAL KNEE ARTHROPLASTY; Laterality: Left; Surgeon: Bryon Braden MD; Service: Orthopedics   EXPLANT TOTAL KNEE ARTHROPLASTY Left 10/7/2021   Procedure: EXPLANT TOTAL KNEE ARTHROPLASTY; Laterality: Left; Surgeon: Bryon Braden MD; Service: Orthopedics   FOREIGN BODY REMOVAL, ORTHO 10/25/2012   Procedure: FOREIGN BODY REMOVAL, ORTHO; Surgeon: Bryon Braden MD; Location: OR P4; Service: Orthopedics   GI COLON DIAGNOSTIC 11/23/2010   Procedure:GI COLON DIAGNOSTIC; Surgeon:KELSEY AMARO; Location:GI; Service:Gastroenterology   GI COLON DIAGNOSTIC 8/7/2012   Procedure: GI COLON DIAGNOSTIC; Surgeon: Patrick Hunt DO; Location: GI LAB; Service: Gastroenterology   GI COLON SCREEN NO RISK N/A 3/15/2017   Procedure: GI COLON SCREEN NO RISK; Laterality: N/A; Surgeon: Tigre Gandhi MD; Service: Gastroenterology   GI EGD DIAGNOSTIC 6/7/2012   Procedure: GI EGD DIAGNOSTIC; Surgeon: Nuvia Becerra MD; Location: GI; Service: Gastroenterology   GI EGD WITH BIOPSY 6/7/2012   Procedure: GI EGD WITH BIOPSY; Surgeon: Nuvia Becerra MD; Location: GI; Service: Gastroenterology   GI EGD WITH CONTROL OF BLEED N/A 6/19/2020   Procedure: GI EGD WITH CONTROL OF BLEED; Laterality: N/A; Surgeon: Doris Valle MD; Service: Gastroenterology   GI EGD WITH INJECTION N/A 6/8/2016   Procedure: GI  EGD WITH INJECTION; Laterality: N/A; Surgeon: Yfn Bah MD; Service: Gastroenterology   GI ENTEROSCOPY BIOPSY N/A 6/8/2016   Procedure: GI ENTEROSCOPY BIOPSY; Laterality: N/A; Surgeon: Yfn Bah MD; Service: Gastroenterology   IRRIGATION & DEBRIDEMENT KNEE 6/9/2011   Procedure:I&D KNEE; Surgeon:BRYON BRADEN; Location:OR G1; Service:Orthopedics   IRRIGATION & DEBRIDEMENT KNEE 6/30/2011   Procedure:I&D KNEE; Surgeon:BRYON BRADEN; Location:OR P4; Service:Orthopedics   IRRIGATION & DEBRIDEMENT/POLY LINER EXCHANGE (TKA) 4/4/2011   Procedure:I&D KNEE/POLY EXCHANGE (TKA); Surgeon:BRYON BRADEN; Location:OR P4; Service:Orthopedics   PC LAPAROSCOPIC REPAIR OF GASTRIC FISTULA, INJ   reports bleeding ulcers with perforation and repair x 5   RI ARTHRP KNE CONDYLE&PLATU MEDIAL&LAT COMPARTMENTS 3/3/2011   Knee Replacement, Total   RI LASER ENUCLEATION PROSTATE W/MORCELLATION   REVISION TOTAL KNEE ARTHROPLASTY 8/25/2011   Procedure:REVISION TOTAL KNEE ARTHROPLASTY; Surgeon:BRYON BRADEN; Location:OR G1; Service:Orthopedics   REVISION TOTAL KNEE ARTHROPLASTY Left 7/20/2017   Procedure: REVISION TOTAL KNEE ARTHROPLASTY; Laterality: Left; Surgeon: Bryon Braden MD; Service: Orthopedics   TOTAL KNEE ARTHROPLASTY 3/3/2011   Procedure:TOTAL KNEE ARTHROPLASTY; Surgeon:BRYON BRADEN; Location:OR G1; Service:Orthopedics   WOUND IRRIGATION AND DEBRIDEMENT Left 11/15/2021   Procedure: IRRIGATION AND DEBRIDEMENT, WOUND; Laterality: Left; Surgeon: Bryon Braden MD; Service: Orthopedics   WOUND IRRIGATION AND DEBRIDEMENT Left 12/6/2021   Procedure: IRRIGATION AND DEBRIDEMENT, WOUND; Laterality: Left; Surgeon: Bryon Braden MD; Service: Orthopedics   WOUND IRRIGATION AND DEBRIDEMENT Left 10/6/2022   Procedure: IRRIGATION AND DEBRIDEMENT, WOUND; Laterality: Left; Surgeon: Bryon Braden MD; Service: Orthopedics       PMH:  Past Medical History:   Diagnosis Date    Altered mental  status, unspecified altered mental status type 7/28/2021    Anemia     Chronic pain     Cocaine abuse (H)     DVT (deep venous thrombosis) (H)     GI bleed     Gout     Hepatitis C     HTN (hypertension)     Infection of total knee replacement  (H24)     MRSA    Pancreatitis     Positive antinuclear antibody 2016    PUD (peptic ulcer disease)     Pulmonary embolism (H)     Status post IVC Filter placement    Venous ulcer (H)     in bilateral lower extremities       Active problem list:  Patient Active Problem List   Diagnosis    History of infection of total joint prosthesis of knee    Alcohol abuse    DASH (acute kidney injury) (H24)    Acute gastrointestinal hemorrhage    Acute deep vein thrombosis (DVT) of tibial vein of right lower extremity (H)    Acute deep vein thrombosis (DVT) of femoral vein of right lower extremity (H)    Aseptic loosening of prosthetic knee, sequela    Benign prostatic hyperplasia    Chronic back pain    Chronic deep vein thrombosis (DVT) (H)    Dysphagia    Encephalomalacia    Gout    Groin mass    H/O Billroth II operation    Prurigo nodularis    Venous insufficiency (chronic) (peripheral)    Uses community resources    Total knee replacement status    Tobacco dependency    Tobacco abuse    SOB (shortness of breath)    Sleep disturbance    Retention of urine    Retained metal fragment    Red blood cell disorder    Peptic ulcer    Positive HECTOR (antinuclear antibody)    Patient nonadherence    Hypotension    Neurodermatitis    Hallux valgus, acquired    Hx of AKA (above knee amputation), left (H)       FH:  Family History   Problem Relation Age of Onset    Colon Cancer Brother        SH:  Social History     Socioeconomic History    Marital status:      Spouse name: Not on file    Number of children: Not on file    Years of education: Not on file    Highest education level: Not on file   Occupational History    Not on file   Tobacco Use    Smoking status: Every Day     Packs/day: .25      Types: Cigarettes    Smokeless tobacco: Never   Substance and Sexual Activity    Alcohol use: Yes    Drug use: Yes    Sexual activity: Not on file   Other Topics Concern    Parent/sibling w/ CABG, MI or angioplasty before 65F 55M? Not Asked   Social History Narrative    Not on file     Social Determinants of Health     Financial Resource Strain: Not on file   Food Insecurity: Not on file   Transportation Needs: Not on file   Physical Activity: Not on file   Stress: Not on file   Social Connections: Not on file   Interpersonal Safety: Not on file   Housing Stability: Not on file       MEDS:  See EMR, reviewed  ALL:  See EMR, reviewedAllergies   Allergen Reactions   Meperidine Unknown and Other (see comments)   Other reaction(s): *Unknown   Morphine Hives and Itching/Pruritus   H[place] and It[place]Pruritus [place] [facility] [place] tolerate [place]ycodone     REVIEW OF SYSTEMS:  CONSTITUTIONAL:NEGATIVE for fever, chills, change in weight  INTEGUMENTARY/SKIN: NEGATIVE for worrisome rashes, moles or lesions  EYES: NEGATIVE for vision changes or irritation  ENT/MOUTH: NEGATIVE for ear, mouth and throat problems  RESP:NEGATIVE for significant cough or SOB  BREAST: NEGATIVE for masses, tenderness or discharge  CV: NEGATIVE for chest pain, palpitations or peripheral edema  GI: NEGATIVE for nausea, abdominal pain, heartburn, or change in bowel habits  :NEGATIVE for frequency, dysuria, or hematuria  :NEGATIVE for frequency, dysuria, or hematuria  NEURO: NEGATIVE for weakness, dizziness or paresthesias  ENDOCRINE: NEGATIVE for temperature intolerance, skin/hair changes  HEME/ALLERGY/IMMUNE: NEGATIVE for bleeding problems  PSYCHIATRIC: NEGATIVE for changes in mood or affect    Objective: He has limited range of motion of the bilateral shoulders to passive range of motion consistent with his severe DJD, with crepitance noted.  Nontender over the AC joint anterior cuff or biceps tendon bilaterally.  No palpable shoulder effusion  bilaterally.  He has 5- out of 5 supraspinatus and 5- out of 5 infraspinatus strength on the right.  5 out of 5 bilateral deltoid and subscapularis strength.  Appropriate conversation and affect.      Assessment: Severe bilateral glenohumeral DJD.  Chronic right-sided rotator cuff tear    Plan: I discussed with the patient his previous x-ray and MRI results.  Indicates that he has been offered shoulder surgery for shoulder replacement in the past but does not want replacement surgery, or any other sort of surgery on his shoulders.  He may have more lasting pain relief with ultrasound-guided glenohumeral injections rather than anatomy based, which he has had previously.  He will be referred to the ultrasound clinic for bilateral ultrasound-guided glenohumeral injections.  He knows if they are helpful they can be repeated up to 3 times yearly, as often as every 4 months.

## 2024-04-03 NOTE — LETTER
4/3/2024      RE: Casey Martinez  [address]  [facility] [address]     Dear Colleague,    Thank you for referring your patient, Casey Martinez, to the [facility] [place]. Please see a copy of my visit note below.    Sports Medicine Clinic Visit    PCP: No Ref-Primary, Physician    Casey Martinez is a 68 year old male who is seen  as self referral presenting with bilateral shoulder pain.     Injury: No recent BECKI     Location of Pain: bilateral shoulder; right shoulder worse than the left   Duration of Pain: 3 year(s)  Rating of Pain: 10/10  Pain is better with: CSI   Pain is worse with: Over head movements  Additional Features: Limited range of motion   Treatment so far consists of: bilateral glenohumeral cortisone injections 11/1/2023- relief for about 2 months   Voltaren gel   Prior History of related problems: No     There were no vitals taken for this visit.      Patient follows with orthopedics at [facility] for chronic bilateral shoulder pain, most recent visit 5 months ago reviewed by me.  Patient has a history of chronic bilateral rotator cuff arthropathy.  Pain with overhead reaching and outstretched arm and lying on his shoulder at night.  No previous surgery to the shoulders.  Images at that time showed severe right-sided glenohumeral DJD consistent with severe rotator cuff arthropathy and left shoulder consistent with moderate glenohumeral DJD.  On 1/3/2024 patient had anatomy guided office-based bilateral glenohumeral cortisone injections.  Patient had had previous office-based anatomy guided bilateral glenohumeral cortisone injections 11/1/2023, and prior to that in 2021.        [place] [facility] [place] showed a complete tear of the supraspinatus and infraspinatus with atrophy of the muscle bulk as well as a complete tear of the intra-articular biceps tendon and advanced glenohumeral DJD.    Patient follows with  pain clinic, [facility].  Maintained on gabapentin, Tylenol, Voltaren.  Most recent clinic note 1/30/2024 reviewed by me.        Imaging studies below reviewed by me  Technique: XR SHOULDER RT 2/3V AP/GRASH/Y*   11/1/2023    Indication: pain      Comparison: Chest CT dated 6/20/2020     Findings: No acute osseous abnormality. No dislocation or subluxation. Moderate to severe degenerative changes of left acromion clavicular and glenohumeral joint. Decreased acromiohumeral interval suspicious for rotator cuff injury. The visualized lung fields are clear.           XR SHOULDER LT 2/3V AP/GRASH/Y*  11/1/2023  Order: [id number]  Impression    Impression: Moderate degenerative changes of left acromion clavicular and glenohumeral joint. Suspected rotator cuff injury.             PMH:  Past Medical History:   Diagnosis Date   Acute pulmonary embolism without acute cor pulmonale (**) 10/17/2020   Alcohol abuse   Aseptic loosening of prosthetic knee, sequela 7/12/2017   BPH (benign prostatic hypertrophy)   Chronic gastric ulcer with bleeding and perforation (**)   Chronic infection of knee joint prosthesis, sequela 10/7/2021   Cocaine abuse (**)   Emphysema of lung (**) 6/21/2020   Essential hypertension 10/12/2021   Family history of colon cancer 3/7/2011   Gastrointestinal hemorrhage, unspecified gastrointestinal hemorrhage type 6/18/2020   Gout   Hepatitis C   Infection of total knee replacement, subsequent encounter 7/17/2017   Marijuana abuse   Osteoarthritis   most pronounced in left knee and left shoulder   Other specified hypotension 6/18/2020   PUD (peptic ulcer disease)   3 surgical interventions, 1991;   Pyogenic arthritis of left knee joint, due to unspecified organism (**) 9/22/2021   Status post revision of total knee replacement, left 8/2/2017   Tobacco abuse   Total knee replacement status 8/26/2011   Revision total knee completed 8-25-11, Dr. Lalo Braden   Venous ulcer of leg (**) 4/26/2013       PAST  SURGICAL HISTORY:     Past Surgical History:   Procedure Laterality Date   ABOVE KNEE AMPUTATION Left 10/7/2021   Procedure: AMPUTATION, ABOVE KNEE; Laterality: Left; Surgeon: Bryon Braden MD; Service: Orthopedics   ANTIBIOTIC BEADS INSERTION Left 7/17/2017   Procedure: ANTIBIOTIC SPACER INSERTION; Laterality: Left; Surgeon: Bryon Braden MD; Service: Orthopedics   ANTIBIOTIC BEADS INSERTION Left 11/15/2021   Procedure: INSERTION, ANTIBIOTIC SPACER; Laterality: Left; Surgeon: Bryon Brdaen MD; Service: Orthopedics   ANTIBIOTIC BEADS REMOVAL 6/30/2011   Procedure:ANTIBIOTIC BEADS REMOVAL; Surgeon:BRYON BRADEN; Location:OR P4; Service:Orthopedics   EXPLANT TOTAL KNEE ARTHROPLASTY 6/9/2011   Procedure:EXPLANT TOTAL KNEE ARTHROPLASTY; Surgeon:BRYON BRADEN; Location:OR G1; Service:Orthopedics   EXPLANT TOTAL KNEE ARTHROPLASTY Left 7/17/2017   Procedure: EXPLANT TOTAL KNEE ARTHROPLASTY; Laterality: Left; Surgeon: Bryon Braden MD; Service: Orthopedics   EXPLANT TOTAL KNEE ARTHROPLASTY Left 10/7/2021   Procedure: EXPLANT TOTAL KNEE ARTHROPLASTY; Laterality: Left; Surgeon: Bryon Braden MD; Service: Orthopedics   FOREIGN BODY REMOVAL, ORTHO 10/25/2012   Procedure: FOREIGN BODY REMOVAL, ORTHO; Surgeon: Bryon Braden MD; Location: OR P4; Service: Orthopedics   GI COLON DIAGNOSTIC 11/23/2010   Procedure:GI COLON DIAGNOSTIC; Surgeon:KELSEY AMARO; Location:GI; Service:Gastroenterology   GI COLON DIAGNOSTIC 8/7/2012   Procedure: GI COLON DIAGNOSTIC; Surgeon: Patrick Hunt DO; Location: GI LAB; Service: Gastroenterology   GI COLON SCREEN NO RISK N/A 3/15/2017   Procedure: GI COLON SCREEN NO RISK; Laterality: N/A; Surgeon: Tigre Gandhi MD; Service: Gastroenterology   GI EGD DIAGNOSTIC 6/7/2012   Procedure: GI EGD DIAGNOSTIC; Surgeon: Nuvia Becerra MD; Location: GI; Service: Gastroenterology   GI EGD WITH BIOPSY 6/7/2012   Procedure: GI EGD WITH BIOPSY; Surgeon: Mariam  Nuvia BLANKENSHIP MD; Location: GI; Service: Gastroenterology   GI EGD WITH CONTROL OF BLEED N/A 6/19/2020   Procedure: GI EGD WITH CONTROL OF BLEED; Laterality: N/A; Surgeon: Doris Valle MD; Service: Gastroenterology   GI EGD WITH INJECTION N/A 6/8/2016   Procedure: GI EGD WITH INJECTION; Laterality: N/A; Surgeon: Yfn Bah MD; Service: Gastroenterology   GI ENTEROSCOPY BIOPSY N/A 6/8/2016   Procedure: GI ENTEROSCOPY BIOPSY; Laterality: N/A; Surgeon: Yfn Bah MD; Service: Gastroenterology   IRRIGATION & DEBRIDEMENT KNEE 6/9/2011   Procedure:I&D KNEE; Surgeon:BRYON BRADEN; Location:OR G1; Service:Orthopedics   IRRIGATION & DEBRIDEMENT KNEE 6/30/2011   Procedure:I&D KNEE; Surgeon:BRYON BRADEN; Location:OR P4; Service:Orthopedics   IRRIGATION & DEBRIDEMENT/POLY LINER EXCHANGE (TKA) 4/4/2011   Procedure:I&D KNEE/POLY EXCHANGE (TKA); Surgeon:BRYON BRADEN; Location:OR P4; Service:Orthopedics   PC LAPAROSCOPIC REPAIR OF GASTRIC FISTULA, INJ   reports bleeding ulcers with perforation and repair x 5   CA ARTHRP KNE CONDYLE&PLATU MEDIAL&LAT COMPARTMENTS 3/3/2011   Knee Replacement, Total   CA LASER ENUCLEATION PROSTATE W/MORCELLATION   REVISION TOTAL KNEE ARTHROPLASTY 8/25/2011   Procedure:REVISION TOTAL KNEE ARTHROPLASTY; Surgeon:BRYON BRADEN; Location:OR G1; Service:Orthopedics   REVISION TOTAL KNEE ARTHROPLASTY Left 7/20/2017   Procedure: REVISION TOTAL KNEE ARTHROPLASTY; Laterality: Left; Surgeon: Bryon Braden MD; Service: Orthopedics   TOTAL KNEE ARTHROPLASTY 3/3/2011   Procedure:TOTAL KNEE ARTHROPLASTY; Surgeon:BRYON BRADEN; Location:OR G1; Service:Orthopedics   WOUND IRRIGATION AND DEBRIDEMENT Left 11/15/2021   Procedure: IRRIGATION AND DEBRIDEMENT, WOUND; Laterality: Left; Surgeon: Bryon Braden MD; Service: Orthopedics   WOUND IRRIGATION AND DEBRIDEMENT Left 12/6/2021   Procedure: IRRIGATION AND DEBRIDEMENT, WOUND; Laterality: Left; Surgeon: Bryon Braden,  MD; Service: Orthopedics   WOUND IRRIGATION AND DEBRIDEMENT Left 10/6/2022   Procedure: IRRIGATION AND DEBRIDEMENT, WOUND; Laterality: Left; Surgeon: Lalo Braden MD; Service: Orthopedics       PMH:  Past Medical History:   Diagnosis Date     Altered mental status, unspecified altered mental status type 7/28/2021     Anemia      Chronic pain      Cocaine abuse (H)      DVT (deep venous thrombosis) (H)      GI bleed      Gout      Hepatitis C      HTN (hypertension)      Infection of total knee replacement  (H24)     MRSA     Pancreatitis      Positive antinuclear antibody 2016     PUD (peptic ulcer disease)      Pulmonary embolism (H)     Status post IVC Filter placement     Venous ulcer (H)     in bilateral lower extremities       Active problem list:  Patient Active Problem List   Diagnosis     History of infection of total joint prosthesis of knee     Alcohol abuse     DASH (acute kidney injury) (H24)     Acute gastrointestinal hemorrhage     Acute deep vein thrombosis (DVT) of tibial vein of right lower extremity (H)     Acute deep vein thrombosis (DVT) of femoral vein of right lower extremity (H)     Aseptic loosening of prosthetic knee, sequela     Benign prostatic hyperplasia     Chronic back pain     Chronic deep vein thrombosis (DVT) (H)     Dysphagia     Encephalomalacia     Gout     Groin mass     H/O Billroth II operation     Prurigo nodularis     Venous insufficiency (chronic) (peripheral)     Uses community resources     Total knee replacement status     Tobacco dependency     Tobacco abuse     SOB (shortness of breath)     Sleep disturbance     Retention of urine     Retained metal fragment     Red blood cell disorder     Peptic ulcer     Positive HECTOR (antinuclear antibody)     Patient nonadherence     Hypotension     Neurodermatitis     Hallux valgus, acquired     Hx of AKA (above knee amputation), left (H)       FH:  Family History   Problem Relation Age of Onset     Colon Cancer Brother         SH:  Social History     Socioeconomic History     Marital status:      Spouse name: Not on file     Number of children: Not on file     Years of education: Not on file     Highest education level: Not on file   Occupational History     Not on file   Tobacco Use     Smoking status: Every Day     Packs/day: .25     Types: Cigarettes     Smokeless tobacco: Never   Substance and Sexual Activity     Alcohol use: Yes     Drug use: Yes     Sexual activity: Not on file   Other Topics Concern     Parent/sibling w/ CABG, MI or angioplasty before 65F 55M? Not Asked   Social History Narrative     Not on file     Social Determinants of Health     Financial Resource Strain: Not on file   Food Insecurity: Not on file   Transportation Needs: Not on file   Physical Activity: Not on file   Stress: Not on file   Social Connections: Not on file   Interpersonal Safety: Not on file   Housing Stability: Not on file       MEDS:  See EMR, reviewed  ALL:  See EMR, reviewedAllergies   Allergen Reactions   Meperidine Unknown and Other (see comments)   Other reaction(s): *Unknown   Morphine Hives and Itching/Pruritus   Hives and Itching/Pruritus per [facility] [place].. does tolerate oxycodone     REVIEW OF SYSTEMS:  CONSTITUTIONAL:NEGATIVE for fever, chills, change in weight  INTEGUMENTARY/SKIN: NEGATIVE for worrisome rashes, moles or lesions  EYES: NEGATIVE for vision changes or irritation  ENT/MOUTH: NEGATIVE for ear, mouth and throat problems  RESP:NEGATIVE for significant cough or SOB  BREAST: NEGATIVE for masses, tenderness or discharge  CV: NEGATIVE for chest pain, palpitations or peripheral edema  GI: NEGATIVE for nausea, abdominal pain, heartburn, or change in bowel habits  :NEGATIVE for frequency, dysuria, or hematuria  :NEGATIVE for frequency, dysuria, or hematuria  NEURO: NEGATIVE for weakness, dizziness or paresthesias  ENDOCRINE: NEGATIVE for temperature intolerance, skin/hair changes  HEME/ALLERGY/IMMUNE: NEGATIVE for  bleeding problems  PSYCHIATRIC: NEGATIVE for changes in mood or affect    Objective: He has limited range of motion of the bilateral shoulders to passive range of motion consistent with his severe DJD, with crepitance noted.  Nontender over the AC joint anterior cuff or biceps tendon bilaterally.  No palpable shoulder effusion bilaterally.  He has 5- out of 5 supraspinatus and 5- out of 5 infraspinatus strength on the right.  5 out of 5 bilateral deltoid and subscapularis strength.  Appropriate conversation and affect.      Assessment: Severe bilateral glenohumeral DJD.  Chronic right-sided rotator cuff tear    Plan: I discussed with the patient his previous x-ray and MRI results.  Indicates that he has been offered shoulder surgery for shoulder replacement in the past but does not want replacement surgery, or any other sort of surgery on his shoulders.  He may have more lasting pain relief with ultrasound-guided glenohumeral injections rather than anatomy based, which he has had previously.  He will be referred to the ultrasound clinic for bilateral ultrasound-guided glenohumeral injections.  He knows if they are helpful they can be repeated up to 3 times yearly, as often as every 4 months.                            Again, thank you for allowing me to participate in the care of your patient.      Sincerely,    Jose David Zaidi MD

## 2024-09-08 ENCOUNTER — APPOINTMENT (OUTPATIENT)
Dept: GENERAL RADIOLOGY | Facility: CLINIC | Age: 69
End: 2024-09-08
Attending: EMERGENCY MEDICINE
Payer: COMMERCIAL

## 2024-09-08 ENCOUNTER — HOSPITAL ENCOUNTER (EMERGENCY)
Facility: CLINIC | Age: 69
Discharge: HOME | End: 2024-09-08
Attending: EMERGENCY MEDICINE | Admitting: EMERGENCY MEDICINE
Payer: COMMERCIAL

## 2024-09-08 VITALS
HEART RATE: 83 BPM | DIASTOLIC BLOOD PRESSURE: 83 MMHG | HEIGHT: 78 IN | OXYGEN SATURATION: 94 % | BODY MASS INDEX: 19.09 KG/M2 | SYSTOLIC BLOOD PRESSURE: 120 MMHG | RESPIRATION RATE: 16 BRPM | TEMPERATURE: 98.1 F | WEIGHT: 165 LBS

## 2024-09-08 DIAGNOSIS — L03.115 CELLULITIS OF RIGHT LOWER LEG: ICD-10-CM

## 2024-09-08 DIAGNOSIS — S46.001A INJURY OF RIGHT ROTATOR CUFF, INITIAL ENCOUNTER: ICD-10-CM

## 2024-09-08 LAB
ANION GAP SERPL CALCULATED.3IONS-SCNC: 11 MMOL/L (ref 7–15)
BASOPHILS # BLD AUTO: 0 10E3/UL (ref 0–0.2)
BASOPHILS NFR BLD AUTO: 0 %
BUN SERPL-MCNC: 10.8 MG/DL (ref 8–23)
CALCIUM SERPL-MCNC: 8.9 MG/DL (ref 8.8–10.4)
CHLORIDE SERPL-SCNC: 110 MMOL/L (ref 98–107)
CREAT SERPL-MCNC: 0.9 MG/DL (ref 0.67–1.17)
CRP SERPL-MCNC: 16.16 MG/L
EGFRCR SERPLBLD CKD-EPI 2021: >90 ML/MIN/1.73M2
EOSINOPHIL # BLD AUTO: 1 10E3/UL (ref 0–0.7)
EOSINOPHIL NFR BLD AUTO: 19 %
ERYTHROCYTE [DISTWIDTH] IN BLOOD BY AUTOMATED COUNT: 15 % (ref 10–15)
ERYTHROCYTE [SEDIMENTATION RATE] IN BLOOD BY WESTERGREN METHOD: 24 MM/HR (ref 0–20)
GLUCOSE SERPL-MCNC: 79 MG/DL (ref 70–99)
HCO3 SERPL-SCNC: 18 MMOL/L (ref 22–29)
HCT VFR BLD AUTO: 36.5 % (ref 40–53)
HGB BLD-MCNC: 13 G/DL (ref 13.3–17.7)
IMM GRANULOCYTES # BLD: 0 10E3/UL
IMM GRANULOCYTES NFR BLD: 0 %
LYMPHOCYTES # BLD AUTO: 0.4 10E3/UL (ref 0.8–5.3)
LYMPHOCYTES NFR BLD AUTO: 8 %
MCH RBC QN AUTO: 31.2 PG (ref 26.5–33)
MCHC RBC AUTO-ENTMCNC: 35.6 G/DL (ref 31.5–36.5)
MCV RBC AUTO: 88 FL (ref 78–100)
MONOCYTES # BLD AUTO: 0.3 10E3/UL (ref 0–1.3)
MONOCYTES NFR BLD AUTO: 5 %
NEUTROPHILS # BLD AUTO: 3.5 10E3/UL (ref 1.6–8.3)
NEUTROPHILS NFR BLD AUTO: 68 %
NRBC # BLD AUTO: 0 10E3/UL
NRBC BLD AUTO-RTO: 0 /100
PLATELET # BLD AUTO: 199 10E3/UL (ref 150–450)
POTASSIUM SERPL-SCNC: 4.2 MMOL/L (ref 3.4–5.3)
RBC # BLD AUTO: 4.17 10E6/UL (ref 4.4–5.9)
SODIUM SERPL-SCNC: 139 MMOL/L (ref 135–145)
WBC # BLD AUTO: 5.2 10E3/UL (ref 4–11)

## 2024-09-08 PROCEDURE — 36415 COLL VENOUS BLD VENIPUNCTURE: CPT | Performed by: EMERGENCY MEDICINE

## 2024-09-08 PROCEDURE — 73630 X-RAY EXAM OF FOOT: CPT | Mod: RT

## 2024-09-08 PROCEDURE — 73030 X-RAY EXAM OF SHOULDER: CPT | Mod: RT

## 2024-09-08 PROCEDURE — 85025 COMPLETE CBC W/AUTO DIFF WBC: CPT | Performed by: EMERGENCY MEDICINE

## 2024-09-08 PROCEDURE — 250N000013 HC RX MED GY IP 250 OP 250 PS 637: Performed by: EMERGENCY MEDICINE

## 2024-09-08 PROCEDURE — 80048 BASIC METABOLIC PNL TOTAL CA: CPT | Performed by: EMERGENCY MEDICINE

## 2024-09-08 PROCEDURE — 99284 EMERGENCY DEPT VISIT MOD MDM: CPT | Performed by: EMERGENCY MEDICINE

## 2024-09-08 PROCEDURE — 73590 X-RAY EXAM OF LOWER LEG: CPT | Mod: RT

## 2024-09-08 PROCEDURE — 86140 C-REACTIVE PROTEIN: CPT | Performed by: EMERGENCY MEDICINE

## 2024-09-08 PROCEDURE — 85652 RBC SED RATE AUTOMATED: CPT | Performed by: EMERGENCY MEDICINE

## 2024-09-08 PROCEDURE — 73552 X-RAY EXAM OF FEMUR 2/>: CPT | Mod: RT

## 2024-09-08 RX ORDER — DOXYCYCLINE 100 MG/1
100 CAPSULE ORAL 2 TIMES DAILY
Qty: 20 CAPSULE | Refills: 0 | Status: SHIPPED | OUTPATIENT
Start: 2024-09-08

## 2024-09-08 RX ORDER — GABAPENTIN 300 MG/1
300 CAPSULE ORAL 3 TIMES DAILY
Qty: 90 CAPSULE | Refills: 0 | Status: SHIPPED | OUTPATIENT
Start: 2024-09-08 | End: 2024-10-08

## 2024-09-08 RX ORDER — OXYCODONE HYDROCHLORIDE 5 MG/1
10 TABLET ORAL ONCE
Status: COMPLETED | OUTPATIENT
Start: 2024-09-08 | End: 2024-09-08

## 2024-09-08 RX ORDER — HYDROXYZINE HYDROCHLORIDE 25 MG/1
25 TABLET, FILM COATED ORAL ONCE
Status: COMPLETED | OUTPATIENT
Start: 2024-09-08 | End: 2024-09-08

## 2024-09-08 RX ORDER — DOXYCYCLINE 100 MG/1
100 CAPSULE ORAL 2 TIMES DAILY
Qty: 20 CAPSULE | Refills: 0 | Status: SHIPPED | OUTPATIENT
Start: 2024-09-08 | End: 2024-09-08

## 2024-09-08 RX ORDER — DOXYCYCLINE 100 MG/1
100 CAPSULE ORAL ONCE
Status: COMPLETED | OUTPATIENT
Start: 2024-09-08 | End: 2024-09-08

## 2024-09-08 RX ADMIN — DOXYCYCLINE HYCLATE 100 MG: 100 CAPSULE ORAL at 14:34

## 2024-09-08 RX ADMIN — HYDROXYZINE HYDROCHLORIDE 25 MG: 25 TABLET, FILM COATED ORAL at 11:17

## 2024-09-08 RX ADMIN — OXYCODONE HYDROCHLORIDE 10 MG: 5 TABLET ORAL at 13:48

## 2024-09-08 ASSESSMENT — COLUMBIA-SUICIDE SEVERITY RATING SCALE - C-SSRS
1. IN THE PAST MONTH, HAVE YOU WISHED YOU WERE DEAD OR WISHED YOU COULD GO TO SLEEP AND NOT WAKE UP?: NO
6. HAVE YOU EVER DONE ANYTHING, STARTED TO DO ANYTHING, OR PREPARED TO DO ANYTHING TO END YOUR LIFE?: NO
2. HAVE YOU ACTUALLY HAD ANY THOUGHTS OF KILLING YOURSELF IN THE PAST MONTH?: NO

## 2024-09-08 ASSESSMENT — ACTIVITIES OF DAILY LIVING (ADL)
ADLS_ACUITY_SCORE: 41
ADLS_ACUITY_SCORE: 36

## 2024-09-08 ASSESSMENT — ENCOUNTER SYMPTOMS: LEG PAIN: 1

## 2024-09-08 NOTE — DISCHARGE INSTRUCTIONS
Take doxycycline 2 times a day for 10 days.  If your right leg swelling and pain does not improve or worsens after you take this medication for 10 days or you start developing a fever, come back to the ED. Follow up with your primary care provider and your sports medicine physician in the next week.

## 2024-09-08 NOTE — ED NOTES
Patient wanting his rx sent to [facility] pharmacy and first dose here since it will be closed when he leaves.
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
